# Patient Record
Sex: FEMALE | Race: WHITE | NOT HISPANIC OR LATINO | Employment: FULL TIME | URBAN - METROPOLITAN AREA
[De-identification: names, ages, dates, MRNs, and addresses within clinical notes are randomized per-mention and may not be internally consistent; named-entity substitution may affect disease eponyms.]

---

## 2017-01-12 DIAGNOSIS — J32.9 SINUSITIS, CHRONIC: Primary | ICD-10-CM

## 2017-01-16 RX ORDER — FLUTICASONE PROPIONATE 50 MCG
SPRAY, SUSPENSION (ML) NASAL
Qty: 16 G | Refills: 3 | Status: SHIPPED | OUTPATIENT
Start: 2017-01-16 | End: 2017-06-01

## 2017-03-27 DIAGNOSIS — F95.2 TOURETTE'S DISORDER: ICD-10-CM

## 2017-03-27 RX ORDER — RISPERIDONE 0.25 MG/1
TABLET ORAL
Qty: 90 TABLET | Refills: 1 | Status: SHIPPED | OUTPATIENT
Start: 2017-03-27 | End: 2017-05-22

## 2017-03-30 DIAGNOSIS — Z00.00 HEALTHCARE MAINTENANCE: ICD-10-CM

## 2017-04-03 RX ORDER — FERROUS SULFATE 325(65) MG
TABLET ORAL
Qty: 100 TABLET | Refills: 0 | Status: SHIPPED | OUTPATIENT
Start: 2017-04-03 | End: 2017-07-10

## 2017-04-20 DIAGNOSIS — E03.9 HYPOTHYROIDISM: ICD-10-CM

## 2017-04-20 NOTE — TELEPHONE ENCOUNTER
levothyroxine     Last Written Prescription Date: 12/22/16  Last Quantity: 30, # refills: 3  Last Office Visit with G, P or [facility] prescribing provider: 11/3/16   Next 5 appointments (look out 90 days)     May 16, 2017  9:00 AM CDT   (Arrive by 8:45 AM)   PHYSICAL with Clarissa Santos MD   [facility] ([facility] )    [address]  [facility] [address]   [phone]                   TSH   Date Value Ref Range Status   04/05/2016 1.09 0.40 - 4.00 mU/L Final

## 2017-04-21 RX ORDER — LEVOTHYROXINE SODIUM 150 UG/1
TABLET ORAL
Qty: 30 TABLET | Refills: 0 | Status: SHIPPED | OUTPATIENT
Start: 2017-04-21 | End: 2017-06-19

## 2017-04-27 DIAGNOSIS — F63.9 IMPULSE CONTROL DISEASE: ICD-10-CM

## 2017-04-27 RX ORDER — GUANFACINE 1 MG/1
TABLET ORAL
Qty: 45 TABLET | Refills: 5 | Status: SHIPPED | OUTPATIENT
Start: 2017-04-27 | End: 2017-10-26

## 2017-04-27 NOTE — TELEPHONE ENCOUNTER
tenex      Last Written Prescription Date: 11/3/16  Last Fill Quantity: 45,  # refills: 5   Last Office Visit with [facility], [facility] or [facility] prescribing provider: 11/3/16                                         Next 5 appointments (look out 90 days)     May 16, 2017  9:00 AM CDT   (Arrive by 8:45 AM)   PHYSICAL with Clarissa Santos MD   [facility] ([facility] )    [address]  [facility] [address]   [phone]

## 2017-05-04 DIAGNOSIS — Z00.00 HEALTH CARE MAINTENANCE: ICD-10-CM

## 2017-05-08 RX ORDER — ASCORBIC ACID 500 MG
TABLET ORAL
Qty: 100 TABLET | Refills: 2 | Status: SHIPPED | OUTPATIENT
Start: 2017-05-08 | End: 2018-06-04

## 2017-05-11 DIAGNOSIS — Z00.00 HEALTHCARE MAINTENANCE: ICD-10-CM

## 2017-05-12 RX ORDER — CHLORAL HYDRATE 500 MG
CAPSULE ORAL
Qty: 100 CAPSULE | Refills: 3 | Status: SHIPPED | OUTPATIENT
Start: 2017-05-12 | End: 2017-06-19

## 2017-05-16 ENCOUNTER — OFFICE VISIT (OUTPATIENT)
Dept: FAMILY MEDICINE | Facility: OTHER | Age: 47
End: 2017-05-16
Attending: FAMILY MEDICINE
Payer: MEDICARE

## 2017-05-16 VITALS
RESPIRATION RATE: 20 BRPM | WEIGHT: 120.4 LBS | BODY MASS INDEX: 27.86 KG/M2 | TEMPERATURE: 97.2 F | SYSTOLIC BLOOD PRESSURE: 120 MMHG | DIASTOLIC BLOOD PRESSURE: 72 MMHG | HEIGHT: 55 IN | HEART RATE: 66 BPM | OXYGEN SATURATION: 99 %

## 2017-05-16 DIAGNOSIS — E03.9 HYPOTHYROIDISM, UNSPECIFIED TYPE: ICD-10-CM

## 2017-05-16 DIAGNOSIS — R56.9 CONVULSIONS, UNSPECIFIED CONVULSION TYPE (H): ICD-10-CM

## 2017-05-16 DIAGNOSIS — E78.5 HYPERLIPIDEMIA, UNSPECIFIED HYPERLIPIDEMIA TYPE: ICD-10-CM

## 2017-05-16 DIAGNOSIS — L30.9 ECZEMA, UNSPECIFIED TYPE: ICD-10-CM

## 2017-05-16 DIAGNOSIS — E03.9 HYPOTHYROIDISM, UNSPECIFIED TYPE: Primary | ICD-10-CM

## 2017-05-16 DIAGNOSIS — Z00.00 ROUTINE GENERAL MEDICAL EXAMINATION AT A HEALTH CARE FACILITY: Primary | ICD-10-CM

## 2017-05-16 DIAGNOSIS — F63.9 IMPULSE CONTROL DISORDER: ICD-10-CM

## 2017-05-16 LAB
ALBUMIN SERPL-MCNC: 3.4 G/DL (ref 3.4–5)
ALP SERPL-CCNC: 88 U/L (ref 40–150)
ALT SERPL W P-5'-P-CCNC: 22 U/L (ref 0–50)
ANION GAP SERPL CALCULATED.3IONS-SCNC: 11 MMOL/L (ref 3–14)
AST SERPL W P-5'-P-CCNC: 21 U/L (ref 0–45)
BILIRUB SERPL-MCNC: 0.4 MG/DL (ref 0.2–1.3)
BUN SERPL-MCNC: 18 MG/DL (ref 7–30)
CALCIUM SERPL-MCNC: 9 MG/DL (ref 8.5–10.1)
CHLORIDE SERPL-SCNC: 106 MMOL/L (ref 94–109)
CHOLEST SERPL-MCNC: 129 MG/DL
CO2 SERPL-SCNC: 25 MMOL/L (ref 20–32)
CREAT SERPL-MCNC: 0.98 MG/DL (ref 0.52–1.04)
ERYTHROCYTE [DISTWIDTH] IN BLOOD BY AUTOMATED COUNT: 13.6 % (ref 10–15)
GFR SERPL CREATININE-BSD FRML MDRD: 62 ML/MIN/1.7M2
GLUCOSE SERPL-MCNC: 86 MG/DL (ref 70–99)
HCT VFR BLD AUTO: 42.5 % (ref 35–47)
HDLC SERPL-MCNC: 52 MG/DL
HGB BLD-MCNC: 14.1 G/DL (ref 11.7–15.7)
LDLC SERPL CALC-MCNC: 37 MG/DL
MCH RBC QN AUTO: 33.9 PG (ref 26.5–33)
MCHC RBC AUTO-ENTMCNC: 33.2 G/DL (ref 31.5–36.5)
MCV RBC AUTO: 102 FL (ref 78–100)
NONHDLC SERPL-MCNC: 77 MG/DL
PLATELET # BLD AUTO: 209 10E9/L (ref 150–450)
POTASSIUM SERPL-SCNC: 4.4 MMOL/L (ref 3.4–5.3)
PROT SERPL-MCNC: 7 G/DL (ref 6.8–8.8)
RBC # BLD AUTO: 4.16 10E12/L (ref 3.8–5.2)
SODIUM SERPL-SCNC: 142 MMOL/L (ref 133–144)
TRIGL SERPL-MCNC: 202 MG/DL
TSH SERPL DL<=0.05 MIU/L-ACNC: 4.6 MU/L (ref 0.4–4)
WBC # BLD AUTO: 6.2 10E9/L (ref 4–11)

## 2017-05-16 PROCEDURE — 80061 LIPID PANEL: CPT | Mod: ZL | Performed by: FAMILY MEDICINE

## 2017-05-16 PROCEDURE — 85027 COMPLETE CBC AUTOMATED: CPT | Mod: ZL | Performed by: FAMILY MEDICINE

## 2017-05-16 PROCEDURE — 80053 COMPREHEN METABOLIC PANEL: CPT | Mod: ZL | Performed by: FAMILY MEDICINE

## 2017-05-16 PROCEDURE — 36415 COLL VENOUS BLD VENIPUNCTURE: CPT | Mod: ZL | Performed by: FAMILY MEDICINE

## 2017-05-16 PROCEDURE — 84443 ASSAY THYROID STIM HORMONE: CPT | Mod: ZL | Performed by: FAMILY MEDICINE

## 2017-05-16 PROCEDURE — 99396 PREV VISIT EST AGE 40-64: CPT | Performed by: FAMILY MEDICINE

## 2017-05-16 RX ORDER — TRIAMCINOLONE ACETONIDE 1 MG/G
CREAM TOPICAL
Qty: 80 G | Refills: 1 | Status: SHIPPED | OUTPATIENT
Start: 2017-05-16 | End: 2018-01-03

## 2017-05-16 ASSESSMENT — PAIN SCALES - GENERAL: PAINLEVEL: NO PAIN (0)

## 2017-05-16 NOTE — PROGRESS NOTES
CC:  Yearly Well-Woman Exam    HPI:  Aaliyah is a 46 year old female who present today for yearly exam.  Her last pap smear was in 2016 under anesthesia.  Tdap was updated in 2013.    She is due for labs today.  This winter her eczema was worse, and they are wondering about possible treatment other than Eucerin.      Patient is also due for medication review for impulse control.  Staff notes no increase in behaviors since the last review.  Patient is tolerating current medication well without side effects.  Guardian has not requested any changes to current regimen.        Past Medical History:   Diagnosis Date     Allergic rhinitis, cause unspecified 11/15/2010     Down's syndrome 06/03/2002     DYSPHAGIA NOS 11/13/2007     Impulse control disorder, unspecified 11/15/2010     Other acne 11/15/2010     Other and unspecified hyperlipidemia 05/09/2002     Other convulsions 05/16/2007     Unspecified acquired hypothyroidism 03/21/2001     Unspecified disturbance of conduct 01/05/2006       Past Surgical History:   Procedure Laterality Date     CHOLECYSTECTOMY  01/2011     ENT SURGERY       EXAM UNDER ANESTHESIA PELVIC N/A 5/4/2016    Procedure: EXAM UNDER ANESTHESIA PELVIC;  Surgeon: Valentin Ferris MD;  Location: [facility]     GENITOURINARY SURGERY      INTERNAL URETHROTOMY     HERNIA REPAIR  march 4 2015    ventral herniorrhaphy with intraperitoneal sepramesh     ileostomy take-down  2011     illeostomy  04/25/2011     ventilation tubes, bilateral         Current Outpatient Prescriptions   Medication     triamcinolone (KENALOG) 0.1 % cream     fish oil-omega-3 fatty acids 1000 MG capsule     ascorbic acid (VITAMIN C) 500 MG tablet     guanFACINE (TENEX) 1 MG tablet     levothyroxine (SYNTHROID/LEVOTHROID) 150 MCG tablet     ferrous sulfate (IRON) 325 (65 FE) MG tablet     risperiDONE (RISPERDAL) 0.25 MG tablet     fluticasone (FLONASE) 50 MCG/ACT spray     INTROVALE 0.15-0.03 MG per tablet     omeprazole (PRILOSEC) 20 MG  capsule     simvastatin (ZOCOR) 10 MG tablet     chlorhexidine (PERIDEX) 0.12 % solution     OYSTER SHELL CALCIUM/VITAMIN D 250-125 MG-UNIT TABS per tablet     loratadine (CLARITIN) 10 MG tablet     ibuprofen (ADVIL,MOTRIN) 400 MG tablet     Skin Protectants, Misc. (EUCERIN) cream     No current facility-administered medications for this visit.        Allergies   Allergen Reactions     Amoxicillin      Cephalexin Monohydrate      Keflex         Family History   Problem Relation Age of Onset     CANCER Mother      cause of death     DIABETES No family hx of        Social History     Social History     Marital status: Single     Spouse name: N/A     Number of children: N/A     Years of education: N/A     Social History Main Topics     Smoking status: Never Smoker     Smokeless tobacco: Never Used      Comment: no passive exposure     Alcohol use No     Drug use: No     Sexual activity: No     Other Topics Concern     Blood Transfusions Yes     Caffeine Concern Yes     1 cup coffee daily     Exercise Yes     walking, dance     Parent/Sibling W/ Cabg, Mi Or Angioplasty Before 65f 55m? No     Social History Narrative         Review Of Systems  Skin: negative  Eyes: negative  Ears/Nose/Throat: negative  Respiratory: No shortness of breath, dyspnea on exertion, cough, or hemoptysis  Cardiovascular: negative for, palpitations, irregular heart beat and chest pain  Gastrointestinal: negative for, nausea, vomiting and abdominal pain  Genitourinary: negative for, dysuria, frequency, urgency and hesitancy  Musculoskeletal: negative for, joint pain, joint swelling and joint stiffness  Neurologic: negative for, numbness or tingling of hands, numbness or tingling of feet and speech problems  Psychiatric: negative for, anxiety, depression and agitation  Hematologic/Lymphatic/Immunologic: negative for  Endocrine: negative    Exam:  /72 (BP Location: Left arm, Cuff Size: Adult Regular)  Pulse 66  Temp 97.2  F (36.2  C)  "(Tympanic)  Resp 20  Ht 4' 7.25\" (1.403 m)  Wt 120 lb 6.4 oz (54.6 kg)  SpO2 99%  BMI 27.73 kg/m2  Constitutional: healthy, alert, no distress and cooperative  Head: Normocephalic. No masses, lesions, tenderness or abnormalities  Neck: Neck supple. No adenopathy.  ENT: ENT exam normal, no neck nodes or sinus tenderness  Cardiovascular: regular, normal S1 and S2, no murmurs appreciated  Respiratory: Lungs clear without wheezes or crackles  Breast: deferred  Gastrointestinal: Abdomen soft, non-tender. BS normal. No masses, organomegaly  /Pelvic Exam:  deferred  Musculoskeletal: extremities normal- no gross deformities noted, gait normal and normal muscle tone  Skin: no suspicious lesions or rashes, mild eczema in antecubital fossa bilaterally  Neurologic: Gait normal. Reflexes normal and symmetric. Sensation grossly WNL.  Psychiatric: mentation appears normal and affect normal/bright      Assessment/Plan  (Z00.00) Routine general medical examination at a health care facility  (primary encounter diagnosis)  Comment:   Plan: Screening labs updated.  Follow-up on annual basis.    (L30.9) Eczema, unspecified type  Comment:   Plan: triamcinolone (KENALOG) 0.1 % cream        Triamcinolone added for as needed.    (R56.9) Convulsions, unspecified convulsion type (H)  Comment:   Plan: Comprehensive metabolic panel        Labs updated.    (E78.5) Hyperlipidemia, unspecified hyperlipidemia type  Comment:   Plan: Comprehensive metabolic panel, CBC with         platelets, Lipid Profile        Labs updated.    (E03.9) Hypothyroidism, unspecified type  Comment:   Plan: TSH        Labs updated.    (F63.9) Impulse control disorder  Comment:   Plan: No medication changes at this time.        Clarissa Anderson MD        "

## 2017-05-16 NOTE — MR AVS SNAPSHOT
"              After Visit Summary   5/16/2017    Aaliyah Dean    MRN: [id number]           Patient Information     Date Of Birth          1970        Visit Information        Provider Department      5/16/2017 9:00 AM Clarissa Santos MD [facility] ([facility]        Today's Diagnoses     Routine general medical examination at a health care facility    -  1    Eczema, unspecified type        Convulsions, unspecified convulsion type (H)        Hyperlipidemia, unspecified hyperlipidemia type        Hypothyroidism, unspecified type        Impulse control disorder           Follow-ups after your visit        Follow-up notes from your care team     Return in about 6 months (around 11/16/2017).      Who to contact     If you have questions or need follow up information about today's clinic visit or your schedule please contact [facility] directly at [phone].  Normal or non-critical lab and imaging results will be communicated to you by MyChart, letter or phone within 4 business days after the clinic has received the results. If you do not hear from us within 7 days, please contact the clinic through MyChart or phone. If you have a critical or abnormal lab result, we will notify you by phone as soon as possible.  Submit refill requests through [employer] or call your pharmacy and they will forward the refill request to us. Please allow 3 business days for your refill to be completed.          Additional Information About Your Visit        MyChart Information     [employer] lets you send messages to your doctor, view your test results, renew your prescriptions, schedule appointments and more. To sign up, go to [email or web address] . Click on \"Log in\" on the left side of the screen, which will take you to the Welcome page. Then click on \"Sign up Now\" on the right side of the page.     You will be asked to enter the access code listed below, as well as some personal information. Please follow " "the directions to create your username and password.     Your access code is: [email or web address]  Expires: 2017  9:57 AM     Your access code will  in 90 days. If you need help or a new code, please call your [facility] or [phone].        Care EveryWhere ID     This is your Care EveryWhere ID. This could be used by other organizations to access your [place] [facility] medical records  [id number]        Your Vitals Were     Pulse Temperature Respirations Height Pulse Oximetry BMI (Body Mass Index)    66 97.2  F (36.2  C) (Tympanic) 20 4' 7.25\" (1.403 m) 99% 27.73 kg/m2       Blood Pressure from Last 3 Encounters:   17 120/72   16 118/76   10/07/16 96/58    Weight from Last 3 Encounters:   17 120 lb 6.4 oz (54.6 kg)   16 120 lb (54.4 kg)   10/07/16 117 lb (53.1 kg)              We Performed the Following     CBC with platelets     Comprehensive metabolic panel     Lipid Profile     TSH          Today's Medication Changes          These changes are accurate as of: 17  9:57 AM.  If you have any questions, ask your nurse or doctor.               Start taking these medicines.        Dose/Directions    triamcinolone 0.1 % cream   Commonly known as:  KENALOG   Used for:  Eczema, unspecified type   Started by:  Clarisas Santos MD        Apply sparingly to affected area twice daily as needed   Quantity:  80 g   Refills:  1            Where to get your medicines      These medications were sent to [facility] - [place], [place] - [address]  [address] [place] [address]     Phone:  [phone]     triamcinolone 0.1 % cream                Primary Care Provider Office Phone # Fax #    Clarissa Santos [phone]       [address] [address]        Thank you!     Thank you for choosing [facility]  for your care. Our goal is always to provide you with excellent care. Hearing back from our patients is one way " we can continue to improve our services. Please take a few minutes to complete the written survey that you may receive in the mail after your visit with us. Thank you!             Your Updated Medication List - Protect others around you: Learn how to safely use, store and throw away your medicines at www.disposemymeds.org.          This list is accurate as of: 5/16/17  9:57 AM.  Always use your most recent med list.                   Brand Name Dispense Instructions for use    ascorbic acid 500 MG tablet    VITAMIN C    100 tablet    TAKE 1 TABLET DAILY BY MOUTH       calcium-vitamin D 250-125 MG-UNIT Tabs per tablet    OSCAL    100 tablet    TAKE 1 TABLET TWICE A DAY BY MOUTH TAKE WITH MEALS       chlorhexidine 0.12 % solution    PERIDEX    437 mL    PLACE 15 MILLILITER BY MUCOUS MEMBRANE ROUTE EVERY DAY IN THE MOUTH(AFTER MEALS), SWISH IN MOUTH FOR 30 SECONDS THEN SPIT OUT       eucerin cream          ferrous sulfate 325 (65 FE) MG tablet    IRON    100 tablet    TAKE 1 TABLET TWICE A DAY BY MOUTH       fish oil-omega-3 fatty acids 1000 MG capsule     100 capsule    TAKE 1 CAPSULE 3 TIMES A DAY BY MOUTH       fluticasone 50 MCG/ACT spray    FLONASE    16 g    INSTIL 2 SPRAYS INTO BOTH NOSTRIL DAILY SHAKE GENTLY       guanFACINE 1 MG tablet    TENEX    45 tablet    TAKE 1/2 TABLET BY MOUTH THREE TIMES DAILY       ibuprofen 400 MG tablet    ADVIL/MOTRIN    120 tablet    Take 1 tablet by mouth every 4-6 hours as needed       INTROVALE 0.15-0.03 MG per tablet   Generic drug:  levonorgestrel-ethinyl estradiol     91 tablet    TAKE 1 TABLET BY MOUTH DAILY.       levothyroxine 150 MCG tablet    SYNTHROID/LEVOTHROID    30 tablet    TAKE 1 TABLET DAILY BY MOUTH       loratadine 10 MG tablet    CLARITIN    30 tablet    Take 1 tablet (10 mg) by mouth daily as needed for allergies       omeprazole 20 MG CR capsule    priLOSEC    30 capsule    TAKE 1 CAPSULE DAILY BY MOUTH BEFORE A MEAL       risperiDONE 0.25 MG tablet     risperDAL    90 tablet    TAKE 1 TABLET 3 TIMES DAILY BY MOUTH       simvastatin 10 MG tablet    ZOCOR    30 tablet    TAKE 1 TABLET DAILY BY MOUTH EVERY EVENING FOR ZOCOR       triamcinolone 0.1 % cream    KENALOG    80 g    Apply sparingly to affected area twice daily as needed

## 2017-05-16 NOTE — NURSING NOTE
"Chief Complaint   Patient presents with     Physical       Initial /72 (BP Location: Left arm, Cuff Size: Adult Regular)  Pulse 66  Temp 97.2  F (36.2  C) (Tympanic)  Resp 20  Ht 4' 7.25\" (1.403 m)  Wt 120 lb 6.4 oz (54.6 kg)  SpO2 99%  BMI 27.73 kg/m2 Estimated body mass index is 27.73 kg/(m^2) as calculated from the following:    Height as of this encounter: 4' 7.25\" (1.403 m).    Weight as of this encounter: 120 lb 6.4 oz (54.6 kg).  Medication Reconciliation: complete   Katelynn Randhawa    "

## 2017-05-22 DIAGNOSIS — F95.2 TOURETTE'S DISORDER: ICD-10-CM

## 2017-05-24 NOTE — TELEPHONE ENCOUNTER
risperiDONE (RISPERDAL) 0.25 MG tablet   Last Written Prescription Date: 03/27/2017  Last Fill Quantity: 90, # refills: 0  Last Office Visit with [facility], [facility] or [facility] prescribing provider: 05/16/2017       BP Readings from Last 3 Encounters:   05/16/17 120/72   11/03/16 118/76   10/07/16 96/58     Pulse Readings from Last 2 Encounters:   05/16/17 66   11/03/16 59     Lab Results   Component Value Date    GLC 86 05/16/2017     Lab Results   Component Value Date    WBC 6.2 05/16/2017     Lab Results   Component Value Date    RBC 4.16 05/16/2017     Lab Results   Component Value Date    HGB 14.1 05/16/2017     Lab Results   Component Value Date    HCT 42.5 05/16/2017     No components found for: MCT  Lab Results   Component Value Date     05/16/2017     Lab Results   Component Value Date    MCH 33.9 05/16/2017     Lab Results   Component Value Date    MCHC 33.2 05/16/2017     Lab Results   Component Value Date    RDW 13.6 05/16/2017     Lab Results   Component Value Date     05/16/2017     Lab Results   Component Value Date    CHOL 129 05/16/2017     Lab Results   Component Value Date    HDL 52 05/16/2017     Lab Results   Component Value Date    LDL 37 05/16/2017     Lab Results   Component Value Date    TRIG 202 05/16/2017     Lab Results   Component Value Date    CHOLHDLRATIO 2.4 10/08/2015

## 2017-05-25 RX ORDER — RISPERIDONE 0.25 MG/1
TABLET ORAL
Qty: 90 TABLET | Refills: 1 | Status: SHIPPED | OUTPATIENT
Start: 2017-05-25 | End: 2017-07-24

## 2017-06-01 DIAGNOSIS — J32.9 SINUSITIS, CHRONIC: ICD-10-CM

## 2017-06-01 RX ORDER — FLUTICASONE PROPIONATE 50 MCG
SPRAY, SUSPENSION (ML) NASAL
Qty: 16 G | Refills: 3 | Status: SHIPPED | OUTPATIENT
Start: 2017-06-01 | End: 2017-09-05

## 2017-06-01 NOTE — TELEPHONE ENCOUNTER
flonase      Last Written Prescription Date: 6/3/13  Last Fill Quantity: 16,  # refills: 3   Last Office Visit with G, [facility] or [facility] prescribing provider: 5/16/17

## 2017-06-12 DIAGNOSIS — Z00.00 HEALTH CARE MAINTENANCE: ICD-10-CM

## 2017-06-13 NOTE — TELEPHONE ENCOUNTER
Oyster shell with vitamin d      Last Written Prescription Date: 06122017  Last Fill Quantity: 100,  # refills: 5   Last Office Visit with G, [facility] or [facility] prescribing provider: [phone]

## 2017-06-15 DIAGNOSIS — E78.5 HYPERLIPIDEMIA LDL GOAL <100: ICD-10-CM

## 2017-06-19 DIAGNOSIS — Z00.00 HEALTHCARE MAINTENANCE: ICD-10-CM

## 2017-06-19 DIAGNOSIS — E03.9 HYPOTHYROIDISM: ICD-10-CM

## 2017-06-19 RX ORDER — SIMVASTATIN 10 MG
TABLET ORAL
Qty: 30 TABLET | Refills: 7 | Status: SHIPPED | OUTPATIENT
Start: 2017-06-19 | End: 2018-02-08

## 2017-06-21 RX ORDER — LEVOTHYROXINE SODIUM 150 UG/1
TABLET ORAL
Qty: 30 TABLET | Refills: 1 | Status: SHIPPED | OUTPATIENT
Start: 2017-06-21 | End: 2017-07-20

## 2017-06-21 RX ORDER — CHLORAL HYDRATE 500 MG
CAPSULE ORAL
Qty: 100 CAPSULE | Refills: 10 | Status: SHIPPED | OUTPATIENT
Start: 2017-06-21 | End: 2018-02-05

## 2017-07-10 DIAGNOSIS — Z00.00 HEALTHCARE MAINTENANCE: ICD-10-CM

## 2017-07-11 RX ORDER — FERROUS SULFATE 325(65) MG
TABLET ORAL
Qty: 100 TABLET | Refills: 1 | Status: SHIPPED | OUTPATIENT
Start: 2017-07-11 | End: 2017-08-28

## 2017-07-11 NOTE — TELEPHONE ENCOUNTER
Iron         Last Written Prescription Date: 4/3/17  Last Fill Quantity: 100,    # refills: 0  Last Office Visit with [facility] or [facility] prescribing provider:  5/16/17      Lab Results   Component Value Date    WBC 6.2 05/16/2017     Lab Results   Component Value Date    RBC 4.16 05/16/2017     Lab Results   Component Value Date    HGB 14.1 05/16/2017     Lab Results   Component Value Date    HCT 42.5 05/16/2017     No components found for: MCT  Lab Results   Component Value Date     05/16/2017     Lab Results   Component Value Date    MCH 33.9 05/16/2017     Lab Results   Component Value Date    MCHC 33.2 05/16/2017     Lab Results   Component Value Date    RDW 13.6 05/16/2017     Lab Results   Component Value Date     05/16/2017     Lab Results   Component Value Date    AST 21 05/16/2017     Lab Results   Component Value Date    ALT 22 05/16/2017     Creatinine   Date Value Ref Range Status   05/16/2017 0.98 0.52 - 1.04 mg/dL Final

## 2017-07-20 DIAGNOSIS — E03.9 HYPOTHYROIDISM: ICD-10-CM

## 2017-07-20 NOTE — TELEPHONE ENCOUNTER
Levothyroxine      Last Written Prescription Date: 6/21/2017  Last Quantity: 30, # refills: 1  Last Office Visit with G, P or [facility] prescribing provider: 5/16/2017        TSH   Date Value Ref Range Status   05/16/2017 4.60 (H) 0.40 - 4.00 mU/L Final

## 2017-07-24 DIAGNOSIS — F95.2 TOURETTE'S DISORDER: ICD-10-CM

## 2017-07-24 RX ORDER — LEVOTHYROXINE SODIUM 150 UG/1
TABLET ORAL
Qty: 30 TABLET | Refills: 8 | Status: SHIPPED | OUTPATIENT
Start: 2017-07-24 | End: 2018-02-12

## 2017-07-26 RX ORDER — RISPERIDONE 0.25 MG/1
TABLET ORAL
Qty: 90 TABLET | Refills: 1 | Status: SHIPPED | OUTPATIENT
Start: 2017-07-26 | End: 2017-08-21

## 2017-08-21 DIAGNOSIS — F95.2 TOURETTE'S DISORDER: ICD-10-CM

## 2017-08-21 NOTE — TELEPHONE ENCOUNTER
Risperdal     Last Written Prescription Date: 08/21/2017  Last Fill Quantity: 90, # refills: 1  Last Office Visit with [facility], [facility] or  [facility] prescribing provider: 05/16/2017       BP Readings from Last 3 Encounters:   05/16/17 120/72   11/03/16 118/76   10/07/16 96/58     Pulse Readings from Last 2 Encounters:   05/16/17 66   11/03/16 59     Lab Results   Component Value Date    GLC 86 05/16/2017     Lab Results   Component Value Date    WBC 6.2 05/16/2017     Lab Results   Component Value Date    RBC 4.16 05/16/2017     Lab Results   Component Value Date    HGB 14.1 05/16/2017     Lab Results   Component Value Date    HCT 42.5 05/16/2017     No components found for: MCT  Lab Results   Component Value Date     05/16/2017     Lab Results   Component Value Date    MCH 33.9 05/16/2017     Lab Results   Component Value Date    MCHC 33.2 05/16/2017     Lab Results   Component Value Date    RDW 13.6 05/16/2017     Lab Results   Component Value Date     05/16/2017     Lab Results   Component Value Date    CHOL 129 05/16/2017     Lab Results   Component Value Date    HDL 52 05/16/2017     Lab Results   Component Value Date    LDL 37 05/16/2017     Lab Results   Component Value Date    TRIG 202 05/16/2017     Lab Results   Component Value Date    CHOLHDLRATIO 2.4 10/08/2015

## 2017-08-22 DIAGNOSIS — E03.9 HYPOTHYROIDISM, UNSPECIFIED TYPE: ICD-10-CM

## 2017-08-22 LAB — TSH SERPL DL<=0.005 MIU/L-ACNC: 3.46 MU/L (ref 0.4–4)

## 2017-08-22 PROCEDURE — 84443 ASSAY THYROID STIM HORMONE: CPT | Mod: ZL | Performed by: FAMILY MEDICINE

## 2017-08-22 PROCEDURE — 36415 COLL VENOUS BLD VENIPUNCTURE: CPT | Mod: ZL | Performed by: FAMILY MEDICINE

## 2017-08-22 RX ORDER — RISPERIDONE 0.25 MG/1
TABLET ORAL
Qty: 90 TABLET | Refills: 2 | Status: SHIPPED | OUTPATIENT
Start: 2017-08-22 | End: 2017-11-20

## 2017-08-28 DIAGNOSIS — Z00.00 HEALTHCARE MAINTENANCE: ICD-10-CM

## 2017-08-28 RX ORDER — FERROUS SULFATE 325(65) MG
TABLET ORAL
Qty: 100 TABLET | Refills: 5 | Status: SHIPPED | OUTPATIENT
Start: 2017-08-28 | End: 2018-03-19

## 2017-09-21 DIAGNOSIS — Q90.9 DOWN'S SYNDROME: ICD-10-CM

## 2017-09-21 RX ORDER — LEVONORGESTREL AND ETHINYL ESTRADIOL 0.15-0.03
KIT ORAL
Qty: 91 TABLET | Refills: 1 | Status: SHIPPED | OUTPATIENT
Start: 2017-09-21 | End: 2018-06-25

## 2017-09-25 DIAGNOSIS — Z00.00 HEALTH CARE MAINTENANCE: ICD-10-CM

## 2017-09-26 RX ORDER — CHLORHEXIDINE GLUCONATE ORAL RINSE 1.2 MG/ML
SOLUTION DENTAL
Qty: 437 ML | Status: SHIPPED | OUTPATIENT
Start: 2017-09-26 | End: 2018-05-14

## 2017-10-19 DIAGNOSIS — R13.10 DYSPHAGIA, UNSPECIFIED TYPE: ICD-10-CM

## 2017-10-26 DIAGNOSIS — F63.9 IMPULSE CONTROL DISEASE: ICD-10-CM

## 2017-10-27 RX ORDER — GUANFACINE 1 MG/1
TABLET ORAL
Qty: 45 TABLET | Refills: 5 | Status: SHIPPED | OUTPATIENT
Start: 2017-10-27 | End: 2018-02-21

## 2017-11-20 DIAGNOSIS — F95.2 TOURETTE'S DISORDER: ICD-10-CM

## 2017-11-20 RX ORDER — RISPERIDONE 0.25 MG/1
TABLET ORAL
Qty: 90 TABLET | Refills: 0 | Status: SHIPPED | OUTPATIENT
Start: 2017-11-20 | End: 2018-01-18

## 2017-12-08 ENCOUNTER — OFFICE VISIT (OUTPATIENT)
Dept: FAMILY MEDICINE | Facility: OTHER | Age: 47
End: 2017-12-08
Attending: FAMILY MEDICINE
Payer: MEDICARE

## 2017-12-08 VITALS
SYSTOLIC BLOOD PRESSURE: 100 MMHG | RESPIRATION RATE: 16 BRPM | TEMPERATURE: 98.4 F | WEIGHT: 123 LBS | BODY MASS INDEX: 28.46 KG/M2 | DIASTOLIC BLOOD PRESSURE: 60 MMHG | HEART RATE: 65 BPM | HEIGHT: 55 IN | OXYGEN SATURATION: 97 %

## 2017-12-08 DIAGNOSIS — F91.9 DISTURBANCE OF CONDUCT: ICD-10-CM

## 2017-12-08 DIAGNOSIS — F63.9 IMPULSE CONTROL DISORDER: Primary | ICD-10-CM

## 2017-12-08 PROCEDURE — 99213 OFFICE O/P EST LOW 20 MIN: CPT

## 2017-12-08 PROCEDURE — 99213 OFFICE O/P EST LOW 20 MIN: CPT | Performed by: FAMILY MEDICINE

## 2017-12-08 NOTE — MR AVS SNAPSHOT
"              After Visit Summary   2017    Aaliyah Dean    MRN: [id number]           Patient Information     Date Of Birth          1970        Visit Information        Provider Department      2017 2:00 PM Clarissa Santos MD [facility]        Today's Diagnoses     Impulse control disorder    -  1    Disturbance of conduct           Follow-ups after your visit        Follow-up notes from your care team     Return in about 6 months (around 2018).      Who to contact     If you have questions or need follow up information about today's clinic visit or your schedule please contact [facility] directly at [phone].  Normal or non-critical lab and imaging results will be communicated to you by MyChart, letter or phone within 4 business days after the clinic has received the results. If you do not hear from us within 7 days, please contact the clinic through MyChart or phone. If you have a critical or abnormal lab result, we will notify you by phone as soon as possible.  Submit refill requests through [employer] or call your pharmacy and they will forward the refill request to us. Please allow 3 business days for your refill to be completed.          Additional Information About Your Visit        MyChart Information     [employer] lets you send messages to your doctor, view your test results, renew your prescriptions, schedule appointments and more. To sign up, go to [email or web address] . Click on \"Log in\" on the left side of the screen, which will take you to the Welcome page. Then click on \"Sign up Now\" on the right side of the page.     You will be asked to enter the access code listed below, as well as some personal information. Please follow the directions to create your username and password.     Your access code is: [email or web address]  Expires: 3/8/2018  5:10 PM     Your access code will  in 90 days. If you need help or a new code, please call your " "[facility] or [phone].        Care EveryWhere ID     This is your Care EveryWhere ID. This could be used by other organizations to access your [place] medical records  [id number]        Your Vitals Were     Pulse Temperature Respirations Height Pulse Oximetry BMI (Body Mass Index)    65 98.4  F (36.9  C) (Tympanic) 16 4' 7.25\" (1.403 m) 97% 28.33 kg/m2       Blood Pressure from Last 3 Encounters:   12/08/17 100/60   05/16/17 120/72   11/03/16 118/76    Weight from Last 3 Encounters:   12/08/17 123 lb (55.8 kg)   05/16/17 120 lb 6.4 oz (54.6 kg)   11/03/16 120 lb (54.4 kg)              Today, you had the following     No orders found for display       Primary Care Provider Office Phone # Fax #    Clarissa Santos [phone] [address] [facility] [address]        Equal Access to Services     [facility]: Hadii lina julian hadasho Soomaali, waaxda luqadaha, qaybta kaalmada adeegyaalexy, helen trevino . So [facility] [phone].    ATENCIÓN: Si habla español, tiene a paulino disposición servicios gratuitos de asistencia lingüística. Llame al [phone].    We comply with applicable federal civil rights laws and [place] laws. We do not discriminate on the basis of race, color, national origin, age, disability, sex, sexual orientation, or gender identity.            Thank you!     Thank you for choosing [facility]  for your care. Our goal is always to provide you with excellent care. Hearing back from our patients is one way we can continue to improve our services. Please take a few minutes to complete the written survey that you may receive in the mail after your visit with us. Thank you!             Your Updated Medication List - Protect others around you: Learn how to safely use, store and throw away your medicines at www.disposemymeds.org.          This list is accurate as of: 12/8/17  5:10 PM.  Always use your most recent med list. "                   Brand Name Dispense Instructions for use Diagnosis    ascorbic acid 500 MG tablet    VITAMIN C    100 tablet    TAKE 1 TABLET DAILY BY MOUTH    Health care maintenance       Calcium-Vitamin D3 250-125 MG-UNIT Tabs     100 tablet    TAKE 1 TABLET TWICE A DAY BY MOUTH TAKE WITH MEALS    Health care maintenance       chlorhexidine 0.12 % solution    PERIDEX    437 mL    PLACE 15 MILLILITER BY MUCOUS MEMBRANE ROUTE EVERY DAY IN THE MOUTH(AFTER MEALS), SWISH IN MOUTH FOR 30 SECONDS THEN SPIT OUT    Health care maintenance       eucerin cream           ferrous sulfate 325 (65 FE) MG tablet    IRON    100 tablet    TAKE 1 TABLET TWICE A DAY BY MOUTH    Healthcare maintenance       fish oil-omega-3 fatty acids 1000 MG capsule     100 capsule    TAKE 1 CAPSULE 3 TIMES A DAY BY MOUTH    Healthcare maintenance       fluticasone 50 MCG/ACT spray    FLONASE    16 g    INSTIL 2 SPRAYS INTO BOTH NOSTRIL DAILY SHAKE GENTLY    Sinusitis, chronic       guanFACINE 1 MG tablet    TENEX    45 tablet    TAKE 1/2 TABLET BY MOUTH THREE TIMES DAILY    Impulse control disease       ibuprofen 400 MG tablet    ADVIL/MOTRIN    120 tablet    Take 1 tablet by mouth every 4-6 hours as needed    Health care maintenance       INTROVALE 0.15-0.03 MG per tablet   Generic drug:  levonorgestrel-ethinyl estradiol     91 tablet    TAKE 1 TABLET BY MOUTH DAILY.    Down's syndrome       levothyroxine 150 MCG tablet    SYNTHROID/LEVOTHROID    30 tablet    TAKE 1 TABLET DAILY BY MOUTH    Hypothyroidism       loratadine 10 MG tablet    CLARITIN    30 tablet    Take 1 tablet (10 mg) by mouth daily as needed for allergies    Dysfunction of Eustachian tube, bilateral       omeprazole 20 MG CR capsule    priLOSEC    30 capsule    TAKE 1 CAPSULE DAILY BY MOUTH BEFORE A MEAL    Dysphagia, unspecified type       risperiDONE 0.25 MG tablet    risperDAL    90 tablet    TAKE 1 TABLET 3 TIMES DAILY BY MOUTH    Tourette's disorder       simvastatin 10 MG  tablet    ZOCOR    30 tablet    TAKE 1 TABLET DAILY BY MOUTH EVERY EVENING FOR ZOCOR    Hyperlipidemia LDL goal <100       triamcinolone 0.1 % cream    KENALOG    80 g    Apply sparingly to affected area twice daily as needed    Eczema, unspecified type

## 2017-12-08 NOTE — PROGRESS NOTES
SUBJECTIVE:   Aaliyah Dean is a 47 year old female who presents to clinic today for the following health issues:    Medication Followup of Impulse Control Disorder and Conduct Disorder    Taking Medication as prescribed: yes    Side Effects:  None    Medication Helping Symptoms:  Yes    Staff and guardian think medication is doing well, no medication changes are requested.         Problem list and histories reviewed & adjusted, as indicated.  Additional history: as documented    Patient Active Problem List   Diagnosis     Down's syndrome     Hypothyroidism     Hyperlipidemia     Disturbance of conduct     Convulsions (H)     Dysphagia     Allergic rhinitis     Other acne     Impulse control disorder     Past Surgical History:   Procedure Laterality Date     CHOLECYSTECTOMY  01/2011     ENT SURGERY       EXAM UNDER ANESTHESIA PELVIC N/A 5/4/2016    Procedure: EXAM UNDER ANESTHESIA PELVIC;  Surgeon: Valentin Ferris MD;  Location: [facility]     GENITOURINARY SURGERY      INTERNAL URETHROTOMY     HERNIA REPAIR  march 4 2015    ventral herniorrhaphy with intraperitoneal sepramesh     ileostomy take-down  2011     illeostomy  04/25/2011     ventilation tubes, bilateral         Social History   Substance Use Topics     Smoking status: Never Smoker     Smokeless tobacco: Never Used      Comment: no passive exposure     Alcohol use No     Family History   Problem Relation Age of Onset     CANCER Mother      cause of death     DIABETES No family hx of          Current Outpatient Prescriptions   Medication Sig Dispense Refill     risperiDONE (RISPERDAL) 0.25 MG tablet TAKE 1 TABLET 3 TIMES DAILY BY MOUTH 90 tablet 0     guanFACINE (TENEX) 1 MG tablet TAKE 1/2 TABLET BY MOUTH THREE TIMES DAILY 45 tablet 5     omeprazole (PRILOSEC) 20 MG CR capsule TAKE 1 CAPSULE DAILY BY MOUTH BEFORE A MEAL 30 capsule 5     chlorhexidine (PERIDEX) 0.12 % solution PLACE 15 MILLILITER BY MUCOUS MEMBRANE ROUTE EVERY DAY IN THE MOUTH(AFTER  "MEALS), SWISH IN MOUTH FOR 30 SECONDS THEN SPIT  mL PRN     INTROVALE 0.15-0.03 MG per tablet TAKE 1 TABLET BY MOUTH DAILY. 91 tablet 1     fluticasone (FLONASE) 50 MCG/ACT spray INSTIL 2 SPRAYS INTO BOTH NOSTRIL DAILY SHAKE GENTLY 16 g 3     levothyroxine (SYNTHROID/LEVOTHROID) 150 MCG tablet TAKE 1 TABLET DAILY BY MOUTH 30 tablet 8     simvastatin (ZOCOR) 10 MG tablet TAKE 1 TABLET DAILY BY MOUTH EVERY EVENING FOR ZOCOR 30 tablet 7     Calcium Carb-Cholecalciferol (CALCIUM-VITAMIN D3) 250-125 MG-UNIT TABS TAKE 1 TABLET TWICE A DAY BY MOUTH TAKE WITH MEALS 100 tablet 5     triamcinolone (KENALOG) 0.1 % cream Apply sparingly to affected area twice daily as needed 80 g 1     ascorbic acid (VITAMIN C) 500 MG tablet TAKE 1 TABLET DAILY BY MOUTH 100 tablet 2     loratadine (CLARITIN) 10 MG tablet Take 1 tablet (10 mg) by mouth daily as needed for allergies 30 tablet 5     ibuprofen (ADVIL,MOTRIN) 400 MG tablet Take 1 tablet by mouth every 4-6 hours as needed 120 tablet 2     Skin Protectants, Misc. (EUCERIN) cream        ferrous sulfate (IRON) 325 (65 FE) MG tablet TAKE 1 TABLET TWICE A DAY BY MOUTH 100 tablet 5     fish oil-omega-3 fatty acids 1000 MG capsule TAKE 1 CAPSULE 3 TIMES A DAY BY MOUTH 100 capsule 10     Allergies   Allergen Reactions     Amoxicillin      Cephalexin Monohydrate      Keflex           Reviewed and updated as needed this visit by clinical staffTobacco  Allergies  Meds  Problems  Med Hx  Surg Hx  Fam Hx  Soc Hx        Reviewed and updated as needed this visit by Provider         ROS:  Constitutional, HEENT, cardiovascular, pulmonary, gi and gu systems are negative, except as otherwise noted.      OBJECTIVE:   /60 (BP Location: Right arm, Patient Position: Sitting, Cuff Size: Adult Regular)  Pulse 65  Temp 98.4  F (36.9  C) (Tympanic)  Resp 16  Ht 4' 7.25\" (1.403 m)  Wt 123 lb (55.8 kg)  SpO2 97%  BMI 28.33 kg/m2  Body mass index is 28.33 kg/(m^2).  GENERAL: healthy, " alert and no distress  EYES: Eyes grossly normal to inspection, PERRL and conjunctivae and sclerae normal  HENT: normal cephalic/atraumatic, ear canals and TM's normal, nose and mouth without ulcers or lesions, oropharynx clear and oral mucous membranes moist  NECK: no adenopathy  RESP: lungs clear to auscultation - no rales, rhonchi or wheezes  CV: regular rate and rhythm, normal S1 S2, no S3 or S4, no murmur, click or rub, no peripheral edema and peripheral pulses strong  PSYCH: mentation appears normal, affect normal/bright    Diagnostic Test Results:  none     ASSESSMENT/PLAN:     1. Impulse control disorder  No medication changes recommended, follow-up in 6 months, sooner as needed.    2. Disturbance of conduct  As above.        Clarissa Santos MD  [facility]

## 2017-12-08 NOTE — NURSING NOTE
"Chief Complaint   Patient presents with     Recheck Medication     [facility] Resident  Reviewing Risperdal dose, doing well on current dose       Initial /60 (BP Location: Right arm, Patient Position: Sitting, Cuff Size: Adult Regular)  Pulse 65  Temp 98.4  F (36.9  C) (Tympanic)  Resp 16  Ht 4' 7.25\" (1.403 m)  Wt 123 lb (55.8 kg)  SpO2 97%  BMI 28.33 kg/m2 Estimated body mass index is 28.33 kg/(m^2) as calculated from the following:    Height as of this encounter: 4' 7.25\" (1.403 m).    Weight as of this encounter: 123 lb (55.8 kg).  Medication Reconciliation: complete     Karli Emerson      "

## 2018-01-03 DIAGNOSIS — L30.9 ECZEMA, UNSPECIFIED TYPE: ICD-10-CM

## 2018-01-04 DIAGNOSIS — J32.9 CHRONIC SINUSITIS, UNSPECIFIED LOCATION: ICD-10-CM

## 2018-01-04 RX ORDER — FLUTICASONE PROPIONATE 50 MCG
SPRAY, SUSPENSION (ML) NASAL
Qty: 16 G | Refills: 3 | Status: SHIPPED | OUTPATIENT
Start: 2018-01-04 | End: 2018-05-29

## 2018-01-05 RX ORDER — TRIAMCINOLONE ACETONIDE 1 MG/G
CREAM TOPICAL
Qty: 80 G | Refills: 1 | Status: SHIPPED | OUTPATIENT
Start: 2018-01-05

## 2018-01-18 DIAGNOSIS — F95.2 TOURETTE'S DISORDER: ICD-10-CM

## 2018-01-18 RX ORDER — RISPERIDONE 0.25 MG/1
TABLET ORAL
Qty: 90 TABLET | Refills: 3 | Status: SHIPPED | OUTPATIENT
Start: 2018-01-18 | End: 2018-06-18

## 2018-01-18 NOTE — TELEPHONE ENCOUNTER
risperidone      Last Written Prescription Date:  11/2/17  Last Fill Quantity: 90,   # refills: 0  Last Office Visit: 12/8/17  Future Office visit: none

## 2018-02-05 DIAGNOSIS — Z00.00 HEALTHCARE MAINTENANCE: ICD-10-CM

## 2018-02-05 RX ORDER — CHLORAL HYDRATE 500 MG
CAPSULE ORAL
Qty: 100 CAPSULE | Refills: 2 | Status: SHIPPED | OUTPATIENT
Start: 2018-02-05 | End: 2018-02-05

## 2018-02-05 RX ORDER — OMEGA-3 FATTY ACIDS/FISH OIL 300-1000MG
CAPSULE ORAL
Qty: 100 CAPSULE | Refills: 2 | Status: SHIPPED | OUTPATIENT
Start: 2018-02-05 | End: 2018-05-14

## 2018-02-05 NOTE — TELEPHONE ENCOUNTER
Refill request for Fish oil Capsule, Omega 3 fatty acids 1000mg  Last refill 1-3-18 , qty # pharmacy requested 100  Last office visit 12-8-17

## 2018-02-08 DIAGNOSIS — E78.5 HYPERLIPIDEMIA LDL GOAL <100: ICD-10-CM

## 2018-02-08 RX ORDER — SIMVASTATIN 10 MG
TABLET ORAL
Qty: 30 TABLET | Refills: 2 | Status: SHIPPED | OUTPATIENT
Start: 2018-02-08 | End: 2018-05-02

## 2018-02-15 DIAGNOSIS — R13.10 DYSPHAGIA, UNSPECIFIED TYPE: ICD-10-CM

## 2018-02-16 DIAGNOSIS — Z00.00 HEALTH CARE MAINTENANCE: ICD-10-CM

## 2018-02-16 NOTE — TELEPHONE ENCOUNTER
Calcium Carb-Cholecalciferol (CALCIUM-VITAMIN D3) 250-125 MG-UNIT TABS   Last Written Prescription Date:  6/13/17  Last Fill Quantity: 100,   # refills: 5  Last Office Visit: 12/8/17  Future Office visit:

## 2018-02-21 DIAGNOSIS — F63.9 IMPULSE CONTROL DISEASE: ICD-10-CM

## 2018-02-21 NOTE — TELEPHONE ENCOUNTER
Refill request for Guanfacine HCI (Tenex) 1 mg tab  Last refill 1-22-18 , qty #45  Last office visit 12-8-17

## 2018-02-22 RX ORDER — GUANFACINE 1 MG/1
TABLET ORAL
Qty: 45 TABLET | Refills: 5 | Status: SHIPPED | OUTPATIENT
Start: 2018-02-22 | End: 2018-07-30

## 2018-03-19 DIAGNOSIS — Z00.00 HEALTHCARE MAINTENANCE: ICD-10-CM

## 2018-03-21 RX ORDER — FERROUS SULFATE 325(65) MG
TABLET ORAL
Qty: 100 TABLET | Refills: 2 | Status: SHIPPED | OUTPATIENT
Start: 2018-03-21 | End: 2018-11-20

## 2018-04-30 DIAGNOSIS — J32.9 CHRONIC SINUSITIS, UNSPECIFIED LOCATION: ICD-10-CM

## 2018-04-30 RX ORDER — FLUTICASONE PROPIONATE 50 MCG
SPRAY, SUSPENSION (ML) NASAL
Qty: 16 G | Refills: 2 | OUTPATIENT
Start: 2018-04-30

## 2018-05-02 DIAGNOSIS — E03.9 HYPOTHYROIDISM: ICD-10-CM

## 2018-05-02 DIAGNOSIS — E78.5 HYPERLIPIDEMIA LDL GOAL <100: ICD-10-CM

## 2018-05-04 RX ORDER — LEVOTHYROXINE SODIUM 150 UG/1
TABLET ORAL
Qty: 30 TABLET | Refills: 0 | Status: SHIPPED | OUTPATIENT
Start: 2018-05-04 | End: 2018-06-11

## 2018-05-04 RX ORDER — SIMVASTATIN 10 MG
TABLET ORAL
Qty: 30 TABLET | Refills: 0 | Status: SHIPPED | OUTPATIENT
Start: 2018-05-04 | End: 2018-06-18

## 2018-05-04 NOTE — TELEPHONE ENCOUNTER
zocor      Last Written Prescription Date:  2/8/18  Last Fill Quantity: 30,   # refills: 2  Last Office Visit: 12/8/17  Future Office visit:    Next 5 appointments (look out 90 days)     May 18, 2018  9:00 AM CDT   (Arrive by 8:45 AM)   PHYSICAL with Clarissa Santos MD   [facility] ([facility] )    [address]  [place] [address]   [phone]                   levothyroxine      Last Written Prescription Date:  2/12/18  Last Fill Quantity: 30,   # refills: 2  Last Office Visit: 12/8/17  Future Office visit:    Next 5 appointments (look out 90 days)     May 18, 2018  9:00 AM CDT   (Arrive by 8:45 AM)   PHYSICAL with Clarissa Santos MD   [facility] ([facility] )    [address]  [place] [address]   [phone]

## 2018-05-18 ENCOUNTER — OFFICE VISIT (OUTPATIENT)
Dept: FAMILY MEDICINE | Facility: OTHER | Age: 48
End: 2018-05-18
Attending: FAMILY MEDICINE
Payer: MEDICARE

## 2018-05-18 VITALS
DIASTOLIC BLOOD PRESSURE: 58 MMHG | SYSTOLIC BLOOD PRESSURE: 106 MMHG | HEART RATE: 68 BPM | WEIGHT: 116 LBS | RESPIRATION RATE: 18 BRPM | OXYGEN SATURATION: 97 % | BODY MASS INDEX: 26.85 KG/M2 | HEIGHT: 55 IN | TEMPERATURE: 96.9 F

## 2018-05-18 DIAGNOSIS — E03.9 HYPOTHYROIDISM, UNSPECIFIED TYPE: ICD-10-CM

## 2018-05-18 DIAGNOSIS — E78.5 HYPERLIPIDEMIA, UNSPECIFIED HYPERLIPIDEMIA TYPE: ICD-10-CM

## 2018-05-18 DIAGNOSIS — R56.9 CONVULSIONS, UNSPECIFIED CONVULSION TYPE (H): ICD-10-CM

## 2018-05-18 DIAGNOSIS — L98.9 SKIN LESION: ICD-10-CM

## 2018-05-18 DIAGNOSIS — Z00.00 ROUTINE GENERAL MEDICAL EXAMINATION AT A HEALTH CARE FACILITY: Primary | ICD-10-CM

## 2018-05-18 LAB
ALBUMIN SERPL-MCNC: 3.2 G/DL (ref 3.4–5)
ALP SERPL-CCNC: 92 U/L (ref 40–150)
ALT SERPL W P-5'-P-CCNC: 34 U/L (ref 0–50)
ANION GAP SERPL CALCULATED.3IONS-SCNC: 9 MMOL/L (ref 3–14)
AST SERPL W P-5'-P-CCNC: 23 U/L (ref 0–45)
BILIRUB SERPL-MCNC: 0.4 MG/DL (ref 0.2–1.3)
BUN SERPL-MCNC: 16 MG/DL (ref 7–30)
CALCIUM SERPL-MCNC: 9.3 MG/DL (ref 8.5–10.1)
CHLORIDE SERPL-SCNC: 104 MMOL/L (ref 94–109)
CHOLEST SERPL-MCNC: 123 MG/DL
CO2 SERPL-SCNC: 26 MMOL/L (ref 20–32)
CREAT SERPL-MCNC: 0.92 MG/DL (ref 0.52–1.04)
ERYTHROCYTE [DISTWIDTH] IN BLOOD BY AUTOMATED COUNT: 12.9 % (ref 10–15)
GFR SERPL CREATININE-BSD FRML MDRD: 66 ML/MIN/1.7M2
GLUCOSE SERPL-MCNC: 85 MG/DL (ref 70–99)
HCT VFR BLD AUTO: 43 % (ref 35–47)
HDLC SERPL-MCNC: 43 MG/DL
HGB BLD-MCNC: 14.1 G/DL (ref 11.7–15.7)
LDLC SERPL CALC-MCNC: 43 MG/DL
MCH RBC QN AUTO: 33.7 PG (ref 26.5–33)
MCHC RBC AUTO-ENTMCNC: 32.8 G/DL (ref 31.5–36.5)
MCV RBC AUTO: 103 FL (ref 78–100)
NONHDLC SERPL-MCNC: 80 MG/DL
PLATELET # BLD AUTO: 204 10E9/L (ref 150–450)
POTASSIUM SERPL-SCNC: 4.6 MMOL/L (ref 3.4–5.3)
PROT SERPL-MCNC: 7 G/DL (ref 6.8–8.8)
RBC # BLD AUTO: 4.18 10E12/L (ref 3.8–5.2)
SODIUM SERPL-SCNC: 139 MMOL/L (ref 133–144)
TRIGL SERPL-MCNC: 186 MG/DL
TSH SERPL DL<=0.005 MIU/L-ACNC: 0.14 MU/L (ref 0.4–4)
WBC # BLD AUTO: 6 10E9/L (ref 4–11)

## 2018-05-18 PROCEDURE — 80061 LIPID PANEL: CPT | Mod: ZL | Performed by: FAMILY MEDICINE

## 2018-05-18 PROCEDURE — 85027 COMPLETE CBC AUTOMATED: CPT | Mod: ZL | Performed by: FAMILY MEDICINE

## 2018-05-18 PROCEDURE — 99396 PREV VISIT EST AGE 40-64: CPT | Performed by: FAMILY MEDICINE

## 2018-05-18 PROCEDURE — 80053 COMPREHEN METABOLIC PANEL: CPT | Mod: ZL | Performed by: FAMILY MEDICINE

## 2018-05-18 PROCEDURE — 36415 COLL VENOUS BLD VENIPUNCTURE: CPT | Mod: ZL | Performed by: FAMILY MEDICINE

## 2018-05-18 PROCEDURE — 84443 ASSAY THYROID STIM HORMONE: CPT | Mod: ZL | Performed by: FAMILY MEDICINE

## 2018-05-18 ASSESSMENT — PAIN SCALES - GENERAL: PAINLEVEL: NO PAIN (0)

## 2018-05-18 NOTE — LETTER
May 18, 2018      Aaliyah Dean  [address]   [facility] [id number]        Dear Aaliyah,     Should recheck TSH in 3 months.  Other labs normal/negative.    Results for orders placed or performed in visit on 05/18/18   TSH   Result Value Ref [facility]    TSH 0.14 (L) 0.40 - 4.00 mU/L   Lipid Profile   Result Value Ref [facility]    Cholesterol 123 <200 mg/dL    Triglycerides 186 (H) <150 mg/dL    HDL Cholesterol 43 (L) >49 mg/dL    LDL Cholesterol Calculated 43 <100 mg/dL    Non HDL Cholesterol 80 <130 mg/dL   Comprehensive metabolic panel   Result Value Ref [facility]    Sodium 139 133 - 144 mmol/L    Potassium 4.6 3.4 - 5.3 mmol/L    Chloride 104 94 - 109 mmol/L    Carbon Dioxide 26 20 - 32 mmol/L    Anion Gap 9 3 - 14 mmol/L    Glucose 85 70 - 99 mg/dL    Urea Nitrogen 16 7 - 30 mg/dL    Creatinine 0.92 0.52 - 1.04 mg/dL    GFR Estimate 66 >60 mL/min/1.7m2    GFR Estimate If Black 80 >60 mL/min/1.7m2    Calcium 9.3 8.5 - 10.1 mg/dL    Bilirubin Total 0.4 0.2 - 1.3 mg/dL    Albumin 3.2 (L) 3.4 - 5.0 g/dL    Protein Total 7.0 6.8 - 8.8 g/dL    Alkaline Phosphatase 92 40 - 150 U/L    ALT 34 0 - 50 U/L    AST 23 0 - 45 U/L   CBC with platelets   Result Value Ref [facility]    WBC 6.0 4.0 - 11.0 10e9/L    RBC Count 4.18 3.8 - 5.2 10e12/L    Hemoglobin 14.1 11.7 - 15.7 g/dL    Hematocrit 43.0 35.0 - 47.0 %     (H) 78 - 100 fl    MCH 33.7 (H) 26.5 - 33.0 pg    MCHC 32.8 31.5 - 36.5 g/dL    RDW 12.9 10.0 - 15.0 %    Platelet Count 204 150 - 450 10e9/L           Sincerely,        Clarissa Santos MD

## 2018-05-18 NOTE — PROGRESS NOTES
SUBJECTIVE:   CC: Aaliyah Dean is an 47 year old woman who presents for preventive health visit.     Healthy Habits:    Do you get at least three servings of calcium containing foods daily (dairy, green leafy vegetables, etc.)? yes    Amount of exercise or daily activities, outside of work: 0.5 hour(s) per day and olympic training twice a week    Problems taking medications regularly No    Medication side effects: No    Have you had an eye exam in the past two years? unknown    Do you see a dentist twice per year? yes    Do you have sleep apnea, excessive snoring or daytime drowsiness?no      Hyperlipidemia Follow-Up      Rate your low fat/cholesterol diet?: good    Taking statin?  Yes, no muscle aches from statin    Other lipid medications/supplements?:  none    Hypothyroidism Follow-up      Since last visit, patient describes the following symptoms: Weight stable, no hair loss, no skin changes, no constipation, no loose stools      Today's PHQ-2 Score: No flowsheet data found.    Abuse: Current or Past(Physical, Sexual or Emotional)- No  Do you feel safe in your environment - Yes    Social History   Substance Use Topics     Smoking status: Never Smoker     Smokeless tobacco: Never Used      Comment: no passive exposure     Alcohol use No     If you drink alcohol do you typically have >3 drinks per day or >7 drinks per week? No                     Reviewed orders with patient.  Reviewed health maintenance and updated orders accordingly - Yes  Patient Active Problem List   Diagnosis     Down's syndrome     Hypothyroidism     Hyperlipidemia     Disturbance of conduct     Convulsions (H)     Dysphagia     Allergic rhinitis     Other acne     Impulse control disorder     Past Surgical History:   Procedure Laterality Date     CHOLECYSTECTOMY  01/2011     ENT SURGERY       EXAM UNDER ANESTHESIA PELVIC N/A 5/4/2016    Procedure: EXAM UNDER ANESTHESIA PELVIC;  Surgeon: Valentin Ferris MD;  Location: [facility]      GENITOURINARY SURGERY      INTERNAL URETHROTOMY     HERNIA REPAIR  march 4 2015    ventral herniorrhaphy with intraperitoneal sepramesh     ileostomy take-down  2011     illeostomy  04/25/2011     ventilation tubes, bilateral         Social History   Substance Use Topics     Smoking status: Never Smoker     Smokeless tobacco: Never Used      Comment: no passive exposure     Alcohol use No     Family History   Problem Relation Age of Onset     CANCER Mother      cause of death     DIABETES No family hx of          Current Outpatient Prescriptions   Medication Sig Dispense Refill     ascorbic acid (VITAMIN C) 500 MG tablet TAKE 1 TABLET DAILY BY MOUTH 100 tablet 2     Calcium Carb-Cholecalciferol (CALCIUM-VITAMIN D3) 250-125 MG-UNIT TABS Take 1 tablet by mouth 2 times daily Take with meals 100 tablet 5     chlorhexidine (PERIDEX) 0.12 % solution SWAB ON TEETH AND GUMS FOR 30 SECONDS TWICE A  mL 0     ferrous sulfate (IRON) 325 (65 FE) MG tablet Take one (1) tablet BY MOUTH twice daily 100 tablet 2     fish oil-omega-3 fatty acids 1000 MG capsule TAKE ONE (1) CAPSULE BY MOUTH THREE TIMES DAILY 100 capsule 5     fluticasone (FLONASE) 50 MCG/ACT spray INSTIL 2 SPRAYS INTO BOTH NOSTRIL DAILY SHAKE GENTLY 16 g 3     guanFACINE (TENEX) 1 MG tablet Take 1/2 tablet BY MOUTH three times daily 45 tablet 5     ibuprofen (ADVIL,MOTRIN) 400 MG tablet Take 1 tablet by mouth every 4-6 hours as needed 120 tablet 2     INTROVALE 0.15-0.03 MG per tablet TAKE 1 TABLET BY MOUTH DAILY. 91 tablet 1     levothyroxine (SYNTHROID/LEVOTHROID) 150 MCG tablet TAKE ONE (1) TABLET BY MOUTH DAILY 30 tablet 0     loratadine (CLARITIN) 10 MG tablet Take 1 tablet (10 mg) by mouth daily as needed for allergies 30 tablet 5     omeprazole (PRILOSEC) 20 MG CR capsule Take one (1) capsule BY MOUTH daily 90 capsule 2     risperiDONE (RISPERDAL) 0.25 MG tablet Take one (1) tablet BY MOUTH three times daily 90 tablet 3     simvastatin (ZOCOR) 10 MG  "tablet TAKE ONE (1) TABLET BY MOUTH DAILY 30 tablet 0     Skin Protectants, Misc. (EUCERIN) cream        triamcinolone (KENALOG) 0.1 % cream Apply sparingly to affected area twice daily as needed 80 g 1     Allergies   Allergen Reactions     Amoxicillin      Cephalexin Monohydrate      Keflex         Patient under age 50, mutual decision reflected in health maintenance.      Pertinent mammograms are reviewed under the imaging tab.  History of abnormal Pap smear: NO - age 30- 65 PAP every 3 years recommended    Reviewed and updated as needed this visit by clinical staff  Tobacco  Allergies  Meds  Problems  Med Hx  Surg Hx  Fam Hx  Soc Hx          Reviewed and updated as needed this visit by Provider            ROS:  CONSTITUTIONAL: NEGATIVE for fever, chills, change in weight  INTEGUMENTARU/SKIN: NEGATIVE for worrisome rashes, moles or lesions  EYES: NEGATIVE for vision changes or irritation  ENT: NEGATIVE for ear, mouth and throat problems  RESP: NEGATIVE for significant cough or SOB  BREAST: NEGATIVE for masses, tenderness or discharge  CV: NEGATIVE for chest pain, palpitations or peripheral edema  GI: NEGATIVE for nausea, abdominal pain, heartburn, or change in bowel habits  : NEGATIVE for unusual urinary or vaginal symptoms. Periods are regular.  MUSCULOSKELETAL: NEGATIVE for significant arthralgias or myalgia  NEURO: NEGATIVE for weakness, dizziness or paresthesias  PSYCHIATRIC: NEGATIVE for changes in mood or affect    OBJECTIVE:   /58 (BP Location: Right arm, Patient Position: Sitting, Cuff Size: Adult Regular)  Pulse 68  Temp 96.9  F (36.1  C) (Tympanic)  Resp 18  Ht 4' 7.25\" (1.403 m)  Wt 116 lb (52.6 kg)  SpO2 97%  BMI 26.72 kg/m2  EXAM:  GENERAL: healthy, alert and no distress  EYES: Eyes grossly normal to inspection, PERRL and conjunctivae and sclerae normal  HENT: ear canals and TM's normal, nose and mouth without ulcers or lesions  NECK: no adenopathy  RESP: lungs clear to " "auscultation - no rales, rhonchi or wheezes  CV: regular rate and rhythm, normal S1 S2, no S3 or S4, no murmur, click or rub, no peripheral edema and peripheral pulses strong  ABDOMEN: soft, nontender, no hepatosplenomegaly, no masses and bowel sounds normal  MS: no gross musculoskeletal defects noted, no edema  SKIN: crusted skin lesion on left facial cheek  NEURO: Normal strength and tone, mentation intact and speech normal  PSYCH: mentation appears normal, affect normal/bright    ASSESSMENT/PLAN:       ICD-10-CM    1. Routine general medical examination at a health care facility Z00.00    2. Hyperlipidemia, unspecified hyperlipidemia type E78.5 Lipid Profile   3. Hypothyroidism, unspecified type E03.9 TSH   4. Convulsions, unspecified convulsion type (H) R56.9 Comprehensive metabolic panel     CBC with platelets   5. Skin lesion L98.9 GENERAL SURG ADULT REFERRAL       COUNSELING:   Reviewed preventive health counseling, as reflected in patient instructions         reports that she has never smoked. She has never used smokeless tobacco.    Estimated body mass index is 26.72 kg/(m^2) as calculated from the following:    Height as of this encounter: 4' 7.25\" (1.403 m).    Weight as of this encounter: 116 lb (52.6 kg).       Counseling Resources:  ATP IV Guidelines  Pooled Cohorts Equation Calculator  Breast Cancer Risk Calculator  FRAX Risk Assessment  ICSI Preventive Guidelines  Dietary Guidelines for Americans, 2010  USDA's MyPlate  ASA Prophylaxis  Lung CA Screening      Clarissa Santos MD  [facility] [place]  "

## 2018-05-18 NOTE — MR AVS SNAPSHOT
After Visit Summary   5/18/2018    Aaliyah Dean    MRN: [id number]           Patient Information     Date Of Birth          1970        Visit Information        Provider Department      5/18/2018 9:00 AM Clarissa Santos MD [facility]        Today's Diagnoses     Routine general medical examination at a health care facility    -  1    Hyperlipidemia, unspecified hyperlipidemia type        Hypothyroidism, unspecified type        Convulsions, unspecified convulsion type (H)        Skin lesion          Care Instructions      Preventive Health Recommendations  Female Ages 40 to 49    Yearly exam:     See your health care provider every year in order to  1. Review health changes.   2. Discuss preventive care.    3. Review your medicines if your doctor prescribed any.      Get a Pap test every three years (unless you have an abnormal result and your provider advises testing more often).      If you get Pap tests with HPV test, you only need to test every 5 years, unless you have an abnormal result. You do not need a Pap test if your uterus was removed (hysterectomy) and you have not had cancer.      You should be tested each year for STDs (sexually transmitted diseases), if you're at risk.       Ask your doctor if you should have a mammogram.      Have a colonoscopy (test for colon cancer) if someone in your family has had colon cancer or polyps before age 50.       Have a cholesterol test every 5 years.       Have a diabetes test (fasting glucose) after age 45. If you are at risk for diabetes, you should have this test every 3 years.    Shots: Get a flu shot each year. Get a tetanus shot every 10 years.     Nutrition:     Eat at least 5 servings of fruits and vegetables each day.    Eat whole-grain bread, whole-wheat pasta and brown rice instead of white grains and rice.    Talk to your provider about Calcium and Vitamin D.     Lifestyle    Exercise at least 150 minutes a week  (an average of 30 minutes a day, 5 days a week). This will help you control your weight and prevent disease.    Limit alcohol to one drink per day.    No smoking.     Wear sunscreen to prevent skin cancer.    See your dentist every six months for an exam and cleaning.          Follow-ups after your visit        Additional Services     GENERAL SURG ADULT REFERRAL       Your provider has referred you to: General Surgery - excision of skin lesion on facial cheek - will need sedation    Please be aware that coverage of these services is subject to the terms and limitations of your health insurance plan.  Call member services at your health plan with any benefit or coverage questions.      Please bring the following with you to your appointment:    (1) Any X-Rays, CTs or MRIs which have been performed.  Contact the facility where they were done to arrange for  prior to your scheduled appointment.   (2) List of current medications   (3) This referral request   (4) Any documents/labs given to you for this referral                  Follow-up notes from your care team     Return in about 6 months (around 11/18/2018).      Who to contact     If you have questions or need follow up information about today's clinic visit or your schedule please contact [facility] (formerly [facility]) directly at [phone].  Normal or non-critical lab and imaging results will be communicated to you by MyChart, letter or phone within 4 business days after the clinic has received the results. If you do not hear from us within 7 days, please contact the clinic through MyChart or phone. If you have a critical or abnormal lab result, we will notify you by phone as soon as possible.  Submit refill requests through [employer] or call your pharmacy and they will forward the refill request to us. Please allow 3 business days for your refill to be completed.          Additional Information About Your Visit        [employer] Information     [employer]ite lets you  "send messages to your doctor, view your test results, renew your prescriptions, schedule appointments and more. To sign up, go to [email or web address] . Click on \"Log in\" on the left side of the screen, which will take you to the Welcome page. Then click on \"Sign up Now\" on the right side of the page.     You will be asked to enter the access code listed below, as well as some personal information. Please follow the directions to create your username and password.     Your access code is: [email or web address]  Expires: 2018  9:18 AM     Your access code will  in 90 days. If you need help or a new code, please call your [place] clinic or [phone].        Care EveryWhere ID     This is your Care EveryWhere ID. This could be used by other organizations to access your [place] medical records  [id number]        Your Vitals Were     Pulse Temperature Respirations Height Pulse Oximetry BMI (Body Mass Index)    68 96.9  F (36.1  C) (Tympanic) 18 4' 7.25\" (1.403 m) 97% 26.72 kg/m2       Blood Pressure from Last 3 Encounters:   18 106/58   17 100/60   17 120/72    Weight from Last 3 Encounters:   18 116 lb (52.6 kg)   17 123 lb (55.8 kg)   17 120 lb 6.4 oz (54.6 kg)              We Performed the Following     CBC with platelets     Comprehensive metabolic panel     GENERAL SURG ADULT REFERRAL     Lipid Profile     TSH        Primary Care Provider Office Phone # Fax #    Clarissa Santos [phone] [address] [facility] [address]        Equal Access to Services     ENDER AMAYA AH: Nurys Martinez, promise lauren, helen tairq. So [facility] [phone].    ATENCIÓN: Si habla español, tiene a paulino disposición servicios gratuitos de asistencia lingüística. Llame al [phone].    We comply with applicable federal civil rights laws and [place] laws. We do not discriminate " on the basis of race, color, national origin, age, disability, sex, sexual orientation, or gender identity.            Thank you!     Thank you for choosing [facility]  for your care. Our goal is always to provide you with excellent care. Hearing back from our patients is one way we can continue to improve our services. Please take a few minutes to complete the written survey that you may receive in the mail after your visit with us. Thank you!             Your Updated Medication List - Protect others around you: Learn how to safely use, store and throw away your medicines at www.disposemymeds.org.          This list is accurate as of 5/18/18  9:18 AM.  Always use your most recent med list.                   Brand Name Dispense Instructions for use Diagnosis    ascorbic acid 500 MG tablet    VITAMIN C    100 tablet    TAKE 1 TABLET DAILY BY MOUTH    Health care maintenance       Calcium-Vitamin D3 250-125 MG-UNIT Tabs     100 tablet    Take 1 tablet by mouth 2 times daily Take with meals    Health care maintenance       chlorhexidine 0.12 % solution    PERIDEX    473 mL    SWAB ON TEETH AND GUMS FOR 30 SECONDS TWICE A DAY    Health care maintenance       eucerin cream           ferrous sulfate 325 (65 Fe) MG tablet    IRON    100 tablet    Take one (1) tablet BY MOUTH twice daily    Healthcare maintenance       fish oil-omega-3 fatty acids 1000 MG capsule     100 capsule    TAKE ONE (1) CAPSULE BY MOUTH THREE TIMES DAILY    Healthcare maintenance       fluticasone 50 MCG/ACT spray    FLONASE    16 g    INSTIL 2 SPRAYS INTO BOTH NOSTRIL DAILY SHAKE GENTLY    Chronic sinusitis, unspecified location       guanFACINE 1 MG tablet    TENEX    45 tablet    Take 1/2 tablet BY MOUTH three times daily    Impulse control disease       ibuprofen 400 MG tablet    ADVIL/MOTRIN    120 tablet    Take 1 tablet by mouth every 4-6 hours as needed    Health care maintenance       INTROVALE 0.15-0.03 MG per tablet    Generic drug:  levonorgestrel-ethinyl estradiol     91 tablet    TAKE 1 TABLET BY MOUTH DAILY.    Down's syndrome       levothyroxine 150 MCG tablet    SYNTHROID/LEVOTHROID    30 tablet    TAKE ONE (1) TABLET BY MOUTH DAILY    Hypothyroidism       loratadine 10 MG tablet    CLARITIN    30 tablet    Take 1 tablet (10 mg) by mouth daily as needed for allergies    Dysfunction of Eustachian tube, bilateral       omeprazole 20 MG CR capsule    priLOSEC    90 capsule    Take one (1) capsule BY MOUTH daily    Dysphagia, unspecified type       risperiDONE 0.25 MG tablet    risperDAL    90 tablet    Take one (1) tablet BY MOUTH three times daily    Tourette's disorder       simvastatin 10 MG tablet    ZOCOR    30 tablet    TAKE ONE (1) TABLET BY MOUTH DAILY    Hyperlipidemia LDL goal <100       triamcinolone 0.1 % cream    KENALOG    80 g    Apply sparingly to affected area twice daily as needed    Eczema, unspecified type

## 2018-05-18 NOTE — NURSING NOTE
"Chief Complaint   Patient presents with     Physical     [facility] Resident       Initial /58 (BP Location: Right arm, Patient Position: Sitting, Cuff Size: Adult Regular)  Pulse 68  Temp 96.9  F (36.1  C) (Tympanic)  Resp 18  Ht 4' 7.25\" (1.403 m)  Wt 116 lb (52.6 kg)  SpO2 97%  BMI 26.72 kg/m2 Estimated body mass index is 26.72 kg/(m^2) as calculated from the following:    Height as of this encounter: 4' 7.25\" (1.403 m).    Weight as of this encounter: 116 lb (52.6 kg).  Medication Reconciliation: complete    Karli Emerson LPN    "

## 2018-05-29 DIAGNOSIS — J32.9 CHRONIC SINUSITIS, UNSPECIFIED LOCATION: ICD-10-CM

## 2018-05-30 RX ORDER — FLUTICASONE PROPIONATE 50 MCG
SPRAY, SUSPENSION (ML) NASAL
Qty: 16 G | Refills: 10 | Status: SHIPPED | OUTPATIENT
Start: 2018-05-30 | End: 2019-06-10

## 2018-05-30 NOTE — TELEPHONE ENCOUNTER
Flonase  Last Written Prescription Date: 1/4/18  Last Fill Quantity: 16g # of Refills: 3  Last Office Visit: 5/18/18

## 2018-06-04 DIAGNOSIS — Z00.00 HEALTH CARE MAINTENANCE: ICD-10-CM

## 2018-06-05 RX ORDER — ASCORBIC ACID 500 MG
TABLET ORAL
Qty: 100 TABLET | Refills: 2 | Status: SHIPPED | OUTPATIENT
Start: 2018-06-05 | End: 2018-12-17

## 2018-06-05 NOTE — TELEPHONE ENCOUNTER
Vitamin C  Last Written Prescription Date: 5/8/17  Last Fill Quantity: 100 # of Refills: 2  Last Office Visit: 5/18/18

## 2018-06-08 ENCOUNTER — TELEPHONE (OUTPATIENT)
Dept: FAMILY MEDICINE | Facility: OTHER | Age: 48
End: 2018-06-08

## 2018-06-08 NOTE — TELEPHONE ENCOUNTER
Guardian Franci called for patient.She will be faxing guardianship papers.Patient has appointment on Monday with you.Dx down syndrome,impule control disorder.Facitliy staff will be bringing patient.She has concerns for patient.Increase aggression in self,others.Signs of anxiety and depression.Crying outbursts where she states she feels sad and then cannot explain.Hits self and others.This has increaed in the last year.She is on Risperdal.Patient sensitive to medications and prefer not to add more.Does not know if pain related. She did have a twisted intestine,and ten hernia surgery.She is wondering if hormone levels could be checked d/t her age and possibly menopause.Thank you.

## 2018-06-08 NOTE — TELEPHONE ENCOUNTER
9:20 AM    Reason for Call: Phone Call    Description: pt's legal guardian called in stating she has some concerns about an upcoming appt; and would like to email these concerns in possible.  Her email is [email or web address].  She would not state what the concerns were, she preferred to discuss them directly with the nurse.      Was an appointment offered for this call? No  If yes : Appointment type              Date    Preferred method for responding to this message: email  What is your phone number ?    If we cannot reach you directly, may we leave a detailed response at the number you provided? You can call her if necessary at [phone], but she prefers email.     Can this message wait until your PCP/provider returns, if available today? Not applicable, provider is in today.      Dionne Tavarez

## 2018-06-08 NOTE — TELEPHONE ENCOUNTER
Patient has appointment on 5.11.18.Called back and update on voicemail to use [employer] to communicate or call back with concerns.

## 2018-06-11 ENCOUNTER — OFFICE VISIT (OUTPATIENT)
Dept: FAMILY MEDICINE | Facility: OTHER | Age: 48
End: 2018-06-11
Attending: NURSE PRACTITIONER
Payer: MEDICARE

## 2018-06-11 VITALS
OXYGEN SATURATION: 97 % | WEIGHT: 116.8 LBS | HEART RATE: 87 BPM | DIASTOLIC BLOOD PRESSURE: 62 MMHG | RESPIRATION RATE: 16 BRPM | SYSTOLIC BLOOD PRESSURE: 100 MMHG | BODY MASS INDEX: 27.03 KG/M2 | TEMPERATURE: 97.4 F | HEIGHT: 55 IN

## 2018-06-11 DIAGNOSIS — Z79.899 OTHER LONG TERM (CURRENT) DRUG THERAPY: ICD-10-CM

## 2018-06-11 DIAGNOSIS — F95.2 TOURETTE'S DISORDER: ICD-10-CM

## 2018-06-11 DIAGNOSIS — E03.9 HYPOTHYROIDISM: ICD-10-CM

## 2018-06-11 DIAGNOSIS — F43.21 GRIEF REACTION: Primary | ICD-10-CM

## 2018-06-11 LAB — FSH SERPL-ACNC: <0.2 IU/L

## 2018-06-11 PROCEDURE — 82306 VITAMIN D 25 HYDROXY: CPT | Mod: ZL | Performed by: NURSE PRACTITIONER

## 2018-06-11 PROCEDURE — 36415 COLL VENOUS BLD VENIPUNCTURE: CPT | Mod: ZL | Performed by: NURSE PRACTITIONER

## 2018-06-11 PROCEDURE — G0463 HOSPITAL OUTPT CLINIC VISIT: HCPCS

## 2018-06-11 PROCEDURE — 99214 OFFICE O/P EST MOD 30 MIN: CPT | Performed by: NURSE PRACTITIONER

## 2018-06-11 PROCEDURE — 83001 ASSAY OF GONADOTROPIN (FSH): CPT | Mod: ZL | Performed by: NURSE PRACTITIONER

## 2018-06-11 RX ORDER — CITALOPRAM HYDROBROMIDE 10 MG/1
10 TABLET ORAL DAILY
Qty: 30 TABLET | Refills: 0 | Status: SHIPPED | OUTPATIENT
Start: 2018-06-11 | End: 2018-07-09

## 2018-06-11 ASSESSMENT — PAIN SCALES - GENERAL: PAINLEVEL: MILD PAIN (2)

## 2018-06-11 NOTE — PROGRESS NOTES
SUBJECTIVE:   Aaliyah Dean is a 47 year old female who presents to clinic today for the following health issues:  Concerns for weight loss and feeling sad    Concern - weight loss feeling sad  Onset: since bowel surgery here and there feeling sad a couple weeks now    Description:   Does not want to eat fruits and vegetables     Intensity: mild    Progression of Symptoms:  same    Accompanying Signs & Symptoms:  Feeling sad does not know why     Previous history of similar problem:   no    Precipitating factors:   Worsened by: Possibly from recent death in the family was notified on mothers day     Alleviating factors:  Improved by: inknown    Therapies Tried and outcome: none    She is here today with staff from [facility].  Staff note that her sisters were to be here today as well but are not.  Staff note a death in the family and since she has been more sad and acting out.  Staff also note that if she does act out, she is easily redirected.  As for decreased food intake, staff note she eats most of her meals, but every now and then she will not eat her corn or other veggie.  Staff note this change occurred when she started helping to dish up meals.       Problem list and histories reviewed & adjusted, as indicated.  Additional history: as documented    Patient Active Problem List   Diagnosis     Down's syndrome     Hypothyroidism     Hyperlipidemia     Disturbance of conduct     Convulsions (H)     Dysphagia     Allergic rhinitis     Other acne     Impulse control disorder     Past Surgical History:   Procedure Laterality Date     CHOLECYSTECTOMY  01/2011     ENT SURGERY       EXAM UNDER ANESTHESIA PELVIC N/A 5/4/2016    Procedure: EXAM UNDER ANESTHESIA PELVIC;  Surgeon: Valentin Ferris MD;  Location: [facility]     GENITOURINARY SURGERY      INTERNAL URETHROTOMY     HERNIA REPAIR  march 4 2015    ventral herniorrhaphy with intraperitoneal sepramesh     ileostomy take-down  2011     illeostomy  04/25/2011      ventilation tubes, bilateral         Social History   Substance Use Topics     Smoking status: Never Smoker     Smokeless tobacco: Never Used      Comment: no passive exposure     Alcohol use No     Family History   Problem Relation Age of Onset     CANCER Mother      cause of death     DIABETES No family hx of          Current Outpatient Prescriptions   Medication Sig Dispense Refill     ascorbic acid (VITAMIN C) 500 MG tablet TAKE ONE (1) TABLET BY MOUTH DAILY 100 tablet 2     Calcium Carb-Cholecalciferol (CALCIUM-VITAMIN D3) 250-125 MG-UNIT TABS Take 1 tablet by mouth 2 times daily Take with meals 100 tablet 5     chlorhexidine (PERIDEX) 0.12 % solution SWAB ON TEETH AND GUMS FOR 30 SECONDS TWICE A  mL 0     ferrous sulfate (IRON) 325 (65 FE) MG tablet Take one (1) tablet BY MOUTH twice daily 100 tablet 2     fish oil-omega-3 fatty acids 1000 MG capsule TAKE ONE (1) CAPSULE BY MOUTH THREE TIMES DAILY 100 capsule 5     fluticasone (FLONASE) 50 MCG/ACT spray INSTILL 2 SPRAYS INTO BOTH NOSTRILS DAILY SHAKE GENTLY 16 g 10     guanFACINE (TENEX) 1 MG tablet Take 1/2 tablet BY MOUTH three times daily 45 tablet 5     ibuprofen (ADVIL,MOTRIN) 400 MG tablet Take 1 tablet by mouth every 4-6 hours as needed 120 tablet 2     INTROVALE 0.15-0.03 MG per tablet TAKE 1 TABLET BY MOUTH DAILY. 91 tablet 1     levothyroxine (SYNTHROID/LEVOTHROID) 150 MCG tablet TAKE ONE (1) TABLET BY MOUTH DAILY 30 tablet 0     loratadine (CLARITIN) 10 MG tablet Take 1 tablet (10 mg) by mouth daily as needed for allergies 30 tablet 5     omeprazole (PRILOSEC) 20 MG CR capsule Take one (1) capsule BY MOUTH daily 90 capsule 2     risperiDONE (RISPERDAL) 0.25 MG tablet Take one (1) tablet BY MOUTH three times daily 90 tablet 3     simvastatin (ZOCOR) 10 MG tablet TAKE ONE (1) TABLET BY MOUTH DAILY 30 tablet 0     Skin Protectants, Misc. (EUCERIN) cream        triamcinolone (KENALOG) 0.1 % cream Apply sparingly to affected area twice daily as  "needed 80 g 1     Allergies   Allergen Reactions     Amoxicillin      Cephalexin Monohydrate      Keflex       Recent Labs   Lab Test  05/18/18   0915  08/22/17   0850  05/16/17   0924  10/07/16   1551  04/05/16   0942   LDL  43   --   37   --   64   HDL  43*   --   52   --   45*   TRIG  186*   --   202*   --   226*   ALT  34   --   22  23  40   CR  0.92   --   0.98  1.03  1.00   GFRESTIMATED  66   --   62  58*  60*   GFRESTBLACK  80   --   75  70  73   POTASSIUM  4.6   --   4.4  4.7  4.5   TSH  0.14*  3.46  4.60*   --   1.09      BP Readings from Last 3 Encounters:   06/11/18 100/62   05/18/18 106/58   12/08/17 100/60    Wt Readings from Last 3 Encounters:   06/11/18 116 lb 12.8 oz (53 kg)   05/18/18 116 lb (52.6 kg)   12/08/17 123 lb (55.8 kg)                    Reviewed and updated as needed this visit by clinical staff  Tobacco  Allergies       Reviewed and updated as needed this visit by Provider         ROS:  Constitutional, HEENT, cardiovascular, pulmonary, gi and gu systems are negative, except as otherwise noted.    OBJECTIVE:     /62 (BP Location: Left arm, Patient Position: Chair, Cuff Size: Adult Regular)  Pulse 87  Temp 97.4  F (36.3  C) (Tympanic)  Resp 16  Ht 4' 7.25\" (1.403 m)  Wt 116 lb 12.8 oz (53 kg)  SpO2 97%  BMI 26.9 kg/m2  Body mass index is 26.9 kg/(m^2).  GENERAL: well groomed, cooperative and no distress  RESP: lungs clear to auscultation - no rales, rhonchi or wheezes  CV: regular rate and rhythm, normal S1 S2, no S3 or S4,   MS: no gross musculoskeletal defects noted, no edema  PSYCH: mentation stable, smiling      ASSESSMENT/PLAN:       1. Tourette's disorder  Continue current plan    2. Grief reaction  - start citalopram (CELEXA) 10 MG tablet; Take 1 tablet (10 mg) by mouth daily  Dispense: 30 tablet; Refill: 0  - Follicle stimulating hormone  - Vitamin D Deficiency    3. Other long term (current) drug therapy   - Vitamin D level today    FUTURE APPOINTMENTS:       - " Follow-up visit in 2-3 weeks    Zulma Chaudhry NP  [facility]

## 2018-06-11 NOTE — PATIENT INSTRUCTIONS
ASSESSMENT/PLAN:       1. Tourette's disorder  Continue current plan    2. Grief reaction  - start citalopram (CELEXA) 10 MG tablet; Take 1 tablet (10 mg) by mouth daily  Dispense: 30 tablet; Refill: 0  - Follicle stimulating hormone  - Vitamin D Deficiency    3. Other long term (current) drug therapy   - Vitamin D Deficiency    FUTURE APPOINTMENTS:       - Follow-up visit in 2-3 weeks    Zulma Chaudhry NP  [facility]

## 2018-06-11 NOTE — TELEPHONE ENCOUNTER
Synthroid      Last Written Prescription Date:  5/4/2018  Last Fill Quantity: 30,   # refills: 0  Last Office Visit: 6/11/2018  Future Office visit:    Next 5 appointments (look out 90 days)     Jun 26, 2018  9:45 AM CDT   (Arrive by 9:30 AM)   SHORT with Zulma Chaudhry NP   [facility] ([facility] )    [address]  [facility] [address]   [phone]                   Routing refill request to provider for review/approval

## 2018-06-11 NOTE — NURSING NOTE
"Chief Complaint   Patient presents with     Weight Loss       Initial /62 (BP Location: Left arm, Patient Position: Chair, Cuff Size: Adult Regular)  Pulse 87  Temp 97.4  F (36.3  C) (Tympanic)  Resp 16  Ht 4' 7.25\" (1.403 m)  Wt 116 lb 12.8 oz (53 kg)  SpO2 97%  BMI 26.9 kg/m2 Estimated body mass index is 26.9 kg/(m^2) as calculated from the following:    Height as of this encounter: 4' 7.25\" (1.403 m).    Weight as of this encounter: 116 lb 12.8 oz (53 kg).  Medication Reconciliation: complete    Pamela M. Lechevalier, LPN    "

## 2018-06-11 NOTE — MR AVS SNAPSHOT
After Visit Summary   6/11/2018    Aaliyah Dean    MRN: [id number]           Patient Information     Date Of Birth          1970        Visit Information        Provider Department      6/11/2018 10:15 AM Zulma Chaudhry NP [facility]        Today's Diagnoses     Grief reaction    -  1    Tourette's disorder        Other long term (current) drug therapy           Care Instructions      ASSESSMENT/PLAN:       1. Tourette's disorder  Continue current plan    2. Grief reaction  - start citalopram (CELEXA) 10 MG tablet; Take 1 tablet (10 mg) by mouth daily  Dispense: 30 tablet; Refill: 0  - Follicle stimulating hormone  - Vitamin D Deficiency    3. Other long term (current) drug therapy   - Vitamin D Deficiency    FUTURE APPOINTMENTS:       - Follow-up visit in 2-3 weeks    Zulma Chaudhry NP  [facility]          Follow-ups after your visit        Follow-up notes from your care team     Return if symptoms worsen or fail to improve.      Your next 10 appointments already scheduled     Jun 26, 2018  9:45 AM CDT   (Arrive by 9:30 AM)   SHORT with Zulma Chaudhry NP   [facility] ([facility] )    [address] [address]   [phone]              Who to contact     If you have questions or need follow up information about today's clinic visit or your schedule please contact [facility] directly at [phone].  Normal or non-critical lab and imaging results will be communicated to you by MyChart, letter or phone within 4 business days after the clinic has received the results. If you do not hear from us within 7 days, please contact the clinic through MyChart or phone. If you have a critical or abnormal lab result, we will notify you by phone as soon as possible.  Submit refill requests through [employer] or call your pharmacy and they will forward the refill request to  "us. Please allow 3 business days for your refill to be completed.          Additional Information About Your Visit        [employer] Information     [employer] lets you send messages to your doctor, view your test results, renew your prescriptions, schedule appointments and more. To sign up, go to [email or web address] . Click on \"Log in\" on the left side of the screen, which will take you to the Welcome page. Then click on \"Sign up Now\" on the right side of the page.     You will be asked to enter the access code listed below, as well as some personal information. Please follow the directions to create your username and password.     Your access code is: [email or web address]  Expires: 2018  9:18 AM     Your access code will  in 90 days. If you need help or a new code, please call your [place] clinic or [phone].        Care EveryWhere ID     This is your [facility] EveryWhere ID. This could be used by other organizations to access your [place] medical records  [id number]        Your Vitals Were     Pulse Temperature Respirations Height Pulse Oximetry BMI (Body Mass Index)    87 97.4  F (36.3  C) (Tympanic) 16 4' 7.25\" (1.403 m) 97% 26.9 kg/m2       Blood Pressure from Last 3 Encounters:   18 100/62   18 106/58   17 100/60    Weight from Last 3 Encounters:   18 116 lb 12.8 oz (53 kg)   18 116 lb (52.6 kg)   17 123 lb (55.8 kg)              We Performed the Following     Follicle stimulating hormone     Vitamin D Deficiency          Today's Medication Changes          These changes are accurate as of 18 10:50 AM.  If you have any questions, ask your nurse or doctor.               Start taking these medicines.        Dose/Directions    citalopram 10 MG tablet   Commonly known as:  celeXA   Used for:  Grief reaction   Started by:  Zulma Chaudhry NP        Dose:  10 mg   Take 1 tablet (10 mg) by mouth daily   Quantity:  30 tablet   Refills:  0            Where to get " your medicines      These medications were sent to [facility] - [place], [place] - [address]  [address] [place] [address]     Phone:  [phone]     citalopram 10 MG tablet                Primary Care Provider Office Phone # Fax #    Clarissamarlene Santos [phone] [address] [address]        Equal Access to Services     CHUY AMAYA : Hadii aad ku hadasho Soomaali, waaxda luqadaha, qaybta kaalmada adeegyada, waxay idiin hayaan adeeg norbertoarafarheen lapeter . So [facility] [phone].    ATENCIÓN: Si habla español, tiene a paulino disposición servicios gratuitos de asistencia lingüística. [facility] [phone].    We comply with applicable federal civil rights laws and [place] laws. We do not discriminate on the basis of race, color, national origin, age, disability, sex, sexual orientation, or gender identity.            Thank you!     Thank you for choosing [facility]  for your care. Our goal is always to provide you with excellent care. Hearing back from our patients is one way we can continue to improve our services. Please take a few minutes to complete the written survey that you may receive in the mail after your visit with us. Thank you!             Your Updated Medication List - Protect others around you: Learn how to safely use, store and throw away your medicines at www.disposemymeds.org.          This list is accurate as of 6/11/18 10:50 AM.  Always use your most recent med list.                   Brand Name Dispense Instructions for use Diagnosis    ascorbic acid 500 MG tablet    VITAMIN C    100 tablet    TAKE ONE (1) TABLET BY MOUTH DAILY    Health care maintenance       Calcium-Vitamin D3 250-125 MG-UNIT Tabs     100 tablet    Take 1 tablet by mouth 2 times daily Take with meals    Health care maintenance       chlorhexidine 0.12 % solution    PERIDEX    473 mL    SWAB ON TEETH AND GUMS FOR 30 SECONDS TWICE A DAY     Health care maintenance       citalopram 10 MG tablet    celeXA    30 tablet    Take 1 tablet (10 mg) by mouth daily    Grief reaction       eucerin cream           ferrous sulfate 325 (65 Fe) MG tablet    IRON    100 tablet    Take one (1) tablet BY MOUTH twice daily    Healthcare maintenance       fish oil-omega-3 fatty acids 1000 MG capsule     100 capsule    TAKE ONE (1) CAPSULE BY MOUTH THREE TIMES DAILY    Healthcare maintenance       fluticasone 50 MCG/ACT spray    FLONASE    16 g    INSTILL 2 SPRAYS INTO BOTH NOSTRILS DAILY SHAKE GENTLY    Chronic sinusitis, unspecified location       guanFACINE 1 MG tablet    TENEX    45 tablet    Take 1/2 tablet BY MOUTH three times daily    Impulse control disease       ibuprofen 400 MG tablet    ADVIL/MOTRIN    120 tablet    Take 1 tablet by mouth every 4-6 hours as needed    Health care maintenance       INTROVALE 0.15-0.03 MG per tablet   Generic drug:  levonorgestrel-ethinyl estradiol     91 tablet    TAKE 1 TABLET BY MOUTH DAILY.    Down's syndrome       levothyroxine 150 MCG tablet    SYNTHROID/LEVOTHROID    30 tablet    TAKE ONE (1) TABLET BY MOUTH DAILY    Hypothyroidism       loratadine 10 MG tablet    CLARITIN    30 tablet    Take 1 tablet (10 mg) by mouth daily as needed for allergies    Dysfunction of Eustachian tube, bilateral       omeprazole 20 MG CR capsule    priLOSEC    90 capsule    Take one (1) capsule BY MOUTH daily    Dysphagia, unspecified type       risperiDONE 0.25 MG tablet    risperDAL    90 tablet    Take one (1) tablet BY MOUTH three times daily    Tourette's disorder       simvastatin 10 MG tablet    ZOCOR    30 tablet    TAKE ONE (1) TABLET BY MOUTH DAILY    Hyperlipidemia LDL goal <100       triamcinolone 0.1 % cream    KENALOG    80 g    Apply sparingly to affected area twice daily as needed    Eczema, unspecified type

## 2018-06-12 ENCOUNTER — DOCUMENTATION ONLY (OUTPATIENT)
Dept: OTHER | Facility: CLINIC | Age: 48
End: 2018-06-12

## 2018-06-12 PROBLEM — Z71.89 ACP (ADVANCE CARE PLANNING): Chronic | Status: ACTIVE | Noted: 2018-06-12

## 2018-06-12 LAB — DEPRECATED CALCIDIOL+CALCIFEROL SERPL-MC: 39 UG/L (ref 20–75)

## 2018-06-12 RX ORDER — LEVOTHYROXINE SODIUM 150 UG/1
TABLET ORAL
Qty: 30 TABLET | Refills: 1 | Status: SHIPPED | OUTPATIENT
Start: 2018-06-12 | End: 2018-07-11

## 2018-06-18 ENCOUNTER — TELEPHONE (OUTPATIENT)
Dept: FAMILY MEDICINE | Facility: OTHER | Age: 48
End: 2018-06-18

## 2018-06-18 DIAGNOSIS — F95.2 TOURETTE'S DISORDER: ICD-10-CM

## 2018-06-18 DIAGNOSIS — E78.5 HYPERLIPIDEMIA LDL GOAL <100: ICD-10-CM

## 2018-06-18 RX ORDER — SIMVASTATIN 10 MG
TABLET ORAL
Qty: 30 TABLET | Refills: 8 | Status: SHIPPED | OUTPATIENT
Start: 2018-06-18 | End: 2019-02-07

## 2018-06-18 RX ORDER — RISPERIDONE 0.25 MG/1
TABLET ORAL
Qty: 90 TABLET | Refills: 2 | Status: SHIPPED | OUTPATIENT
Start: 2018-06-18 | End: 2018-08-16

## 2018-06-18 NOTE — TELEPHONE ENCOUNTER
7:48 AM    Reason for Call: OVERBOok  Patient is having the following symptoms: abdominal pain for off and on for a while  The patient is requesting an appointment for Wednesday with Dr Santos    Was an appointment offered for this call? No, none available  If yes : Appointment type              Date    Preferred method for responding to this message: Telephone Call  What is your phone number ?  Franci @ [phone]    If we cannot reach you directly, may we leave a detailed response at the number you provided? Yes    Can this message wait until your PCP/provider returns, if unavailable today? Not applicable,     Belinda Pena

## 2018-06-20 ENCOUNTER — RADIANT APPOINTMENT (OUTPATIENT)
Dept: GENERAL RADIOLOGY | Facility: OTHER | Age: 48
End: 2018-06-20
Attending: FAMILY MEDICINE
Payer: MEDICARE

## 2018-06-20 ENCOUNTER — OFFICE VISIT (OUTPATIENT)
Dept: FAMILY MEDICINE | Facility: OTHER | Age: 48
End: 2018-06-20
Attending: FAMILY MEDICINE
Payer: MEDICARE

## 2018-06-20 VITALS
BODY MASS INDEX: 26.61 KG/M2 | WEIGHT: 115 LBS | SYSTOLIC BLOOD PRESSURE: 92 MMHG | DIASTOLIC BLOOD PRESSURE: 62 MMHG | RESPIRATION RATE: 14 BRPM | OXYGEN SATURATION: 98 % | HEART RATE: 82 BPM | TEMPERATURE: 96.9 F | HEIGHT: 55 IN

## 2018-06-20 DIAGNOSIS — F63.9 IMPULSE CONTROL DISORDER: ICD-10-CM

## 2018-06-20 DIAGNOSIS — R10.84 ABDOMINAL PAIN, GENERALIZED: Primary | ICD-10-CM

## 2018-06-20 LAB
ALBUMIN UR-MCNC: NEGATIVE MG/DL
APPEARANCE UR: CLEAR
BILIRUB UR QL STRIP: NEGATIVE
COLOR UR AUTO: YELLOW
GLUCOSE UR STRIP-MCNC: NEGATIVE MG/DL
HGB UR QL STRIP: NEGATIVE
KETONES UR STRIP-MCNC: NEGATIVE MG/DL
LEUKOCYTE ESTERASE UR QL STRIP: NEGATIVE
NITRATE UR QL: NEGATIVE
PH UR STRIP: 6.5 PH (ref 5–7)
SOURCE: NORMAL
SP GR UR STRIP: 1.01 (ref 1–1.03)
UROBILINOGEN UR STRIP-ACNC: 0.2 EU/DL (ref 0.2–1)

## 2018-06-20 PROCEDURE — G0463 HOSPITAL OUTPT CLINIC VISIT: HCPCS

## 2018-06-20 PROCEDURE — 99213 OFFICE O/P EST LOW 20 MIN: CPT | Performed by: FAMILY MEDICINE

## 2018-06-20 PROCEDURE — 81003 URINALYSIS AUTO W/O SCOPE: CPT | Mod: ZL | Performed by: FAMILY MEDICINE

## 2018-06-20 PROCEDURE — 74018 RADEX ABDOMEN 1 VIEW: CPT | Mod: TC,FY

## 2018-06-20 RX ORDER — SIMETHICONE 125 MG
1-2 CAPSULE ORAL 2 TIMES DAILY PRN
Qty: 30 CAPSULE | Refills: 1 | Status: SHIPPED | OUTPATIENT
Start: 2018-06-20 | End: 2021-06-15

## 2018-06-20 NOTE — NURSING NOTE
"Chief Complaint   Patient presents with     Abdominal Pain     Other     Behaviors       Initial BP 92/62 (BP Location: Left arm, Patient Position: Sitting, Cuff Size: Adult Regular)  Pulse 82  Temp 96.9  F (36.1  C) (Tympanic)  Resp 14  Ht 4' 7.25\" (1.403 m)  Wt 115 lb (52.2 kg)  SpO2 98%  BMI 26.49 kg/m2 Estimated body mass index is 26.49 kg/(m^2) as calculated from the following:    Height as of this encounter: 4' 7.25\" (1.403 m).    Weight as of this encounter: 115 lb (52.2 kg).  Medication Reconciliation: complete    Marj Mcgill LPN    "
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
## 2018-06-20 NOTE — MR AVS SNAPSHOT
"              After Visit Summary   6/20/2018    Aaliyah Dean    MRN: [id number]           Patient Information     Date Of Birth          1970        Visit Information        Provider Department      6/20/2018 2:00 PM Clarissa Santos MD [facility]        Today's Diagnoses     Abdominal pain, generalized    -  1    Impulse control disorder           Follow-ups after your visit        Follow-up notes from your care team     Return if symptoms worsen or fail to improve.      Your next 10 appointments already scheduled     Jun 26, 2018  9:45 AM CDT   (Arrive by 9:30 AM)   SHORT with Zulma Chaudhry NP   [facility] ([facility] )    [address] [facility] [address] [address]   [phone]              Who to contact     If you have questions or need follow up information about today's clinic visit or your schedule please contact [facility] directly at [phone].  Normal or non-critical lab and imaging results will be communicated to you by MyChart, letter or phone within 4 business days after the clinic has received the results. If you do not hear from us within 7 days, please contact the clinic through MyChart or phone. If you have a critical or abnormal lab result, we will notify you by phone as soon as possible.  Submit refill requests through [employer] or call your pharmacy and they will forward the refill request to us. Please allow 3 business days for your refill to be completed.          Additional Information About Your Visit        Care EveryWhere ID     This is your Care EveryWhere ID. This could be used by other organizations to access your [place] medical records  [id number]        Your Vitals Were     Pulse Temperature Respirations Height Pulse Oximetry BMI (Body Mass Index)    82 96.9  F (36.1  C) (Tympanic) 14 4' 7.25\" (1.403 m) 98% 26.49 kg/m2       Blood Pressure from Last 3 Encounters:   06/20/18 92/62 "   06/11/18 100/62   05/18/18 106/58    Weight from Last 3 Encounters:   06/20/18 115 lb (52.2 kg)   06/11/18 116 lb 12.8 oz (53 kg)   05/18/18 116 lb (52.6 kg)              We Performed the Following     *UA reflex to Microscopic and Culture - [facility]     XR Abdomen 1 View          Today's Medication Changes          These changes are accurate as of 6/20/18  3:42 PM.  If you have any questions, ask your nurse or doctor.               Start taking these medicines.        Dose/Directions    Simethicone 125 MG Caps   Commonly known as:  GAS-X EXTRA STRENGTH   Used for:  Abdominal pain, generalized   Started by:  Clarissa Santos MD        Dose:  1-2 capsule   Take 1-2 capsules by mouth 2 times daily as needed   Quantity:  30 capsule   Refills:  1            Where to get your medicines      These medications were sent to [facility] - [place] - [address] [place] [address] [place] [address]     Phone:  [phone]     Simethicone 125 MG Caps                Primary Care Provider Office Phone # Fax #    Clarissa Santos [phone] [address] [address]        Equal Access to Services     CHUY AMAYA AH: Hadii aad ku hadasho Soomaali, waaxda luqadaha, qaybta kaalmada adeegyada, waxay siri mott. So [facility] [phone].    ATENCIÓN: Si habla español, tiene a paulino disposición servicios gratuitos de asistencia lingüística. Norberto al [phone].    We comply with applicable federal civil rights laws and [place] laws. We do not discriminate on the basis of race, color, national origin, age, disability, sex, sexual orientation, or gender identity.            Thank you!     Thank you for choosing [facility]  for your care. Our goal is always to provide you with excellent care. Hearing back from our patients is one way we can continue to improve our services. Please take a few minutes to  complete the written survey that you may receive in the mail after your visit with us. Thank you!             Your Updated Medication List - Protect others around you: Learn how to safely use, store and throw away your medicines at www.disposemymeds.org.          This list is accurate as of 6/20/18  3:42 PM.  Always use your most recent med list.                   Brand Name Dispense Instructions for use Diagnosis    ascorbic acid 500 MG tablet    VITAMIN C    100 tablet    TAKE ONE (1) TABLET BY MOUTH DAILY    Health care maintenance       Calcium-Vitamin D3 250-125 MG-UNIT Tabs     100 tablet    Take 1 tablet by mouth 2 times daily Take with meals    Health care maintenance       chlorhexidine 0.12 % solution    PERIDEX    473 mL    SWAB ON TEETH AND GUMS FOR 30 SECONDS TWICE A DAY    Health care maintenance       citalopram 10 MG tablet    celeXA    30 tablet    Take 1 tablet (10 mg) by mouth daily    Grief reaction       eucerin cream           ferrous sulfate 325 (65 Fe) MG tablet    IRON    100 tablet    Take one (1) tablet BY MOUTH twice daily    Healthcare maintenance       fish oil-omega-3 fatty acids 1000 MG capsule     100 capsule    TAKE ONE (1) CAPSULE BY MOUTH THREE TIMES DAILY    Healthcare maintenance       fluticasone 50 MCG/ACT spray    FLONASE    16 g    INSTILL 2 SPRAYS INTO BOTH NOSTRILS DAILY SHAKE GENTLY    Chronic sinusitis, unspecified location       guanFACINE 1 MG tablet    TENEX    45 tablet    Take 1/2 tablet BY MOUTH three times daily    Impulse control disease       ibuprofen 400 MG tablet    ADVIL/MOTRIN    120 tablet    Take 1 tablet by mouth every 4-6 hours as needed    Health care maintenance       INTROVALE 0.15-0.03 MG per tablet   Generic drug:  levonorgestrel-ethinyl estradiol     91 tablet    TAKE 1 TABLET BY MOUTH DAILY.    Down's syndrome       levothyroxine 150 MCG tablet    SYNTHROID/LEVOTHROID    30 tablet    TAKE ONE (1) TABLET BY MOUTH DAILY    Hypothyroidism        omeprazole 20 MG CR capsule    priLOSEC    90 capsule    Take one (1) capsule BY MOUTH daily    Dysphagia, unspecified type       risperiDONE 0.25 MG tablet    risperDAL    90 tablet    TAKE ONE (1) TABLET BY MOUTH THREE TIMES DAILY    Tourette's disorder       Simethicone 125 MG Caps    GAS-X EXTRA STRENGTH    30 capsule    Take 1-2 capsules by mouth 2 times daily as needed    Abdominal pain, generalized       simvastatin 10 MG tablet    ZOCOR    30 tablet    TAKE ONE (1) TABLET BY MOUTH DAILY    Hyperlipidemia LDL goal <100       triamcinolone 0.1 % cream    KENALOG    80 g    Apply sparingly to affected area twice daily as needed    Eczema, unspecified type

## 2018-06-25 ENCOUNTER — TELEPHONE (OUTPATIENT)
Dept: FAMILY MEDICINE | Facility: OTHER | Age: 48
End: 2018-06-25

## 2018-06-25 NOTE — TELEPHONE ENCOUNTER
9:21 AM    Reason for Call: Phone Call    Description: Franci called in with concerns over appointment for tomorrow, which is a follow up for medications.  Pt did not start the medication till last week Monday, so she feels it is too early for this appointment. Please advise.      Was an appointment offered for this call? No  If yes : Appointment type              Date    Preferred method for responding to this message: Telephone Call  What is your phone number ? [phone]    If we cannot reach you directly, may we leave a detailed response at the number you provided? Yes    Can this message wait until your PCP/provider returns, if available today? Not applicable, provider is in today.     Thank you,     Dionne Tavarez

## 2018-07-11 DIAGNOSIS — E03.9 HYPOTHYROIDISM: ICD-10-CM

## 2018-07-12 RX ORDER — LEVOTHYROXINE SODIUM 150 UG/1
TABLET ORAL
Qty: 30 TABLET | Refills: 0 | Status: SHIPPED | OUTPATIENT
Start: 2018-07-12 | End: 2018-09-10

## 2018-07-20 NOTE — PROGRESS NOTES
SUBJECTIVE:   Aaliyah Dean is a 47 year old female who presents to clinic today for the following health issues:        Medication Followup of  Celexa    Taking Medication as prescribed: yes    Side Effects:  Tremors to hands    Medication Helping Symptoms:  Behaviors are down from what they were.    Staff state she usually has these behaviors in the fall and winter and is unusual for her to exhibit them in the summer months.  Main staff was off for three weeks and that is when behaviors escalated.  Staff note she is doing much better since starting celexa.         Problem list and histories reviewed & adjusted, as indicated.  Additional history: as documented    Patient Active Problem List   Diagnosis     Down's syndrome     Hypothyroidism     Hyperlipidemia     Disturbance of conduct     Convulsions (H)     Dysphagia     Allergic rhinitis     Other acne     Impulse control disorder     ACP (advance care planning)     Past Surgical History:   Procedure Laterality Date     CHOLECYSTECTOMY  01/2011     ENT SURGERY       EXAM UNDER ANESTHESIA PELVIC N/A 5/4/2016    Procedure: EXAM UNDER ANESTHESIA PELVIC;  Surgeon: Valentin Ferris MD;  Location: [facility]     GENITOURINARY SURGERY      INTERNAL URETHROTOMY     HERNIA REPAIR  march 4 2015    ventral herniorrhaphy with intraperitoneal sepramesh     ileostomy take-down  2011     illeostomy  04/25/2011     ventilation tubes, bilateral         Social History   Substance Use Topics     Smoking status: Never Smoker     Smokeless tobacco: Never Used      Comment: no passive exposure     Alcohol use No     Family History   Problem Relation Age of Onset     Cancer Mother      cause of death     Diabetes No family hx of          Current Outpatient Prescriptions   Medication Sig Dispense Refill     ascorbic acid (VITAMIN C) 500 MG tablet TAKE ONE (1) TABLET BY MOUTH DAILY 100 tablet 2     Calcium Carb-Cholecalciferol (CALCIUM-VITAMIN D3) 250-125 MG-UNIT TABS Take 1 tablet by  mouth 2 times daily Take with meals 100 tablet 5     chlorhexidine (PERIDEX) 0.12 % solution SWAB ON TEETH AND GUMS FOR 30 SECONDS TWICE A  mL 0     citalopram (CELEXA) 10 MG tablet Take 1 tablet (10 mg) by mouth daily 30 tablet 0     ferrous sulfate (IRON) 325 (65 FE) MG tablet Take one (1) tablet BY MOUTH twice daily 100 tablet 2     fish oil-omega-3 fatty acids 1000 MG capsule TAKE ONE (1) CAPSULE BY MOUTH THREE TIMES DAILY 100 capsule 5     fluticasone (FLONASE) 50 MCG/ACT spray INSTILL 2 SPRAYS INTO BOTH NOSTRILS DAILY SHAKE GENTLY 16 g 10     guanFACINE (TENEX) 1 MG tablet Take 1/2 tablet BY MOUTH three times daily 45 tablet 5     ibuprofen (ADVIL,MOTRIN) 400 MG tablet Take 1 tablet by mouth every 4-6 hours as needed 120 tablet 2     INTROVALE 0.15-0.03 MG per tablet TAKE ONE (1) TABLET BY MOUTH DAILY QUASENSE/JOLESSA 91 tablet 1     levothyroxine (SYNTHROID/LEVOTHROID) 150 MCG tablet TAKE ONE (1) TABLET BY MOUTH DAILY 30 tablet 0     omeprazole (PRILOSEC) 20 MG CR capsule Take one (1) capsule BY MOUTH daily 90 capsule 2     risperiDONE (RISPERDAL) 0.25 MG tablet TAKE ONE (1) TABLET BY MOUTH THREE TIMES DAILY 90 tablet 2     Simethicone (GAS-X EXTRA STRENGTH) 125 MG CAPS Take 1-2 capsules by mouth 2 times daily as needed 30 capsule 1     simvastatin (ZOCOR) 10 MG tablet TAKE ONE (1) TABLET BY MOUTH DAILY 30 tablet 8     Skin Protectants, Misc. (EUCERIN) cream        triamcinolone (KENALOG) 0.1 % cream Apply sparingly to affected area twice daily as needed 80 g 1     Allergies   Allergen Reactions     Amoxicillin      Cephalexin Monohydrate      Keflex       Recent Labs   Lab Test  05/18/18   0915  08/22/17   0850  05/16/17   0924  10/07/16   1551  04/05/16   0942   LDL  43   --   37   --   64   HDL  43*   --   52   --   45*   TRIG  186*   --   202*   --   226*   ALT  34   --   22  23  40   CR  0.92   --   0.98  1.03  1.00   GFRESTIMATED  66   --   62  58*  60*   GFRESTBLACK  80   --   75  70  73  "  POTASSIUM  4.6   --   4.4  4.7  4.5   TSH  0.14*  3.46  4.60*   --   1.09      BP Readings from Last 3 Encounters:   07/23/18 98/60   06/20/18 92/62   06/11/18 100/62    Wt Readings from Last 3 Encounters:   07/23/18 114 lb 9.6 oz (52 kg)   06/20/18 115 lb (52.2 kg)   06/11/18 116 lb 12.8 oz (53 kg)                    Reviewed and updated as needed this visit by clinical staff       Reviewed and updated as needed this visit by Provider         ROS:  Constitutional, HEENT, cardiovascular, pulmonary, gi and gu systems are negative, except as otherwise noted.    OBJECTIVE:     BP 98/60  Pulse 65  Temp 97.1  F (36.2  C)  Ht 4' 7.25\" (1.403 m)  Wt 114 lb 9.6 oz (52 kg)  SpO2 97%  BMI 26.4 kg/m2  Body mass index is 26.4 kg/(m^2).  GENERAL: alert and no distress, cooperative  PSYCH: mentation stable, affect bright        ASSESSMENT/PLAN:     1. Impulse control disorder    2. Grief reaction  - citalopram (CELEXA) 10 MG tablet; Take 1 tablet (10 mg) by mouth daily  Dispense: 90 tablet; Refill: 1      FUTURE APPOINTMENTS:       - Follow-up visit in 3 months or as needed for acute concerns.     Zulma Chaudhry, NP  [facility]  "

## 2018-07-23 ENCOUNTER — OFFICE VISIT (OUTPATIENT)
Dept: FAMILY MEDICINE | Facility: OTHER | Age: 48
End: 2018-07-23
Attending: NURSE PRACTITIONER
Payer: MEDICARE

## 2018-07-23 VITALS
HEIGHT: 55 IN | SYSTOLIC BLOOD PRESSURE: 98 MMHG | TEMPERATURE: 97.1 F | BODY MASS INDEX: 26.52 KG/M2 | HEART RATE: 65 BPM | DIASTOLIC BLOOD PRESSURE: 60 MMHG | WEIGHT: 114.6 LBS | OXYGEN SATURATION: 97 %

## 2018-07-23 DIAGNOSIS — F43.21 GRIEF REACTION: ICD-10-CM

## 2018-07-23 DIAGNOSIS — F63.9 IMPULSE CONTROL DISORDER: Primary | ICD-10-CM

## 2018-07-23 PROCEDURE — G0463 HOSPITAL OUTPT CLINIC VISIT: HCPCS

## 2018-07-23 PROCEDURE — 99213 OFFICE O/P EST LOW 20 MIN: CPT | Performed by: NURSE PRACTITIONER

## 2018-07-23 RX ORDER — CITALOPRAM HYDROBROMIDE 10 MG/1
TABLET ORAL
Qty: 90 TABLET | Refills: 1 | Status: SHIPPED | OUTPATIENT
Start: 2018-07-23 | End: 2018-12-28

## 2018-07-23 NOTE — NURSING NOTE
"Chief Complaint   Patient presents with     Lipids     Thyroid Problem       Initial BP 98/60  Pulse 65  Temp 97.1  F (36.2  C)  Ht 4' 7.25\" (1.403 m)  Wt 114 lb 9.6 oz (52 kg)  SpO2 97%  BMI 26.4 kg/m2 Estimated body mass index is 26.4 kg/(m^2) as calculated from the following:    Height as of this encounter: 4' 7.25\" (1.403 m).    Weight as of this encounter: 114 lb 9.6 oz (52 kg).  Medication Reconciliation: alpesh Dejesus MA      Supervisor Christel helped answer questions for pt.  Anjelica HO  "

## 2018-07-23 NOTE — MR AVS SNAPSHOT
After Visit Summary   7/23/2018    Aaliyah Dean    MRN: [id number]           Patient Information     Date Of Birth          1970        Visit Information        Provider Department      7/23/2018 9:45 AM Zulma Chaudhry NP [facility]        Today's Diagnoses     Impulse control disorder    -  1    Grief reaction          Care Instructions      ASSESSMENT/PLAN:     1. Impulse control disorder    2. Grief reaction  - citalopram (CELEXA) 10 MG tablet; Take 1 tablet (10 mg) by mouth daily  Dispense: 90 tablet; Refill: 1      FUTURE APPOINTMENTS:       - Follow-up visit in 3 months or as needed for acute concerns.     Zulma Chaudhry NP  [facility]          Follow-ups after your visit        Follow-up notes from your care team     Return in about 3 months (around 10/23/2018), or if symptoms worsen or fail to improve.      Your next 10 appointments already scheduled     Jul 26, 2018 10:00 AM CDT   (Arrive by 9:45 AM)   New Visit with Joesph Carlton MD   [facility] ([facility] )    [address] [facility] [address]   [phone]              Who to contact     If you have questions or need follow up information about today's clinic visit or your schedule please contact [facility] directly at [phone].  Normal or non-critical lab and imaging results will be communicated to you by MyChart, letter or phone within 4 business days after the clinic has received the results. If you do not hear from us within 7 days, please contact the clinic through MyChart or phone. If you have a critical or abnormal lab result, we will notify you by phone as soon as possible.  Submit refill requests through [employer] or call your pharmacy and they will forward the refill request to us. Please allow 3 business days for your refill to be completed.          Additional Information About Your Visit       "  Care EveryWhere ID     This is your Care EveryWhere ID. This could be used by other organizations to access your [place] medical records  [id number]        Your Vitals Were     Pulse Temperature Height Pulse Oximetry BMI (Body Mass Index)       65 97.1  F (36.2  C) 4' 7.25\" (1.403 m) 97% 26.4 kg/m2        Blood Pressure from Last 3 Encounters:   07/23/18 98/60   06/20/18 92/62   06/11/18 100/62    Weight from Last 3 Encounters:   07/23/18 114 lb 9.6 oz (52 kg)   06/20/18 115 lb (52.2 kg)   06/11/18 116 lb 12.8 oz (53 kg)              Today, you had the following     No orders found for display         Where to get your medicines      These medications were sent to [facility]- [place], [place] - [address] [place] [address]     Phone:  [phone]     citalopram 10 MG tablet          Primary Care Provider Office Phone # Fax #    Clarissa Santos [phone] [address] [facility] [address]        Equal Access to Services     CHUY AMAYA : Hadii lina winno Soblasali, waaxda luqadaha, qaybta kaalmada adeegyada, helen mott. So [facility] [phone].    ATENCIÓN: Si habla español, tiene a paulino disposición servicios gratuitos de asistencia lingüística. Llame al [phone].    We comply with applicable federal civil rights laws and [place] laws. We do not discriminate on the basis of race, color, national origin, age, disability, sex, sexual orientation, or gender identity.            Thank you!     Thank you for choosing [facility]  for your care. Our goal is always to provide you with excellent care. Hearing back from our patients is one way we can continue to improve our services. Please take a few minutes to complete the written survey that you may receive in the mail after your visit with us. Thank you!             Your Updated Medication List - Protect others around you: Learn " how to safely use, store and throw away your medicines at www.disposemymeds.org.          This list is accurate as of 7/23/18  9:53 AM.  Always use your most recent med list.                   Brand Name Dispense Instructions for use Diagnosis    ascorbic acid 500 MG tablet    VITAMIN C    100 tablet    TAKE ONE (1) TABLET BY MOUTH DAILY    Health care maintenance       Calcium-Vitamin D3 250-125 MG-UNIT Tabs     100 tablet    Take 1 tablet by mouth 2 times daily Take with meals    Health care maintenance       chlorhexidine 0.12 % solution    PERIDEX    473 mL    SWAB ON TEETH AND GUMS FOR 30 SECONDS TWICE A DAY    Health care maintenance       citalopram 10 MG tablet    celeXA    90 tablet    Take 1 tablet (10 mg) by mouth daily    Grief reaction       eucerin cream           ferrous sulfate 325 (65 Fe) MG tablet    IRON    100 tablet    Take one (1) tablet BY MOUTH twice daily    Healthcare maintenance       fish oil-omega-3 fatty acids 1000 MG capsule     100 capsule    TAKE ONE (1) CAPSULE BY MOUTH THREE TIMES DAILY    Healthcare maintenance       fluticasone 50 MCG/ACT spray    FLONASE    16 g    INSTILL 2 SPRAYS INTO BOTH NOSTRILS DAILY SHAKE GENTLY    Chronic sinusitis, unspecified location       guanFACINE 1 MG tablet    TENEX    45 tablet    Take 1/2 tablet BY MOUTH three times daily    Impulse control disease       ibuprofen 400 MG tablet    ADVIL/MOTRIN    120 tablet    Take 1 tablet by mouth every 4-6 hours as needed    Health care maintenance       INTROVALE 0.15-0.03 MG per tablet   Generic drug:  levonorgestrel-ethinyl estradiol     91 tablet    TAKE ONE (1) TABLET BY MOUTH DAILY QUASENSE/JOLESSA    Down's syndrome       levothyroxine 150 MCG tablet    SYNTHROID/LEVOTHROID    30 tablet    TAKE ONE (1) TABLET BY MOUTH DAILY    Hypothyroidism       omeprazole 20 MG CR capsule    priLOSEC    90 capsule    Take one (1) capsule BY MOUTH daily    Dysphagia, unspecified type       risperiDONE 0.25 MG tablet     risperDAL    90 tablet    TAKE ONE (1) TABLET BY MOUTH THREE TIMES DAILY    Tourette's disorder       Simethicone 125 MG Caps    GAS-X EXTRA STRENGTH    30 capsule    Take 1-2 capsules by mouth 2 times daily as needed    Abdominal pain, generalized       simvastatin 10 MG tablet    ZOCOR    30 tablet    TAKE ONE (1) TABLET BY MOUTH DAILY    Hyperlipidemia LDL goal <100       triamcinolone 0.1 % cream    KENALOG    80 g    Apply sparingly to affected area twice daily as needed    Eczema, unspecified type

## 2018-07-23 NOTE — PATIENT INSTRUCTIONS
ASSESSMENT/PLAN:     1. Impulse control disorder    2. Grief reaction  - citalopram (CELEXA) 10 MG tablet; Take 1 tablet (10 mg) by mouth daily  Dispense: 90 tablet; Refill: 1      FUTURE APPOINTMENTS:       - Follow-up visit in 3 months or as needed for acute concerns.     Zulma Chaudhry, NP  [facility]

## 2018-07-30 DIAGNOSIS — F63.9 IMPULSE CONTROL DISEASE: ICD-10-CM

## 2018-07-31 RX ORDER — GUANFACINE 1 MG/1
0.5 TABLET ORAL 3 TIMES DAILY
Qty: 45 TABLET | Refills: 11 | Status: SHIPPED | OUTPATIENT
Start: 2018-07-31 | End: 2019-08-09

## 2018-07-31 NOTE — TELEPHONE ENCOUNTER
Tenex       Last Written Prescription Date:  2/22/2018  Last Fill Quantity: 45,   # refills: 5  Last Office Visit: 7/23/2018  Future Office visit:

## 2018-08-06 DIAGNOSIS — Z00.00 HEALTH CARE MAINTENANCE: ICD-10-CM

## 2018-08-07 NOTE — TELEPHONE ENCOUNTER
chlorhexidine (PERIDEX) 0.12 % solution    Last Written Prescription Date:  5/15/18  Last Fill Quantity: 473ml,   # refills: 0  Last Office Visit: 5/18/18  Future Office visit:

## 2018-08-08 RX ORDER — CHLORHEXIDINE GLUCONATE ORAL RINSE 1.2 MG/ML
SOLUTION DENTAL
Qty: 473 ML | Refills: 1 | Status: SHIPPED | OUTPATIENT
Start: 2018-08-08 | End: 2018-10-22

## 2018-09-10 DIAGNOSIS — E03.9 HYPOTHYROIDISM: ICD-10-CM

## 2018-09-10 RX ORDER — LEVOTHYROXINE SODIUM 150 UG/1
TABLET ORAL
Qty: 30 TABLET | Refills: 0 | Status: SHIPPED | OUTPATIENT
Start: 2018-09-10 | End: 2018-11-08

## 2018-10-01 DIAGNOSIS — H69.93 DYSFUNCTION OF BOTH EUSTACHIAN TUBES: Primary | ICD-10-CM

## 2018-10-01 NOTE — TELEPHONE ENCOUNTER
loratadine      Last Written Prescription Date:  Not on medication list  Last Fill Quantity: ,   # refills:   Last Office Visit: 7/23/18

## 2018-10-03 RX ORDER — LORATADINE 10 MG/1
10 TABLET ORAL DAILY
Qty: 30 TABLET | Refills: 10 | Status: SHIPPED | OUTPATIENT
Start: 2018-10-03

## 2018-10-03 NOTE — TELEPHONE ENCOUNTER
Please see note below for Loratadine.  Not on current medication list.  Medication discontinued on 6/20/18 for reason therapy completed.  Please advise.  Thank you

## 2018-10-22 DIAGNOSIS — Z00.00 HEALTH CARE MAINTENANCE: ICD-10-CM

## 2018-10-22 RX ORDER — CHLORHEXIDINE GLUCONATE ORAL RINSE 1.2 MG/ML
SOLUTION DENTAL
Qty: 473 ML | Refills: 0 | Status: SHIPPED | OUTPATIENT
Start: 2018-10-22 | End: 2019-02-11

## 2018-10-22 NOTE — TELEPHONE ENCOUNTER
Chlorhexidine       Last Written Prescription Date:  8/8/18  Last Fill Quantity: 473mL,   # refills: 1  Last Office Visit: 7/23/18  Future Office visit:    Next 5 appointments (look out 90 days)     Nov 19, 2018  9:45 AM CST   (Arrive by 9:30 AM)   SHORT with Clarissa Santos MD   [facility] ([facility] )    [address]  [facility] [address]   [phone]

## 2018-11-08 DIAGNOSIS — E03.9 HYPOTHYROIDISM: ICD-10-CM

## 2018-11-08 DIAGNOSIS — E03.9 HYPOTHYROIDISM, UNSPECIFIED TYPE: Primary | ICD-10-CM

## 2018-11-08 DIAGNOSIS — Z00.00 HEALTHCARE MAINTENANCE: ICD-10-CM

## 2018-11-08 RX ORDER — CHLORAL HYDRATE 500 MG
CAPSULE ORAL
Qty: 100 CAPSULE | Refills: 0 | Status: SHIPPED | OUTPATIENT
Start: 2018-11-08 | End: 2018-12-17

## 2018-11-08 RX ORDER — LEVOTHYROXINE SODIUM 150 UG/1
TABLET ORAL
Qty: 30 TABLET | Refills: 0 | Status: SHIPPED | OUTPATIENT
Start: 2018-11-08 | End: 2018-12-11

## 2018-11-08 NOTE — TELEPHONE ENCOUNTER
LEVOTHYROXINE SODIUM 150 MCG TABLET      Last Written Prescription Date:  9-  Last Fill Quantity: 30,   # refills: 0  Last Office Visit: 7-  Future Office visit:    Next 5 appointments (look out 90 days)     Nov 19, 2018  9:45 AM CST   (Arrive by 9:30 AM)   SHORT with Clarissa Santos MD   [facility] ([facility] )    [address]  [place] [address]   [phone]                 FISH -1000 MG CAPSULE      Last Written Prescription Date:  5-  Last Fill Quantity: 100,   # refills: 5  Last Office Visit: 7-  Future Office visit:    Next 5 appointments (look out 90 days)     Nov 19, 2018  9:45 AM CST   (Arrive by 9:30 AM)   SHORT with Clarissa Santos MD   [facility]    [address] [place] [address]   [phone]

## 2018-11-08 NOTE — TELEPHONE ENCOUNTER
Last TSH on 5.18.18 was recommended to recheck in 3 months.Syntrhoid last filled on 9.10.18 #30. Last office visit on 7.23.18.Pended.Thank you.

## 2018-11-13 NOTE — PROGRESS NOTES
SUBJECTIVE:                                                    Aaliyah Dean is a 48 year old female who presents to clinic today for the following health issues:      Hyperlipidemia Follow-Up      Rate your low fat/cholesterol diet?: good    Taking statin?  Yes, no muscle aches from statin    Other lipid medications/supplements?:  none    Hypothyroidism Follow-up      Since last visit, patient describes the following symptoms: Weight stable, no hair loss, no skin changes, no constipation, no loose stools      Amount of exercise or physical activity: 4-5 days/week for an average of 30-45 minutes    Problems taking medications regularly: No    Medication side effects: none    Diet: low fat/cholesterol        Problem list and histories reviewed & adjusted, as indicated.  Additional history: as documented    Patient Active Problem List   Diagnosis     Down's syndrome     Hypothyroidism     Hyperlipidemia     Disturbance of conduct     Convulsions (H)     Dysphagia     Allergic rhinitis     Other acne     Impulse control disorder     ACP (advance care planning)     Past Surgical History:   Procedure Laterality Date     CHOLECYSTECTOMY  01/2011     ENT SURGERY       EXAM UNDER ANESTHESIA PELVIC N/A 5/4/2016    Procedure: EXAM UNDER ANESTHESIA PELVIC;  Surgeon: Valentin Ferris MD;  Location: [facility]     GENITOURINARY SURGERY      INTERNAL URETHROTOMY     HERNIA REPAIR  march 4 2015    ventral herniorrhaphy with intraperitoneal sepramesh     ileostomy take-down  2011     illeostomy  04/25/2011     ventilation tubes, bilateral         Social History   Substance Use Topics     Smoking status: Never Smoker     Smokeless tobacco: Never Used      Comment: no passive exposure     Alcohol use No     Family History   Problem Relation Age of Onset     Cancer Mother      cause of death     Diabetes No family hx of          Current Outpatient Prescriptions   Medication Sig Dispense Refill     ascorbic acid (VITAMIN C) 500 MG  tablet TAKE ONE (1) TABLET BY MOUTH DAILY 100 tablet 2     Calcium Carb-Cholecalciferol (CALCIUM-VITAMIN D3) 250-125 MG-UNIT TABS TAKE ONE (1) TABLET BY MOUTH TWICE DAILY WITH MEALS 100 tablet 5     chlorhexidine (PERIDEX) 0.12 % solution SWAB ON TEETH AND GUMS FOR 30 SECONDS TWICE A  mL 0     citalopram (CELEXA) 10 MG tablet Take 1 tablet (10 mg) by mouth daily 90 tablet 1     ferrous sulfate (IRON) 325 (65 FE) MG tablet Take one (1) tablet BY MOUTH twice daily 100 tablet 2     fish oil-omega-3 fatty acids 1000 MG capsule TAKE ONE CAPSULE BY MOUTH THREE TIMES DAILY 100 capsule 0     fluticasone (FLONASE) 50 MCG/ACT spray INSTILL 2 SPRAYS INTO BOTH NOSTRILS DAILY SHAKE GENTLY 16 g 10     ibuprofen (ADVIL,MOTRIN) 400 MG tablet Take 1 tablet by mouth every 4-6 hours as needed 120 tablet 2     INTROVALE 0.15-0.03 MG per tablet TAKE ONE (1) TABLET BY MOUTH DAILY QUASENSE/JOLESSA 91 tablet 1     levothyroxine (SYNTHROID/LEVOTHROID) 150 MCG tablet TAKE ONE (1) TABLET BY MOUTH DAILY 30 tablet 0     loratadine (CLARITIN) 10 MG tablet Take 1 tablet (10 mg) by mouth daily 30 tablet 10     omeprazole (PRILOSEC) 20 MG CR capsule TAKE ONE (1) CAPSULE BY MOUTH DAILY 90 capsule 0     risperiDONE (RISPERDAL) 0.25 MG tablet TAKE ONE (1) TABLET BY MOUTH THREE TIMES DAILY 90 tablet 0     Simethicone (GAS-X EXTRA STRENGTH) 125 MG CAPS Take 1-2 capsules by mouth 2 times daily as needed 30 capsule 1     simvastatin (ZOCOR) 10 MG tablet TAKE ONE (1) TABLET BY MOUTH DAILY 30 tablet 8     Skin Protectants, Misc. (EUCERIN) cream        triamcinolone (KENALOG) 0.1 % cream Apply sparingly to affected area twice daily as needed 80 g 1     guanFACINE (TENEX) 1 MG tablet Take 0.5 tablets (0.5 mg) by mouth 3 times daily 45 tablet 11     [DISCONTINUED] Ferrous Sulfate 324 (65 Fe) MG TBEC TAKE ONE (1) TABLET BY MOUTH TWICE DAILY 100 tablet 2     Allergies   Allergen Reactions     Amoxicillin      Cephalexin Monohydrate      Keflex    "      ROS:  Constitutional, HEENT, cardiovascular, pulmonary, gi and gu systems are negative, except as otherwise noted.    OBJECTIVE:     /62 (BP Location: Left arm, Patient Position: Sitting, Cuff Size: Adult Regular)  Pulse 67  Temp 97.4  F (36.3  C) (Tympanic)  Resp 16  Ht 4' 7.25\" (1.403 m)  Wt 116 lb 6.4 oz (52.8 kg)  SpO2 97%  BMI 26.81 kg/m2  Body mass index is 26.81 kg/(m^2).  GENERAL: healthy, alert and no distress  PSYCH: mentation appears normal, affect normal/bright    Diagnostic Test Results:  none     ASSESSMENT/PLAN:     Hyperlipidemia; controlled   Plan:  Labs:   Lipid and ALT    Hypothyroidism; controlled/euthyroid   Plan:  Labs:  TSH and Free T4          ICD-10-CM    1. Hyperlipidemia, unspecified hyperlipidemia type E78.5 Comprehensive metabolic panel     Lipid Profile     T4 free     T4 free   2. Acquired hypothyroidism E03.9 TSH with free T4 reflex   3. Convulsions, unspecified convulsion type (H) R56.9 Comprehensive metabolic panel       FUTURE APPOINTMENTS:       - Follow-up for annual visit or as needed    Clarissa Santos MD  [facility]                 "

## 2018-11-15 DIAGNOSIS — F95.2 TOURETTE'S DISORDER: ICD-10-CM

## 2018-11-15 RX ORDER — RISPERIDONE 0.25 MG/1
TABLET ORAL
Qty: 90 TABLET | Refills: 0 | Status: SHIPPED | OUTPATIENT
Start: 2018-11-15 | End: 2019-01-14

## 2018-11-19 ENCOUNTER — OFFICE VISIT (OUTPATIENT)
Dept: FAMILY MEDICINE | Facility: OTHER | Age: 48
End: 2018-11-19
Attending: FAMILY MEDICINE
Payer: MEDICARE

## 2018-11-19 VITALS
HEART RATE: 67 BPM | BODY MASS INDEX: 26.94 KG/M2 | TEMPERATURE: 97.4 F | DIASTOLIC BLOOD PRESSURE: 62 MMHG | RESPIRATION RATE: 16 BRPM | OXYGEN SATURATION: 97 % | SYSTOLIC BLOOD PRESSURE: 102 MMHG | HEIGHT: 55 IN | WEIGHT: 116.4 LBS

## 2018-11-19 DIAGNOSIS — E78.5 HYPERLIPIDEMIA, UNSPECIFIED HYPERLIPIDEMIA TYPE: Primary | ICD-10-CM

## 2018-11-19 DIAGNOSIS — R56.9 CONVULSIONS, UNSPECIFIED CONVULSION TYPE (H): ICD-10-CM

## 2018-11-19 DIAGNOSIS — E03.9 ACQUIRED HYPOTHYROIDISM: ICD-10-CM

## 2018-11-19 LAB
ALBUMIN SERPL-MCNC: 3.1 G/DL (ref 3.4–5)
ALP SERPL-CCNC: 86 U/L (ref 40–150)
ALT SERPL W P-5'-P-CCNC: 21 U/L (ref 0–50)
ANION GAP SERPL CALCULATED.3IONS-SCNC: 5 MMOL/L (ref 3–14)
AST SERPL W P-5'-P-CCNC: 18 U/L (ref 0–45)
BILIRUB SERPL-MCNC: 0.3 MG/DL (ref 0.2–1.3)
BUN SERPL-MCNC: 15 MG/DL (ref 7–30)
CALCIUM SERPL-MCNC: 8.4 MG/DL (ref 8.5–10.1)
CHLORIDE SERPL-SCNC: 106 MMOL/L (ref 94–109)
CHOLEST SERPL-MCNC: 117 MG/DL
CO2 SERPL-SCNC: 26 MMOL/L (ref 20–32)
CREAT SERPL-MCNC: 0.94 MG/DL (ref 0.52–1.04)
GFR SERPL CREATININE-BSD FRML MDRD: 64 ML/MIN/1.7M2
GLUCOSE SERPL-MCNC: 88 MG/DL (ref 70–99)
HDLC SERPL-MCNC: 43 MG/DL
LDLC SERPL CALC-MCNC: 33 MG/DL
NONHDLC SERPL-MCNC: 74 MG/DL
POTASSIUM SERPL-SCNC: 4.1 MMOL/L (ref 3.4–5.3)
PROT SERPL-MCNC: 6.4 G/DL (ref 6.8–8.8)
SODIUM SERPL-SCNC: 137 MMOL/L (ref 133–144)
T4 FREE SERPL-MCNC: 0.76 NG/DL (ref 0.76–1.46)
TRIGL SERPL-MCNC: 203 MG/DL
TSH SERPL DL<=0.005 MIU/L-ACNC: 16.08 MU/L (ref 0.4–4)

## 2018-11-19 PROCEDURE — 36415 COLL VENOUS BLD VENIPUNCTURE: CPT | Mod: ZL | Performed by: FAMILY MEDICINE

## 2018-11-19 PROCEDURE — 84439 ASSAY OF FREE THYROXINE: CPT | Mod: ZL | Performed by: FAMILY MEDICINE

## 2018-11-19 PROCEDURE — 80061 LIPID PANEL: CPT | Mod: ZL | Performed by: FAMILY MEDICINE

## 2018-11-19 PROCEDURE — 80053 COMPREHEN METABOLIC PANEL: CPT | Mod: ZL | Performed by: FAMILY MEDICINE

## 2018-11-19 PROCEDURE — 99213 OFFICE O/P EST LOW 20 MIN: CPT | Performed by: FAMILY MEDICINE

## 2018-11-19 PROCEDURE — G0463 HOSPITAL OUTPT CLINIC VISIT: HCPCS

## 2018-11-19 PROCEDURE — 84443 ASSAY THYROID STIM HORMONE: CPT | Mod: ZL | Performed by: FAMILY MEDICINE

## 2018-11-19 ASSESSMENT — PAIN SCALES - GENERAL: PAINLEVEL: NO PAIN (0)

## 2018-11-19 NOTE — NURSING NOTE
"No chief complaint on file.      Initial /62 (BP Location: Left arm, Patient Position: Sitting, Cuff Size: Adult Regular)  Pulse 67  Temp 97.4  F (36.3  C) (Tympanic)  Resp 16  Ht 4' 7.25\" (1.403 m)  Wt 116 lb 6.4 oz (52.8 kg)  SpO2 97%  BMI 26.81 kg/m2 Estimated body mass index is 26.81 kg/(m^2) as calculated from the following:    Height as of this encounter: 4' 7.25\" (1.403 m).    Weight as of this encounter: 116 lb 6.4 oz (52.8 kg).  Medication Reconciliation: complete    Dahlia Javed MA    "

## 2018-11-19 NOTE — LETTER
My Depression Action Plan  Name: Aaliyah Dean   Date of Birth 1970  Date: 11/13/2018    My doctor: Clarissa Santos   My clinic: [facility]  [address]  [place] [address]  [phone]          GREEN    ZONE   Good Control    What it looks like:     Things are going generally well. You have normal up s and down s. You may even feel depressed from time to time, but bad moods usually last less than a day.   What you need to do:  1. Continue to care for yourself (see self care plan)  2. Check your depression survival kit and update it as needed  3. Follow your physician s recommendations including any medication.  4. Do not stop taking medication unless you consult with your physician first.           YELLOW         ZONE Getting Worse    What it looks like:     Depression is starting to interfere with your life.     It may be hard to get out of bed; you may be starting to isolate yourself from others.    Symptoms of depression are starting to last most all day and this has happened for several days.     You may have suicidal thoughts but they are not constant.   What you need to do:     1. Call your care team, your response to treatment will improve if you keep your care team informed of your progress. Yellow periods are signs an adjustment may need to be made.     2. Continue your self-care, even if you have to fake it!    3. Talk to someone in your support network    4. Open up your depression survival kit           RED    ZONE Medical Alert - Get Help    What it looks like:     Depression is seriously interfering with your life.     You may experience these or other symptoms: You can t get out of bed most days, can t work or engage in other necessary activities, you have trouble taking care of basic hygiene, or basic responsibilities, thoughts of suicide or death that will not go away, self-injurious behavior.     What you need to do:  1. Call your  care team and request a same-day appointment. If they are not available (weekends or after hours) call your local crisis line, emergency room or 911.            Depression Self Care Plan / Survival Kit    Self-Care for Depression  Here s the deal. Your body and mind are really not as separate as most people think.  What you do and think affects how you feel and how you feel influences what you do and think. This means if you do things that people who feel good do, it will help you feel better.  Sometimes this is all it takes.  There is also a place for medication and therapy depending on how severe your depression is, so be sure to consult with your medical provider and/ or Behavioral Health Consultant if your symptoms are worsening or not improving.     In order to better manage my stress, I will:    Exercise  Get some form of exercise, every day. This will help reduce pain and release endorphins, the  feel good  chemicals in your brain. This is almost as good as taking antidepressants!  This is not the same as joining a gym and then never going! (they count on that by the way ) It can be as simple as just going for a walk or doing some gardening, anything that will get you moving.      Hygiene   Maintain good hygiene (Get out of bed in the morning, Make your bed, Brush your teeth, Take a shower, and Get dressed like you were going to work, even if you are unemployed).  If your clothes don't fit try to get ones that do.    Diet  I will strive to eat foods that are good for me, drink plenty of water, and avoid excessive sugar, caffeine, alcohol, and other mood-altering substances.  Some foods that are helpful in depression are: complex carbohydrates, B vitamins, flaxseed, fish or fish oil, fresh fruits and vegetables.    Psychotherapy  I agree to participate in Individual Therapy (if recommended).    Medication  If prescribed medications, I agree to take them.  Missing doses can result in serious side effects.  I  understand that drinking alcohol, or other illicit drug use, may cause potential side effects.  I will not stop my medication abruptly without first discussing it with my provider.    Staying Connected With Others  I will stay in touch with my friends, family members, and my primary care provider/team.    Use your imagination  Be creative.  We all have a creative side; it doesn t matter if it s oil painting, sand castles, or mud pies! This will also kick up the endorphins.    Witness Beauty  (AKA stop and smell the roses) Take a look outside, even in mid-winter. Notice colors, textures. Watch the squirrels and birds.     Service to others  Be of service to others.  There is always someone else in need.  By helping others we can  get out of ourselves  and remember the really important things.  This also provides opportunities for practicing all the other parts of the program.    Humor  Laugh and be silly!  Adjust your TV habits for less news and crime-drama and more comedy.    Control your stress  Try breathing deep, massage therapy, biofeedback, and meditation. Find time to relax each day.     My support system    Clinic Contact:  Phone number:    Contact 1:  Phone number:    Contact 2:  Phone number:    Oriental orthodox/:  Phone number:    Therapist:  Phone number:    Local crisis center:    Phone number:    Other community support:  Phone number:

## 2018-11-19 NOTE — MR AVS SNAPSHOT
"              After Visit Summary   11/19/2018    Aaliyah Dean    MRN: [id number]           Patient Information     Date Of Birth          1970        Visit Information        Provider Department      11/19/2018 9:45 AM Clarissa Santos MD [facility]        Today's Diagnoses     Hyperlipidemia, unspecified hyperlipidemia type    -  1    Acquired hypothyroidism        Convulsions, unspecified convulsion type (H)           Follow-ups after your visit        Follow-up notes from your care team     Return in about 6 months (around 5/19/2019).      Who to contact     If you have questions or need follow up information about today's clinic visit or your schedule please contact [facility] directly at [phone].  Normal or non-critical lab and imaging results will be communicated to you by MyChart, letter or phone within 4 business days after the clinic has received the results. If you do not hear from us within 7 days, please contact the clinic through MyChart or phone. If you have a critical or abnormal lab result, we will notify you by phone as soon as possible.  Submit refill requests through [address] or call your pharmacy and they will forward the refill request to us. Please allow 3 business days for your refill to be completed.          Additional Information About Your Visit        Care EveryWhere ID     This is your Care EveryWhere ID. This could be used by other organizations to access your [place] medical records  [id number]        Your Vitals Were     Pulse Temperature Respirations Height Pulse Oximetry BMI (Body Mass Index)    67 97.4  F (36.3  C) (Tympanic) 16 4' 7.25\" (1.403 m) 97% 26.81 kg/m2       Blood Pressure from Last 3 Encounters:   11/19/18 102/62   07/23/18 98/60   06/20/18 92/62    Weight from Last 3 Encounters:   11/19/18 116 lb 6.4 oz (52.8 kg)   07/23/18 114 lb 9.6 oz (52 kg)   06/20/18 115 lb (52.2 kg)              We Performed " the Following     Comprehensive metabolic panel     Lipid Profile     T4 free     T4 free     TSH with free T4 reflex        Primary Care Provider Office Phone # Fax #    Clarissa Santos [phone] [address] [facility] [address]        Equal Access to Services     ARGELIAENDER MONIQUE : Hadii aad ku hadhomeroolga Sosamantha, waaxda luqadaha, qaybta kaalmada adeegyada, helen thorntontarynfarheen mott. So [facility] [phone].    ATENCIÓN: Si habla español, tiene a paulino disposición servicios gratuitos de asistencia lingüística. Llame al [phone].    We comply with applicable federal civil rights laws and [place] laws. We do not discriminate on the basis of race, color, national origin, age, disability, sex, sexual orientation, or gender identity.            Thank you!     Thank you for choosing [facility]  for your care. Our goal is always to provide you with excellent care. Hearing back from our patients is one way we can continue to improve our services. Please take a few minutes to complete the written survey that you may receive in the mail after your visit with us. Thank you!             Your Updated Medication List - Protect others around you: Learn how to safely use, store and throw away your medicines at www.disposemymeds.org.          This list is accurate as of 11/19/18  3:28 PM.  Always use your most recent med list.                   Brand Name Dispense Instructions for use Diagnosis    ascorbic acid 500 MG tablet    VITAMIN C    100 tablet    TAKE ONE (1) TABLET BY MOUTH DAILY    Health care maintenance       Calcium-Vitamin D3 250-125 MG-UNIT Tabs     100 tablet    TAKE ONE (1) TABLET BY MOUTH TWICE DAILY WITH MEALS    Health care maintenance       chlorhexidine 0.12 % solution    PERIDEX    473 mL    SWAB ON TEETH AND GUMS FOR 30 SECONDS TWICE A DAY    Health care maintenance       citalopram 10 MG tablet    celeXA    90 tablet    Take 1  tablet (10 mg) by mouth daily    Grief reaction       eucerin cream           ferrous sulfate 325 (65 Fe) MG tablet    IRON    100 tablet    Take one (1) tablet BY MOUTH twice daily    Healthcare maintenance       fish oil-omega-3 fatty acids 1000 MG capsule     100 capsule    TAKE ONE CAPSULE BY MOUTH THREE TIMES DAILY    Healthcare maintenance       fluticasone 50 MCG/ACT spray    FLONASE    16 g    INSTILL 2 SPRAYS INTO BOTH NOSTRILS DAILY SHAKE GENTLY    Chronic sinusitis, unspecified location       guanFACINE 1 MG tablet    TENEX    45 tablet    Take 0.5 tablets (0.5 mg) by mouth 3 times daily    Impulse control disease       ibuprofen 400 MG tablet    ADVIL/MOTRIN    120 tablet    Take 1 tablet by mouth every 4-6 hours as needed    Health care maintenance       INTROVALE 0.15-0.03 MG tablet   Generic drug:  levonorgestrel-ethinyl estradiol     91 tablet    TAKE ONE (1) TABLET BY MOUTH DAILY QUASENSE/JOLESSA    Down's syndrome       levothyroxine 150 MCG tablet    SYNTHROID/LEVOTHROID    30 tablet    TAKE ONE (1) TABLET BY MOUTH DAILY    Hypothyroidism, unspecified type       loratadine 10 MG tablet    CLARITIN    30 tablet    Take 1 tablet (10 mg) by mouth daily    Dysfunction of both eustachian tubes       omeprazole 20 MG CR capsule    priLOSEC    90 capsule    TAKE ONE (1) CAPSULE BY MOUTH DAILY    Dysphagia, unspecified type       risperiDONE 0.25 MG tablet    risperDAL    90 tablet    TAKE ONE (1) TABLET BY MOUTH THREE TIMES DAILY    Tourette's disorder       Simethicone 125 MG Caps    GAS-X EXTRA STRENGTH    30 capsule    Take 1-2 capsules by mouth 2 times daily as needed    Abdominal pain, generalized       simvastatin 10 MG tablet    ZOCOR    30 tablet    TAKE ONE (1) TABLET BY MOUTH DAILY    Hyperlipidemia LDL goal <100       triamcinolone 0.1 % cream    KENALOG    80 g    Apply sparingly to affected area twice daily as needed    Eczema, unspecified type

## 2018-11-20 DIAGNOSIS — Z00.00 HEALTHCARE MAINTENANCE: ICD-10-CM

## 2018-11-20 RX ORDER — FERROUS SULFATE 325(65) MG
TABLET ORAL
Qty: 100 TABLET | Refills: 0 | Status: SHIPPED | OUTPATIENT
Start: 2018-11-20 | End: 2019-01-14

## 2018-12-11 DIAGNOSIS — E03.9 HYPOTHYROIDISM, UNSPECIFIED TYPE: ICD-10-CM

## 2018-12-12 RX ORDER — LEVOTHYROXINE SODIUM 150 UG/1
TABLET ORAL
Qty: 30 TABLET | Refills: 1 | Status: SHIPPED | OUTPATIENT
Start: 2018-12-12 | End: 2018-12-13

## 2018-12-12 NOTE — TELEPHONE ENCOUNTER
Levothyroxine      Last Written Prescription Date:  11/8/18  Last Fill Quantity: 30,   # refills: 0  Last Office Visit: 11/19/18

## 2018-12-13 ENCOUNTER — TELEPHONE (OUTPATIENT)
Dept: FAMILY MEDICINE | Facility: OTHER | Age: 48
End: 2018-12-13

## 2018-12-13 DIAGNOSIS — E03.9 HYPOTHYROIDISM, UNSPECIFIED TYPE: ICD-10-CM

## 2018-12-13 RX ORDER — LEVOTHYROXINE SODIUM 175 UG/1
175 TABLET ORAL DAILY
Qty: 30 TABLET | Refills: 2 | Status: SHIPPED | OUTPATIENT
Start: 2018-12-13 | End: 2019-01-31

## 2018-12-13 NOTE — TELEPHONE ENCOUNTER
Please send the new prescription to the pharmacy for her thyroid.  [facility] in [place].  Thanks.  Pt only has 7 left of her old dose on her synthroid.

## 2018-12-13 NOTE — TELEPHONE ENCOUNTER
Apologize for the delay.  New script sent to [facility] and labs should be rechecked in 6-8 weeks, lab order placed.

## 2018-12-17 DIAGNOSIS — Z00.00 HEALTH CARE MAINTENANCE: ICD-10-CM

## 2018-12-17 DIAGNOSIS — Z00.00 HEALTHCARE MAINTENANCE: ICD-10-CM

## 2018-12-18 RX ORDER — CHLORAL HYDRATE 500 MG
CAPSULE ORAL
Qty: 100 CAPSULE | Refills: 1 | Status: SHIPPED | OUTPATIENT
Start: 2018-12-18 | End: 2019-07-08

## 2018-12-18 RX ORDER — ASCORBIC ACID 500 MG
TABLET ORAL
Qty: 100 TABLET | Refills: 0 | Status: SHIPPED | OUTPATIENT
Start: 2018-12-18 | End: 2019-06-24

## 2018-12-18 NOTE — TELEPHONE ENCOUNTER
Fish Oil      Last Written Prescription Date:  11/8/18  Last Fill Quantity: 100,   # refills: 0  Last Office Visit: 11/19/18      Vitamin C 500      Last Written Prescription Date:  6/5/18  Last Fill Quantity: 100,   # refills: 2  Last Office Visit: 11/19/18

## 2018-12-28 DIAGNOSIS — F43.21 GRIEF REACTION: ICD-10-CM

## 2018-12-31 RX ORDER — CITALOPRAM HYDROBROMIDE 10 MG/1
TABLET ORAL
Qty: 100 TABLET | Refills: 0 | Status: SHIPPED | OUTPATIENT
Start: 2018-12-31 | End: 2019-04-04

## 2019-01-10 NOTE — PROGRESS NOTES
SUBJECTIVE:                                                    Aaliyah Dean is a 48 year old female who presents to clinic today for the following health issues:      Behavioral Concerns      Patient has had more self-injurious behaviors.  She has been hitting her head and face purposefully.  Staff would like referral for neuropsych testing        Hypothyroidism Follow-up      Since last visit, patient describes the following symptoms: Weight stable, no hair loss, no skin changes, no constipation, no loose stools    Patient will be due for repeat TSH in a few weeks          Amount of exercise or physical activity: None    Problems taking medications regularly: No    Medication side effects: none    Diet: regular (no restrictions)        Problem list and histories reviewed & adjusted, as indicated.  Additional history: as documented    Patient Active Problem List   Diagnosis     Down's syndrome     Hypothyroidism     Hyperlipidemia     Disturbance of conduct     Convulsions (H)     Dysphagia     Allergic rhinitis     Other acne     Impulse control disorder     ACP (advance care planning)     Past Surgical History:   Procedure Laterality Date     CHOLECYSTECTOMY  01/2011     ENT SURGERY       EXAM UNDER ANESTHESIA PELVIC N/A 5/4/2016    Procedure: EXAM UNDER ANESTHESIA PELVIC;  Surgeon: Valentin Ferris MD;  Location: [facility]     GENITOURINARY SURGERY      INTERNAL URETHROTOMY     HERNIA REPAIR  march 4 2015    ventral herniorrhaphy with intraperitoneal sepramesh     ileostomy take-down  2011     illeostomy  04/25/2011     ventilation tubes, bilateral         Social History     Tobacco Use     Smoking status: Never Smoker     Smokeless tobacco: Never Used     Tobacco comment: no passive exposure   Substance Use Topics     Alcohol use: No     Family History   Problem Relation Age of Onset     Cancer Mother         cause of death     Diabetes No family hx of          Current Outpatient Medications   Medication Sig  Dispense Refill     Calcium Carb-Cholecalciferol (CALCIUM-VITAMIN D3) 250-125 MG-UNIT TABS TAKE ONE (1) TABLET BY MOUTH TWICE DAILY WITH MEALS 100 tablet 5     chlorhexidine (PERIDEX) 0.12 % solution SWAB ON TEETH AND GUMS FOR 30 SECONDS TWICE A  mL 0     citalopram (CELEXA) 10 MG tablet Take 1 tablet (10 mg) by mouth daily 100 tablet 0     FEROSUL 325 (65 Fe) MG tablet TAKE ONE (1) TABLET BY MOUTH TWICE DAILY 100 tablet 0     fish oil-omega-3 fatty acids 1000 MG capsule TAKE ONE CAPSULE BY MOUTH THREE TIMES DAILY 100 capsule 1     fluticasone (FLONASE) 50 MCG/ACT spray INSTILL 2 SPRAYS INTO BOTH NOSTRILS DAILY SHAKE GENTLY 16 g 10     guanFACINE (TENEX) 1 MG tablet Take 0.5 tablets (0.5 mg) by mouth 3 times daily 45 tablet 11     ibuprofen (ADVIL,MOTRIN) 400 MG tablet Take 1 tablet by mouth every 4-6 hours as needed 120 tablet 2     INTROVALE 0.15-0.03 MG per tablet TAKE ONE (1) TABLET BY MOUTH DAILY QUASENSE/JOLESSA 91 tablet 1     levothyroxine (SYNTHROID/LEVOTHROID) 175 MCG tablet Take 1 tablet (175 mcg) by mouth daily 30 tablet 2     loratadine (CLARITIN) 10 MG tablet Take 1 tablet (10 mg) by mouth daily 30 tablet 10     omeprazole (PRILOSEC) 20 MG CR capsule TAKE ONE (1) CAPSULE BY MOUTH DAILY 90 capsule 0     risperiDONE (RISPERDAL) 0.25 MG tablet TAKE ONE (1) TABLET BY MOUTH THREE TIMES DAILY 90 tablet 0     Simethicone (GAS-X EXTRA STRENGTH) 125 MG CAPS Take 1-2 capsules by mouth 2 times daily as needed 30 capsule 1     simvastatin (ZOCOR) 10 MG tablet TAKE ONE (1) TABLET BY MOUTH DAILY 30 tablet 8     Skin Protectants, Misc. (EUCERIN) cream        triamcinolone (KENALOG) 0.1 % cream Apply sparingly to affected area twice daily as needed 80 g 1     vitamin C (ASCORBIC ACID) 500 MG tablet TAKE ONE (1) TABLET BY MOUTH DAILY 100 tablet 0     Allergies   Allergen Reactions     Amoxicillin      Cephalexin Monohydrate      Keflex         ROS:  Constitutional, HEENT, cardiovascular, pulmonary, gi and gu  "systems are negative, except as otherwise noted.    OBJECTIVE:     /68 (BP Location: Left arm, Patient Position: Sitting, Cuff Size: Adult Regular)   Pulse 88   Wt 50 kg (110 lb 3.2 oz)   SpO2 96%   BMI 25.38 kg/m    Body mass index is 25.38 kg/m .  GENERAL: healthy, alert and no distress  PSYCH: affect normal/bright    Diagnostic Test Results:  none     ASSESSMENT/PLAN:     1. Disturbance of conduct  Referral made.  Would wait for repeat thyroid testing.  If normal, and neuropsych eval is still a ways off, could consider increasing Celexa.  Guardian did bring up \"adrenal fatigue\", this was discussed.  - NEUROPSYCHOLOGY REFERRAL    2. Impulse control disorder  As above  - NEUROPSYCHOLOGY REFERRAL    3. Acquired hypothyroidism  Will recheck levels in a few weeks.        Clarissa Santos MD  [facility]      "

## 2019-01-14 ENCOUNTER — OFFICE VISIT (OUTPATIENT)
Dept: FAMILY MEDICINE | Facility: OTHER | Age: 49
End: 2019-01-14
Attending: FAMILY MEDICINE
Payer: MEDICARE

## 2019-01-14 VITALS
BODY MASS INDEX: 25.38 KG/M2 | WEIGHT: 110.2 LBS | SYSTOLIC BLOOD PRESSURE: 100 MMHG | OXYGEN SATURATION: 96 % | HEART RATE: 88 BPM | DIASTOLIC BLOOD PRESSURE: 68 MMHG

## 2019-01-14 DIAGNOSIS — F63.9 IMPULSE CONTROL DISORDER: ICD-10-CM

## 2019-01-14 DIAGNOSIS — F91.9 DISTURBANCE OF CONDUCT: Primary | ICD-10-CM

## 2019-01-14 DIAGNOSIS — F95.2 TOURETTE'S DISORDER: ICD-10-CM

## 2019-01-14 DIAGNOSIS — E03.9 ACQUIRED HYPOTHYROIDISM: ICD-10-CM

## 2019-01-14 DIAGNOSIS — Z00.00 HEALTHCARE MAINTENANCE: ICD-10-CM

## 2019-01-14 PROCEDURE — 99213 OFFICE O/P EST LOW 20 MIN: CPT | Performed by: FAMILY MEDICINE

## 2019-01-14 PROCEDURE — G0463 HOSPITAL OUTPT CLINIC VISIT: HCPCS

## 2019-01-14 RX ORDER — RISPERIDONE 0.25 MG/1
TABLET ORAL
Qty: 90 TABLET | Refills: 0 | Status: SHIPPED | OUTPATIENT
Start: 2019-01-14 | End: 2019-02-11

## 2019-01-14 RX ORDER — FERROUS SULFATE 325(65) MG
TABLET ORAL
Qty: 100 TABLET | Refills: 0 | Status: SHIPPED | OUTPATIENT
Start: 2019-01-14 | End: 2019-03-04

## 2019-01-14 ASSESSMENT — PAIN SCALES - GENERAL: PAINLEVEL: NO PAIN (0)

## 2019-01-14 NOTE — TELEPHONE ENCOUNTER
Risperdal  Last Written Prescription Date: 11/15/18  Last Fill Quantity: 90 # of Refills: 0  Last Office Visit: 11/19/18    Iron  Last Written Prescription Date: 11/20/18  Last Fill Quantity: 100 # of Refills: 0  Last Office Visit: 11/19/18

## 2019-01-14 NOTE — NURSING NOTE
"Chief Complaint   Patient presents with     Recheck Medication       Initial /68 (BP Location: Left arm, Patient Position: Sitting, Cuff Size: Adult Regular)   Pulse 88   Wt 50 kg (110 lb 3.2 oz)   SpO2 96%   BMI 25.38 kg/m   Estimated body mass index is 25.38 kg/m  as calculated from the following:    Height as of 11/19/18: 1.403 m (4' 7.25\").    Weight as of this encounter: 50 kg (110 lb 3.2 oz).  Medication Reconciliation: complete    Doris Garner MA  "

## 2019-01-18 DIAGNOSIS — F63.9 IMPULSE CONTROL DISORDER: ICD-10-CM

## 2019-01-18 DIAGNOSIS — F91.9 DISTURBANCE OF CONDUCT: Primary | ICD-10-CM

## 2019-01-31 DIAGNOSIS — E03.9 HYPOTHYROIDISM, UNSPECIFIED TYPE: ICD-10-CM

## 2019-01-31 RX ORDER — LEVOTHYROXINE SODIUM 175 UG/1
175 TABLET ORAL DAILY
Qty: 30 TABLET | Refills: 2 | Status: SHIPPED | OUTPATIENT
Start: 2019-01-31 | End: 2019-05-02

## 2019-01-31 NOTE — TELEPHONE ENCOUNTER
Levothyroxine (SYNTHROID/LEVOTHROID)      Last Written Prescription Date:  12/13/18  Last Fill Quantity: 30,   # refills: 2  Last Office Visit: 1/14/19

## 2019-02-07 DIAGNOSIS — E78.5 HYPERLIPIDEMIA LDL GOAL <100: ICD-10-CM

## 2019-02-07 RX ORDER — SIMVASTATIN 10 MG
TABLET ORAL
Qty: 30 TABLET | Refills: 5 | Status: SHIPPED | OUTPATIENT
Start: 2019-02-07 | End: 2019-08-05

## 2019-02-07 NOTE — TELEPHONE ENCOUNTER
simvastatin (ZOCOR) 10 MG tablet  Last Written Prescription Date:  6/18/18  Last Fill Quantity: 30,   # refills: 8  Last Office Visit: 1/14/19  Future Office visit:

## 2019-02-11 DIAGNOSIS — F95.2 TOURETTE'S DISORDER: ICD-10-CM

## 2019-02-11 DIAGNOSIS — Z00.00 HEALTH CARE MAINTENANCE: ICD-10-CM

## 2019-02-11 DIAGNOSIS — R13.10 DYSPHAGIA, UNSPECIFIED TYPE: ICD-10-CM

## 2019-02-11 RX ORDER — CHLORHEXIDINE GLUCONATE ORAL RINSE 1.2 MG/ML
SOLUTION DENTAL
Qty: 473 ML | Refills: 1 | Status: SHIPPED | OUTPATIENT
Start: 2019-02-11 | End: 2019-05-20

## 2019-02-11 RX ORDER — RISPERIDONE 0.25 MG/1
TABLET ORAL
Qty: 90 TABLET | Refills: 2 | Status: SHIPPED | OUTPATIENT
Start: 2019-02-11 | End: 2019-04-11

## 2019-02-11 NOTE — TELEPHONE ENCOUNTER
Not on protocol, please advise.    chlorhexidine (PERIDEX) 0.12 % solution      Last Written Prescription Date:  10/22/18  Last Fill Quantity: 473ml,   # refills: 0  Last Office Visit: 1/14/19  Future Office visit:       Routing refill request to provider for review/approval because:  Drug not on the [facility], [facility] or [facility] refill protocol or controlled substance

## 2019-02-13 ENCOUNTER — TELEPHONE (OUTPATIENT)
Dept: FAMILY MEDICINE | Facility: OTHER | Age: 49
End: 2019-02-13

## 2019-02-13 NOTE — TELEPHONE ENCOUNTER
2/13/19 Received PA request from [employer] for Guanfacine 1mg tablets. Submitted request through [facility]. Waiting for response.

## 2019-02-13 NOTE — TELEPHONE ENCOUNTER
APPROVAL 2/13/19 Received approval from [employer] for Guanfacine. Approval dates 1/1/19 through 2/13/20. Pharmacy advised. Forms scanned to [email or web address].

## 2019-02-14 DIAGNOSIS — E03.9 HYPOTHYROIDISM, UNSPECIFIED TYPE: Primary | ICD-10-CM

## 2019-02-14 DIAGNOSIS — E03.9 HYPOTHYROIDISM, UNSPECIFIED TYPE: ICD-10-CM

## 2019-02-14 LAB — TSH SERPL DL<=0.005 MIU/L-ACNC: 0.4 MU/L (ref 0.4–4)

## 2019-02-14 PROCEDURE — 36415 COLL VENOUS BLD VENIPUNCTURE: CPT | Mod: ZL | Performed by: FAMILY MEDICINE

## 2019-02-14 PROCEDURE — 84443 ASSAY THYROID STIM HORMONE: CPT | Mod: ZL | Performed by: FAMILY MEDICINE

## 2019-02-26 ENCOUNTER — TRANSFERRED RECORDS (OUTPATIENT)
Dept: HEALTH INFORMATION MANAGEMENT | Facility: CLINIC | Age: 49
End: 2019-02-26

## 2019-04-04 DIAGNOSIS — F43.21 GRIEF REACTION: ICD-10-CM

## 2019-04-04 RX ORDER — CITALOPRAM HYDROBROMIDE 10 MG/1
TABLET ORAL
Qty: 100 TABLET | Refills: 0 | Status: SHIPPED | OUTPATIENT
Start: 2019-04-04 | End: 2019-05-20

## 2019-04-04 NOTE — TELEPHONE ENCOUNTER
citalopram (CELEXA) 10 MG tablet    Last Written Prescription Date:  12/31/2018  Last Fill Quantity: 100,   # refills: 0  Last Office Visit: 01/14/2019  Future Office visit:    Next 5 appointments (look out 90 days)    May 20, 2019  9:00 AM CDT  (Arrive by 8:45 AM)  PHYSICAL with Clarissa Santos MD  [facility] ([facility] ) [address]  [facility] [address]  [phone]           Routing refill request to provider for review/approval because:

## 2019-04-11 DIAGNOSIS — F95.2 TOURETTE'S DISORDER: ICD-10-CM

## 2019-04-11 RX ORDER — RISPERIDONE 0.25 MG/1
TABLET ORAL
Qty: 90 TABLET | Refills: 0 | Status: SHIPPED | OUTPATIENT
Start: 2019-04-11 | End: 2019-06-13

## 2019-04-22 DIAGNOSIS — Z00.00 HEALTHCARE MAINTENANCE: ICD-10-CM

## 2019-04-22 NOTE — TELEPHONE ENCOUNTER
FEROSUL 325 (65 Fe) MG tablet  Last Written Prescription Date:  3/5/19  Last Fill Quantity: 100,   # refills: 1  Last Office Visit: 1/14/19  Future Office visit:    Next 5 appointments (look out 90 days)    May 20, 2019  9:00 AM CDT  (Arrive by 8:45 AM)  PHYSICAL with Clarissa Santos MD  [facility] ([facility] ) [address]  [place] [address]  [phone]

## 2019-04-24 RX ORDER — FERROUS SULFATE 325(65) MG
TABLET ORAL
Qty: 100 TABLET | Refills: 1 | OUTPATIENT
Start: 2019-04-24

## 2019-05-02 DIAGNOSIS — E03.9 HYPOTHYROIDISM, UNSPECIFIED TYPE: ICD-10-CM

## 2019-05-02 RX ORDER — LEVOTHYROXINE SODIUM 175 UG/1
175 TABLET ORAL DAILY
Qty: 30 TABLET | Refills: 2 | Status: SHIPPED | OUTPATIENT
Start: 2019-05-02 | End: 2019-05-20

## 2019-05-02 NOTE — TELEPHONE ENCOUNTER
levothyroxine (SYNTHROID/LEVOTHROID) 175 MCG tablet      Last Written Prescription Date:  01/31/2019  Last Fill Quantity: 30,   # refills: 2  Last Office Visit: 01/14/2019  Future Office visit:    Next 5 appointments (look out 90 days)    May 20, 2019  9:00 AM CDT  (Arrive by 8:45 AM)  PHYSICAL with Clarissa Santos MD  [facility] ([facility] ) [address]  [place] [facility] [place] [address]  [phone]           Routing refill request to provider for review/approval because:

## 2019-05-13 DIAGNOSIS — R13.10 DYSPHAGIA, UNSPECIFIED TYPE: ICD-10-CM

## 2019-05-14 NOTE — PROGRESS NOTES
SUBJECTIVE:   CC: Aaliyah Dean is an 48 year old woman who presents for preventive health visit.     Healthy Habits:    Do you get at least three servings of calcium containing foods daily (dairy, green leafy vegetables, etc.)? yes    Amount of exercise or daily activities, outside of work: 3 day(s) per week    Problems taking medications regularly No    Medication side effects: No    Have you had an eye exam in the past two years? yes    Do you see a dentist twice per year? yes    Do you have sleep apnea, excessive snoring or daytime drowsiness?no      Musculoskeletal problem/pain      Duration: 2 weeks    Description  Location: right shoulder    Intensity:  moderate    Accompanying signs and symptoms: none    History  Previous similar problem: no   Previous evaluation:  none    Precipitating or alleviating factors:  Trauma or overuse: no   Aggravating factors include: none    Therapies tried and outcome: nothing      Patient did recently complete neuropsych testing due to increase in SIB - hitting her head, acting out, etc.  No medication advice was given, testing showed patient had intellectual capacity of a 4 year old, dementia testing was inconclusive as no baseline testing was obtained previously.  Staff and family wonder about increasing Celexa dosing.      Today's PHQ-2 Score: No flowsheet data found.      Social History     Tobacco Use     Smoking status: Never Smoker     Smokeless tobacco: Never Used     Tobacco comment: no passive exposure   Substance Use Topics     Alcohol use: No     If you drink alcohol do you typically have >3 drinks per day or >7 drinks per week? No                     Reviewed orders with patient.  Reviewed health maintenance and updated orders accordingly - Yes  Patient Active Problem List   Diagnosis     Down's syndrome     Hypothyroidism     Hyperlipidemia     Disturbance of conduct     Convulsions (H)     Dysphagia     Allergic rhinitis     Other acne     Impulse  control disorder     ACP (advance care planning)     Past Surgical History:   Procedure Laterality Date     CHOLECYSTECTOMY  01/2011     ENT SURGERY       EXAM UNDER ANESTHESIA PELVIC N/A 5/4/2016    Procedure: EXAM UNDER ANESTHESIA PELVIC;  Surgeon: Valentin Ferris MD;  Location: [facility]     GENITOURINARY SURGERY      INTERNAL URETHROTOMY     HERNIA REPAIR  march 4 2015    ventral herniorrhaphy with intraperitoneal sepramesh     ileostomy take-down  2011     illeostomy  04/25/2011     ventilation tubes, bilateral         Social History     Tobacco Use     Smoking status: Never Smoker     Smokeless tobacco: Never Used     Tobacco comment: no passive exposure   Substance Use Topics     Alcohol use: No     Family History   Problem Relation Age of Onset     Cancer Mother         cause of death     Diabetes No family hx of          Current Outpatient Medications   Medication Sig Dispense Refill     Calcium Carb-Cholecalciferol (CALCIUM-VITAMIN D3) 250-125 MG-UNIT TABS TAKE ONE (1) TABLET BY MOUTH TWICE DAILY WITH MEALS 100 tablet 5     chlorhexidine (PERIDEX) 0.12 % solution SWAB ON TEETH AND GUMS FOR 30 SECONDS TWICE A  mL 1     citalopram (CELEXA) 20 MG tablet Take 1 tablet (20 mg) by mouth daily 30 tablet 5     FEROSUL 325 (65 Fe) MG tablet TAKE ONE (1) TABLET BY MOUTH TWICE DAILY 100 tablet 1     fish oil-omega-3 fatty acids 1000 MG capsule TAKE ONE CAPSULE BY MOUTH THREE TIMES DAILY 100 capsule 1     fluticasone (FLONASE) 50 MCG/ACT spray INSTILL 2 SPRAYS INTO BOTH NOSTRILS DAILY SHAKE GENTLY 16 g 10     guanFACINE (TENEX) 1 MG tablet Take 0.5 tablets (0.5 mg) by mouth 3 times daily 45 tablet 11     ibuprofen (ADVIL,MOTRIN) 400 MG tablet Take 1 tablet by mouth every 4-6 hours as needed 120 tablet 2     JOLESSA 0.15-0.03 MG tablet TAKE ONE (1) TABLET BY MOUTH DAILY QUASENSE/JOLESSA 91 tablet 1     levothyroxine (SYNTHROID/LEVOTHROID) 175 MCG tablet Take 1 tablet (175 mcg) by mouth daily 30 tablet 2      loratadine (CLARITIN) 10 MG tablet Take 1 tablet (10 mg) by mouth daily 30 tablet 10     omeprazole (PRILOSEC) 20 MG DR capsule TAKE ONE (1) CAPSULE BY MOUTH DAILY 90 capsule 0     risperiDONE (RISPERDAL) 0.25 MG tablet TAKE ONE (1) TABLET BY MOUTH THREE TIMES DAILY 90 tablet 0     Simethicone (GAS-X EXTRA STRENGTH) 125 MG CAPS Take 1-2 capsules by mouth 2 times daily as needed 30 capsule 1     simvastatin (ZOCOR) 10 MG tablet TAKE ONE (1) TABLET BY MOUTH DAILY 30 tablet 5     Skin Protectants, Misc. (EUCERIN) cream        triamcinolone (KENALOG) 0.1 % cream Apply sparingly to affected area twice daily as needed 80 g 1     vitamin C (ASCORBIC ACID) 500 MG tablet TAKE ONE (1) TABLET BY MOUTH DAILY 100 tablet 0     Allergies   Allergen Reactions     Amoxicillin      Cephalexin Monohydrate      Keflex         Mammogram Screening: Patient over age 50, mutual decision to screen reflected in health maintenance.    Pertinent mammograms are reviewed under the imaging tab.  History of abnormal Pap smear: NO - age 30-65 PAP every 5 years with negative HPV co-testing recommended  PAP / HPV 5/4/2016   PAP NIL     Reviewed and updated as needed this visit by clinical staff  Allergies  Meds         Reviewed and updated as needed this visit by Provider            ROS:  CONSTITUTIONAL: NEGATIVE for fever, chills, change in weight  INTEGUMENTARU/SKIN: NEGATIVE for worrisome rashes, moles or lesions  EYES: NEGATIVE for vision changes or irritation  ENT: NEGATIVE for ear, mouth and throat problems  RESP: NEGATIVE for significant cough or SOB  BREAST: NEGATIVE for masses, tenderness or discharge  CV: NEGATIVE for chest pain, palpitations or peripheral edema  GI: NEGATIVE for nausea, abdominal pain, heartburn, or change in bowel habits  : NEGATIVE for unusual urinary or vaginal symptoms. Periods are regular.  MUSCULOSKELETAL:POSITIVE  for right shoulder pain  NEURO: NEGATIVE for weakness, dizziness or paresthesias  PSYCHIATRIC:  "POSITIVE for increase situational anxiety    OBJECTIVE:   BP 96/62 (BP Location: Left arm, Patient Position: Sitting, Cuff Size: Adult Regular)   Pulse 73   Temp 96.9  F (36.1  C) (Tympanic)   Resp 14   Ht 1.365 m (4' 5.75\")   Wt 48.5 kg (107 lb)   SpO2 98%   BMI 26.04 kg/m    EXAM:  GENERAL: healthy, alert and no distress  EYES: Eyes grossly normal to inspection, PERRL and conjunctivae and sclerae normal  HENT: ear canals and TM's normal, nose and mouth without ulcers or lesions  NECK: no adenopathy  RESP: lungs clear to auscultation - no rales, rhonchi or wheezes  CV: regular rate and rhythm, normal S1 S2, no S3 or S4, no murmur, click or rub, no peripheral edema and peripheral pulses strong  ABDOMEN: soft, nontender, no hepatosplenomegaly, no masses and bowel sounds normal  MS: pain along long head of biceps tendon in shoulder, patient would not abduct arm  SKIN: no suspicious lesions or rashes  NEURO: Normal strength and tone, mentation intact and speech normal  PSYCH: mentation appears normal, affect normal/bright    Diagnostic Test Results:  none     ASSESSMENT/PLAN:   1. Health care maintenance  Forms completed.  Follow-up on annual basis.    2. Acute pain of right shoulder  Ice and ibuprofen recommended.  Consider Orthopedics appointment if no improvement noted.    3. Down's syndrome  No changes to current care plan except medication change as below.    4. Impulse control disorder      5. Grief reaction  Will increase Celexa to 20 mg daily.  Follow-up in one month for review of symptoms.  - citalopram (CELEXA) 20 MG tablet; Take 1 tablet (20 mg) by mouth daily  Dispense: 30 tablet; Refill: 5    6. Hyperlipidemia, unspecified hyperlipidemia type  Labs updated.  - Lipid Profile  - Comprehensive metabolic panel  - CBC with platelets    7. Acquired hypothyroidism  Labs updated.  - TSH with free T4 reflex    8. Convulsions, unspecified convulsion type (H)  Labs updated.  - Comprehensive metabolic panel  - " "CBC with platelets    COUNSELING:   Reviewed preventive health counseling, as reflected in patient instructions    Estimated body mass index is 26.04 kg/m  as calculated from the following:    Height as of this encounter: 1.365 m (4' 5.75\").    Weight as of this encounter: 48.5 kg (107 lb).         reports that she has never smoked. She has never used smokeless tobacco.      Counseling Resources:  ATP IV Guidelines  Pooled Cohorts Equation Calculator  Breast Cancer Risk Calculator  FRAX Risk Assessment  ICSI Preventive Guidelines  Dietary Guidelines for Americans, 2010  USDA's MyPlate  ASA Prophylaxis  Lung CA Screening    Clarissa Santos MD  [facility]  "

## 2019-05-20 ENCOUNTER — OFFICE VISIT (OUTPATIENT)
Dept: FAMILY MEDICINE | Facility: OTHER | Age: 49
End: 2019-05-20
Attending: FAMILY MEDICINE
Payer: MEDICARE

## 2019-05-20 VITALS
OXYGEN SATURATION: 98 % | TEMPERATURE: 96.9 F | HEART RATE: 73 BPM | WEIGHT: 107 LBS | SYSTOLIC BLOOD PRESSURE: 96 MMHG | RESPIRATION RATE: 14 BRPM | DIASTOLIC BLOOD PRESSURE: 62 MMHG | HEIGHT: 55 IN | BODY MASS INDEX: 24.76 KG/M2

## 2019-05-20 DIAGNOSIS — F43.21 GRIEF REACTION: ICD-10-CM

## 2019-05-20 DIAGNOSIS — Z00.00 HEALTH CARE MAINTENANCE: Primary | ICD-10-CM

## 2019-05-20 DIAGNOSIS — F63.9 IMPULSE CONTROL DISORDER: ICD-10-CM

## 2019-05-20 DIAGNOSIS — E03.9 ACQUIRED HYPOTHYROIDISM: ICD-10-CM

## 2019-05-20 DIAGNOSIS — R56.9 CONVULSIONS, UNSPECIFIED CONVULSION TYPE (H): ICD-10-CM

## 2019-05-20 DIAGNOSIS — E78.5 HYPERLIPIDEMIA, UNSPECIFIED HYPERLIPIDEMIA TYPE: ICD-10-CM

## 2019-05-20 DIAGNOSIS — Q90.9 DOWN'S SYNDROME: ICD-10-CM

## 2019-05-20 DIAGNOSIS — M25.511 ACUTE PAIN OF RIGHT SHOULDER: ICD-10-CM

## 2019-05-20 DIAGNOSIS — Z00.00 HEALTH CARE MAINTENANCE: ICD-10-CM

## 2019-05-20 DIAGNOSIS — E03.9 HYPOTHYROIDISM, UNSPECIFIED TYPE: ICD-10-CM

## 2019-05-20 LAB
ALBUMIN SERPL-MCNC: 3.1 G/DL (ref 3.4–5)
ALP SERPL-CCNC: 97 U/L (ref 40–150)
ALT SERPL W P-5'-P-CCNC: 33 U/L (ref 0–50)
ANION GAP SERPL CALCULATED.3IONS-SCNC: 7 MMOL/L (ref 3–14)
AST SERPL W P-5'-P-CCNC: 23 U/L (ref 0–45)
BILIRUB SERPL-MCNC: 0.5 MG/DL (ref 0.2–1.3)
BUN SERPL-MCNC: 19 MG/DL (ref 7–30)
CALCIUM SERPL-MCNC: 8.9 MG/DL (ref 8.5–10.1)
CHLORIDE SERPL-SCNC: 107 MMOL/L (ref 94–109)
CHOLEST SERPL-MCNC: 122 MG/DL
CO2 SERPL-SCNC: 25 MMOL/L (ref 20–32)
CREAT SERPL-MCNC: 0.92 MG/DL (ref 0.52–1.04)
ERYTHROCYTE [DISTWIDTH] IN BLOOD BY AUTOMATED COUNT: 12.7 % (ref 10–15)
GFR SERPL CREATININE-BSD FRML MDRD: 74 ML/MIN/{1.73_M2}
GLUCOSE SERPL-MCNC: 81 MG/DL (ref 70–99)
HCT VFR BLD AUTO: 41 % (ref 35–47)
HDLC SERPL-MCNC: 45 MG/DL
HGB BLD-MCNC: 13.4 G/DL (ref 11.7–15.7)
LDLC SERPL CALC-MCNC: 43 MG/DL
MCH RBC QN AUTO: 33.9 PG (ref 26.5–33)
MCHC RBC AUTO-ENTMCNC: 32.7 G/DL (ref 31.5–36.5)
MCV RBC AUTO: 104 FL (ref 78–100)
NONHDLC SERPL-MCNC: 77 MG/DL
PLATELET # BLD AUTO: 245 10E9/L (ref 150–450)
POTASSIUM SERPL-SCNC: 4.9 MMOL/L (ref 3.4–5.3)
PROT SERPL-MCNC: 7 G/DL (ref 6.8–8.8)
RBC # BLD AUTO: 3.95 10E12/L (ref 3.8–5.2)
SODIUM SERPL-SCNC: 139 MMOL/L (ref 133–144)
T4 FREE SERPL-MCNC: 1.54 NG/DL (ref 0.76–1.46)
TRIGL SERPL-MCNC: 172 MG/DL
TSH SERPL DL<=0.005 MIU/L-ACNC: 0.02 MU/L (ref 0.4–4)
WBC # BLD AUTO: 7.6 10E9/L (ref 4–11)

## 2019-05-20 PROCEDURE — 84439 ASSAY OF FREE THYROXINE: CPT | Mod: ZL | Performed by: FAMILY MEDICINE

## 2019-05-20 PROCEDURE — 85027 COMPLETE CBC AUTOMATED: CPT | Mod: ZL | Performed by: FAMILY MEDICINE

## 2019-05-20 PROCEDURE — 84443 ASSAY THYROID STIM HORMONE: CPT | Mod: ZL | Performed by: FAMILY MEDICINE

## 2019-05-20 PROCEDURE — 99214 OFFICE O/P EST MOD 30 MIN: CPT | Performed by: FAMILY MEDICINE

## 2019-05-20 PROCEDURE — 36415 COLL VENOUS BLD VENIPUNCTURE: CPT | Mod: ZL | Performed by: FAMILY MEDICINE

## 2019-05-20 PROCEDURE — 80053 COMPREHEN METABOLIC PANEL: CPT | Mod: ZL | Performed by: FAMILY MEDICINE

## 2019-05-20 PROCEDURE — G0463 HOSPITAL OUTPT CLINIC VISIT: HCPCS

## 2019-05-20 PROCEDURE — 80061 LIPID PANEL: CPT | Mod: ZL | Performed by: FAMILY MEDICINE

## 2019-05-20 RX ORDER — CITALOPRAM HYDROBROMIDE 20 MG/1
20 TABLET ORAL DAILY
Qty: 30 TABLET | Refills: 5 | Status: SHIPPED | OUTPATIENT
Start: 2019-05-20 | End: 2019-10-10

## 2019-05-20 RX ORDER — CHLORHEXIDINE GLUCONATE ORAL RINSE 1.2 MG/ML
SOLUTION DENTAL
Qty: 473 ML | Refills: 11 | Status: SHIPPED | OUTPATIENT
Start: 2019-05-20 | End: 2020-07-07

## 2019-05-20 RX ORDER — LEVOTHYROXINE SODIUM 150 UG/1
150 TABLET ORAL DAILY
Qty: 30 TABLET | Refills: 2 | Status: SHIPPED | OUTPATIENT
Start: 2019-05-20 | End: 2019-06-19

## 2019-05-20 ASSESSMENT — MIFFLIN-ST. JEOR: SCORE: 947.63

## 2019-05-20 NOTE — TELEPHONE ENCOUNTER
chlorhexidine (PERIDEX) 0.12 % solution swab on teeth and gums for 30 seconds twice daily.        Last Written Prescription Date:  02/11/2019  Last Fill Quantity: 473 ml,   # refills: 1  Last Office Visit: 5/20/2019  Future Office visit:    Next 5 appointments (look out 90 days)    Jun 20, 2019  3:45 PM CDT  (Arrive by 3:30 PM)  SHORT with Clarissa Santos MD  [facility] ([facility] ) [address]  [facility] [address]  [phone]           Routing refill request to provider for review/approval because:  Drug not on the [facility], P or  Health refill protocol or controlled substance

## 2019-06-03 DIAGNOSIS — Z00.00 HEALTHCARE MAINTENANCE: ICD-10-CM

## 2019-06-04 RX ORDER — CHLORAL HYDRATE 500 MG
CAPSULE ORAL
Qty: 100 CAPSULE | Refills: 1 | OUTPATIENT
Start: 2019-06-04

## 2019-06-18 NOTE — PROGRESS NOTES
Subjective     Aaliyah Dean is a 48 year old female who presents to clinic today for the following health issues:    HPI     Anxiety Follow-Up    How are you doing with your anxiety since your last visit? Improved    Are you having other symptoms that might be associated with anxiety? No    Have you had a significant life event? No     Are you feeling depressed? No    Do you have any concerns with your use of alcohol or other drugs? No    Social History     Tobacco Use     Smoking status: Never Smoker     Smokeless tobacco: Never Used     Tobacco comment: no passive exposure   Substance Use Topics     Alcohol use: No     Drug use: No     No flowsheet data found.  No flowsheet data found.        Patient Active Problem List   Diagnosis     Down's syndrome     Hypothyroidism     Hyperlipidemia     Disturbance of conduct     Convulsions (H)     Dysphagia     Allergic rhinitis     Other acne     Impulse control disorder     ACP (advance care planning)     Anxiety     Past Surgical History:   Procedure Laterality Date     CHOLECYSTECTOMY  01/2011     ENT SURGERY       EXAM UNDER ANESTHESIA PELVIC N/A 5/4/2016    Procedure: EXAM UNDER ANESTHESIA PELVIC;  Surgeon: Valentin Ferris MD;  Location: [facility]     GENITOURINARY SURGERY      INTERNAL URETHROTOMY     HERNIA REPAIR  march 4 2015    ventral herniorrhaphy with intraperitoneal sepramesh     ileostomy take-down  2011     illeostomy  04/25/2011     ventilation tubes, bilateral         Social History     Tobacco Use     Smoking status: Never Smoker     Smokeless tobacco: Never Used     Tobacco comment: no passive exposure   Substance Use Topics     Alcohol use: No     Family History   Problem Relation Age of Onset     Cancer Mother         cause of death     Diabetes No family hx of          Current Outpatient Medications   Medication Sig Dispense Refill     Calcium Carb-Cholecalciferol (CALCIUM-VITAMIN D3) 250-125 MG-UNIT TABS TAKE ONE (1) TABLET BY MOUTH TWICE DAILY  WITH MEALS 100 tablet 5     chlorhexidine (PERIDEX) 0.12 % solution SWAB ON TEETH AND GUMS FOR 30 SECONDS TWICE A  mL 11     citalopram (CELEXA) 20 MG tablet Take 1 tablet (20 mg) by mouth daily 30 tablet 5     FEROSUL 325 (65 Fe) MG tablet TAKE ONE (1) TABLET BY MOUTH TWICE DAILY 100 tablet 1     fish oil-omega-3 fatty acids 1000 MG capsule TAKE ONE CAPSULE BY MOUTH THREE TIMES DAILY 100 capsule 1     fluticasone (FLONASE) 50 MCG/ACT nasal spray INSTILL 2 SPRAYS INTO BOTH NOSTRILS DAILY SHAKE GENTLY 16 g 10     guanFACINE (TENEX) 1 MG tablet Take 0.5 tablets (0.5 mg) by mouth 3 times daily 45 tablet 11     ibuprofen (ADVIL,MOTRIN) 400 MG tablet Take 1 tablet by mouth every 4-6 hours as needed 120 tablet 2     JOLESSA 0.15-0.03 MG tablet TAKE ONE (1) TABLET BY MOUTH DAILY QUASENSE/JOLESSA 91 tablet 1     levothyroxine (SYNTHROID/LEVOTHROID) 150 MCG tablet Take 1 tablet (150 mcg) by mouth daily 30 tablet 0     loratadine (CLARITIN) 10 MG tablet Take 1 tablet (10 mg) by mouth daily 30 tablet 10     omeprazole (PRILOSEC) 20 MG DR capsule TAKE ONE (1) CAPSULE BY MOUTH DAILY 90 capsule 0     risperiDONE (RISPERDAL) 0.25 MG tablet TAKE ONE (1) TABLET BY MOUTH THREE TIMES DAILY 90 tablet 0     Simethicone (GAS-X EXTRA STRENGTH) 125 MG CAPS Take 1-2 capsules by mouth 2 times daily as needed 30 capsule 1     simvastatin (ZOCOR) 10 MG tablet TAKE ONE (1) TABLET BY MOUTH DAILY 30 tablet 5     Skin Protectants, Misc. (EUCERIN) cream        triamcinolone (KENALOG) 0.1 % cream Apply sparingly to affected area twice daily as needed 80 g 1     vitamin C (ASCORBIC ACID) 500 MG tablet TAKE ONE (1) TABLET BY MOUTH DAILY 100 tablet 0     Allergies   Allergen Reactions     Amoxicillin      Cephalexin Monohydrate      Keflex           Reviewed and updated as needed this visit by Provider         Review of Systems   ROS COMP: Constitutional, HEENT, cardiovascular, pulmonary, gi and gu systems are negative, except as otherwise  "noted.      Objective    /66 (BP Location: Right arm, Patient Position: Sitting, Cuff Size: Adult Regular)   Pulse 71   Temp 96.6  F (35.9  C) (Tympanic)   Resp 16   Wt 49.1 kg (108 lb 3.2 oz)   SpO2 97%   BMI 26.33 kg/m    Body mass index is 26.33 kg/m .  Physical Exam   GENERAL: healthy, alert and no distress  PSYCH: mentation appears normal, affect normal/bright    Diagnostic Test Results:  none         Assessment & Plan     1. Anxiety  Patient is doing well with medication adjustment.  Follow-up in about 5 months for regular medication review, sooner as needed.       BMI:   Estimated body mass index is 26.33 kg/m  as calculated from the following:    Height as of 5/20/19: 1.365 m (4' 5.75\").    Weight as of this encounter: 49.1 kg (108 lb 3.2 oz).         Return in about 5 months (around 11/20/2019) for Chronic Disease Management.    Clarissa Santos MD  [facility]      "

## 2019-06-19 DIAGNOSIS — E03.9 HYPOTHYROIDISM, UNSPECIFIED TYPE: ICD-10-CM

## 2019-06-19 RX ORDER — LEVOTHYROXINE SODIUM 150 UG/1
150 TABLET ORAL DAILY
Qty: 30 TABLET | Refills: 0 | Status: SHIPPED | OUTPATIENT
Start: 2019-06-19 | End: 2019-08-12

## 2019-06-19 NOTE — TELEPHONE ENCOUNTER
levothyroxine (SYNTHROID/LEVOTHROID) 150 MCG tablet    Last Written Prescription Date:  05/20/2019  Last Fill Quantity: 30,   # refills: 2  Last Office Visit: 05/20/2019  Future Office visit:    Next 5 appointments (look out 90 days)    Jun 20, 2019  3:45 PM CDT  (Arrive by 3:30 PM)  SHORT with Clarissa Santos MD  [facility] ([facility] ) [address]  [facility] [address]  [phone]           Routing refill request to provider for review/approval because:

## 2019-06-20 ENCOUNTER — OFFICE VISIT (OUTPATIENT)
Dept: FAMILY MEDICINE | Facility: OTHER | Age: 49
End: 2019-06-20
Attending: FAMILY MEDICINE
Payer: MEDICARE

## 2019-06-20 VITALS
HEART RATE: 71 BPM | OXYGEN SATURATION: 97 % | RESPIRATION RATE: 16 BRPM | SYSTOLIC BLOOD PRESSURE: 108 MMHG | DIASTOLIC BLOOD PRESSURE: 66 MMHG | TEMPERATURE: 96.6 F | WEIGHT: 108.2 LBS | BODY MASS INDEX: 26.33 KG/M2

## 2019-06-20 DIAGNOSIS — F41.9 ANXIETY: Primary | ICD-10-CM

## 2019-06-20 PROCEDURE — 99213 OFFICE O/P EST LOW 20 MIN: CPT | Performed by: FAMILY MEDICINE

## 2019-06-20 PROCEDURE — G0463 HOSPITAL OUTPT CLINIC VISIT: HCPCS

## 2019-06-20 ASSESSMENT — PAIN SCALES - GENERAL: PAINLEVEL: NO PAIN (0)

## 2019-06-20 NOTE — NURSING NOTE
"Chief Complaint   Patient presents with     Lipids     Thyroid Problem       Initial /66 (BP Location: Right arm, Patient Position: Sitting, Cuff Size: Adult Regular)   Pulse 71   Temp 96.6  F (35.9  C) (Tympanic)   Resp 16   Wt 49.1 kg (108 lb 3.2 oz)   SpO2 97%   BMI 26.33 kg/m   Estimated body mass index is 26.33 kg/m  as calculated from the following:    Height as of 5/20/19: 1.365 m (4' 5.75\").    Weight as of this encounter: 49.1 kg (108 lb 3.2 oz).  Medication Reconciliation: complete      "

## 2019-06-24 DIAGNOSIS — Z00.00 HEALTH CARE MAINTENANCE: ICD-10-CM

## 2019-06-24 RX ORDER — ASCORBIC ACID 500 MG
TABLET ORAL
Qty: 100 TABLET | Refills: 0 | Status: SHIPPED | OUTPATIENT
Start: 2019-06-24 | End: 2019-07-08

## 2019-06-24 NOTE — TELEPHONE ENCOUNTER
Vitamin C      Last Written Prescription Date:  12/18/2018  Last Fill Quantity: 100,   # refills: 0  Last Office Visit: 6/20/2019  Future Office visit:       Routing refill request to provider for review/approval because:  Drug not on the [facility], P or [facility] refill protocol or controlled substance

## 2019-07-08 DIAGNOSIS — E03.9 HYPOTHYROIDISM, UNSPECIFIED TYPE: ICD-10-CM

## 2019-07-08 DIAGNOSIS — Z00.00 HEALTH CARE MAINTENANCE: ICD-10-CM

## 2019-07-08 DIAGNOSIS — Z00.00 HEALTHCARE MAINTENANCE: ICD-10-CM

## 2019-07-08 DIAGNOSIS — F95.2 TOURETTE'S DISORDER: ICD-10-CM

## 2019-07-08 PROCEDURE — 84439 ASSAY OF FREE THYROXINE: CPT | Mod: ZL | Performed by: FAMILY MEDICINE

## 2019-07-08 PROCEDURE — 84443 ASSAY THYROID STIM HORMONE: CPT | Mod: ZL | Performed by: FAMILY MEDICINE

## 2019-07-08 PROCEDURE — 36415 COLL VENOUS BLD VENIPUNCTURE: CPT | Mod: ZL | Performed by: FAMILY MEDICINE

## 2019-07-08 RX ORDER — RISPERIDONE 0.25 MG/1
TABLET ORAL
Qty: 90 TABLET | Refills: 3 | Status: SHIPPED | OUTPATIENT
Start: 2019-07-08 | End: 2019-10-10

## 2019-07-08 RX ORDER — ASCORBIC ACID 500 MG
TABLET ORAL
Qty: 100 TABLET | Refills: 1 | Status: SHIPPED | OUTPATIENT
Start: 2019-07-08 | End: 2020-01-24

## 2019-07-08 RX ORDER — CHLORAL HYDRATE 500 MG
CAPSULE ORAL
Qty: 100 CAPSULE | Refills: 1 | Status: SHIPPED | OUTPATIENT
Start: 2019-07-08 | End: 2019-09-23

## 2019-07-09 LAB
T4 FREE SERPL-MCNC: 1.07 NG/DL (ref 0.76–1.46)
TSH SERPL DL<=0.005 MIU/L-ACNC: 0.06 MU/L (ref 0.4–4)

## 2019-08-05 DIAGNOSIS — Z00.00 HEALTHCARE MAINTENANCE: ICD-10-CM

## 2019-08-05 DIAGNOSIS — E78.5 HYPERLIPIDEMIA LDL GOAL <100: ICD-10-CM

## 2019-08-05 RX ORDER — SIMVASTATIN 10 MG
TABLET ORAL
Qty: 30 TABLET | Refills: 5 | Status: SHIPPED | OUTPATIENT
Start: 2019-08-05 | End: 2020-03-10

## 2019-08-05 RX ORDER — FERROUS SULFATE 325(65) MG
TABLET ORAL
Qty: 100 TABLET | Refills: 1 | Status: SHIPPED | OUTPATIENT
Start: 2019-08-05 | End: 2019-11-14

## 2019-08-09 DIAGNOSIS — F63.9 IMPULSE CONTROL DISEASE: ICD-10-CM

## 2019-08-09 RX ORDER — GUANFACINE 1 MG/1
0.5 TABLET ORAL 3 TIMES DAILY
Qty: 45 TABLET | Refills: 3 | Status: SHIPPED | OUTPATIENT
Start: 2019-08-09 | End: 2019-08-09

## 2019-08-09 RX ORDER — GUANFACINE 1 MG/1
0.5 TABLET ORAL 3 TIMES DAILY
Qty: 45 TABLET | Refills: 3 | Status: SHIPPED | OUTPATIENT
Start: 2019-08-09 | End: 2019-11-14

## 2019-08-09 NOTE — TELEPHONE ENCOUNTER
tenex  Last Written Prescription Date: 7/31/18  Last Fill Quantity: 45 # of Refills: 11  Last Office Visit: 6/20/19

## 2019-08-09 NOTE — TELEPHONE ENCOUNTER
Fax received from [facility] seeking more info regarding pt- address, phone #, etc. Per chart, pt is a [facility] pt and generally this facility uses [facility]. Refill request was likely sent to [facility] in error. LM with Manager of facility Christel Mullins at [phone] for clarification.

## 2019-08-09 NOTE — TELEPHONE ENCOUNTER
Christel called back, med to go to [facility]. Medication re-sent to correct pharmacy. [facility] updated.

## 2019-08-12 DIAGNOSIS — R13.10 DYSPHAGIA, UNSPECIFIED TYPE: ICD-10-CM

## 2019-08-12 DIAGNOSIS — E03.9 HYPOTHYROIDISM, UNSPECIFIED TYPE: ICD-10-CM

## 2019-08-13 RX ORDER — LEVOTHYROXINE SODIUM 150 UG/1
150 TABLET ORAL DAILY
Qty: 30 TABLET | Refills: 10 | Status: SHIPPED | OUTPATIENT
Start: 2019-08-13 | End: 2020-05-29

## 2019-09-16 NOTE — PROGRESS NOTES
[facility]  [address]  [place] [address]  [phone]  Dept: [phone]    PRE-OP EVALUATION:  Today's date: 2019    Aaliyah Dean (: 1970) presents for pre-operative evaluation assessment as requested by Dr. Foley.  She requires evaluation and anesthesia risk assessment prior to undergoing surgery/procedure for treatment of Cataracts on Left Eye which caused glaucoma.     Proposed Surgery/ Procedure: Cataract Extraction left eye  Date of Surgery/ Procedure: 10/10/19  Time of Surgery/ Procedure: [facility]  Hospital/Surgical Facility: [facility]  Fax number for surgical facility: [phone]  Primary Physician: Clarissa Santos  Type of Anesthesia Anticipated: to be determined    Patient has a Health Care Directive or Living Will:  NO    1. NO - Do you have a history of heart attack, stroke, stent, bypass or surgery on an artery in the head, neck, heart or legs?  2. NO - Do you ever have any pain or discomfort in your chest?  3. NO - Do you have a history of  Heart Failure?  4. NO - Are you troubled by shortness of breath when: walking on the level, up a slight hill or at night?  5. NO - Do you currently have a cold, bronchitis or other respiratory infection?  6. NO - Do you have a cough, shortness of breath or wheezing?  7. NO - Do you sometimes get pains in the calves of your legs when you walk?  8. NO - Do you or anyone in your family have previous history of blood clots?  9. NO - Do you or does anyone in your family have a serious bleeding problem such as prolonged bleeding following surgeries or cuts?  10. YES - HAVE YOU EVER HAD PROBLEMS WITH ANEMIA OR BEEN TOLD TO TAKE IRON PILLS? Daily iron supplement.   11. NO - Have you had any abnormal blood loss such as black, tarry or bloody stools, or abnormal vaginal bleeding?  12. NO - Have you ever had a blood transfusion?  13. NO - Have you or any of your relatives ever had problems with  anesthesia?  14. NO - Do you have sleep apnea, excessive snoring or daytime drowsiness?  15. NO - Do you have any prosthetic heart valves?  16. NO - Do you have prosthetic joints?  17. NO - Is there any chance that you may be pregnant?      HPI:     HPI related to upcoming procedure: left eye cataract; the decision has been made to proceed with surgical correction.       All chronic conditions are stable per group home staff.        MEDICAL HISTORY:     Patient Active Problem List    Diagnosis Date Noted     Anxiety 06/20/2019     Priority: Medium     ACP (advance care planning) 06/12/2018     Priority: Medium     Allergic rhinitis 11/15/2010     Priority: Medium     Other acne 11/15/2010     Priority: Medium     Impulse control disorder 11/15/2010     Priority: Medium     Dysphagia 11/13/2007     Priority: Medium     Convulsions (H) 05/16/2007     Priority: Medium     Disturbance of conduct 01/05/2006     Priority: Medium     Down's syndrome 06/03/2002     Priority: Medium     Hyperlipidemia 05/09/2002     Priority: Medium     Hypothyroidism 03/21/2001     Priority: Medium      Past Medical History:   Diagnosis Date     Allergic rhinitis, cause unspecified 11/15/2010     Down's syndrome 06/03/2002     DYSPHAGIA NOS 11/13/2007     Impulse control disorder, unspecified 11/15/2010     Other acne 11/15/2010     Other and unspecified hyperlipidemia 05/09/2002     Other convulsions 05/16/2007     Unspecified acquired hypothyroidism 03/21/2001     Unspecified disturbance of conduct 01/05/2006     Past Surgical History:   Procedure Laterality Date     CHOLECYSTECTOMY  01/2011     ENT SURGERY       EXAM UNDER ANESTHESIA PELVIC N/A 5/4/2016    Procedure: EXAM UNDER ANESTHESIA PELVIC;  Surgeon: Valentin Ferris MD;  Location: [facility]     GENITOURINARY SURGERY      INTERNAL URETHROTOMY     HERNIA REPAIR  march 4 2015    ventral herniorrhaphy with intraperitoneal sepramesh     ileostomy take-down  2011     illeostomy  04/25/2011      ventilation tubes, bilateral       Current Outpatient Medications   Medication Sig Dispense Refill     Calcium Carb-Cholecalciferol (CALCIUM-VITAMIN D3) 250-125 MG-UNIT TABS TAKE ONE (1) TABLET BY MOUTH TWICE DAILY WITH MEALS 100 tablet 5     chlorhexidine (PERIDEX) 0.12 % solution SWAB ON TEETH AND GUMS FOR 30 SECONDS TWICE A  mL 11     citalopram (CELEXA) 20 MG tablet Take 1 tablet (20 mg) by mouth daily 30 tablet 5     FEROSUL 325 (65 Fe) MG tablet TAKE ONE (1) TABLET BY MOUTH TWICE DAILY 100 tablet 1     fish oil-omega-3 fatty acids 1000 MG capsule TAKE ONE CAPSULE BY MOUTH THREE TIMES DAILY 100 capsule 1     fluticasone (FLONASE) 50 MCG/ACT nasal spray INSTILL 2 SPRAYS INTO BOTH NOSTRILS DAILY SHAKE GENTLY 16 g 10     guanFACINE (TENEX) 1 MG tablet Take 0.5 tablets (0.5 mg) by mouth 3 times daily 45 tablet 3     ibuprofen (ADVIL,MOTRIN) 400 MG tablet Take 1 tablet by mouth every 4-6 hours as needed 120 tablet 2     JOLESSA 0.15-0.03 MG tablet TAKE ONE (1) TABLET BY MOUTH DAILY QUASENSE/JOLESSA 91 tablet 1     levothyroxine (SYNTHROID/LEVOTHROID) 150 MCG tablet Take 1 tablet (150 mcg) by mouth daily 30 tablet 10     loratadine (CLARITIN) 10 MG tablet Take 1 tablet (10 mg) by mouth daily 30 tablet 10     omeprazole (PRILOSEC) 20 MG DR capsule TAKE ONE (1) CAPSULE BY MOUTH DAILY 90 capsule 1     risperiDONE (RISPERDAL) 0.25 MG tablet TAKE ONE (1) TABLET BY MOUTH THREE TIMES DAILY 90 tablet 3     Simethicone (GAS-X EXTRA STRENGTH) 125 MG CAPS Take 1-2 capsules by mouth 2 times daily as needed 30 capsule 1     simvastatin (ZOCOR) 10 MG tablet TAKE ONE (1) TABLET BY MOUTH DAILY 30 tablet 5     Skin Protectants, Misc. (EUCERIN) cream        triamcinolone (KENALOG) 0.1 % cream Apply sparingly to affected area twice daily as needed 80 g 1     vitamin C (ASCORBIC ACID) 500 MG tablet TAKE ONE (1) TABLET BY MOUTH DAILY 100 tablet 1     OTC products: None, except as noted above, no recent use of OTC ASA, NSAIDS  or Steroids and no use of herbal medications or other supplements    Allergies   Allergen Reactions     Brimonidine Other (See Comments)     Pain, swelling and erythema     Amoxicillin      Cephalexin Monohydrate      Keflex        Latex Allergy: NO    Social History     Tobacco Use     Smoking status: Never Smoker     Smokeless tobacco: Never Used     Tobacco comment: no passive exposure   Substance Use Topics     Alcohol use: No     History   Drug Use No       REVIEW OF SYSTEMS: from group home staff   CONSTITUTIONAL: NEGATIVE for fever, chills, change in weight  INTEGUMENTARY/SKIN: NEGATIVE for worrisome rashes, moles or lesions  EYES: as above  ENT/MOUTH: NEGATIVE for ear, mouth and throat problems  RESP: NEGATIVE for significant cough or SOB  BREAST: NEGATIVE for masses, tenderness or discharge  CV: NEGATIVE for chest pain, palpitations or peripheral edema  GI: NEGATIVE for nausea, abdominal pain, heartburn, or change in bowel habits  : NEGATIVE for frequency, dysuria, or hematuria  MUSCULOSKELETAL: NEGATIVE for significant arthralgias or myalgia  NEURO: NEGATIVE for weakness, dizziness or paresthesias  ENDOCRINE: NEGATIVE for temperature intolerance, skin/hair changes  HEME: NEGATIVE for bleeding problems  PSYCHIATRIC: NEGATIVE for changes in mood or affect    EXAM:   /68 (BP Location: Right arm, Patient Position: Sitting, Cuff Size: Adult Regular)   Pulse 58   Temp 97.6  F (36.4  C) (Tympanic)   Resp 18   Wt 48.4 kg (106 lb 11.2 oz)   SpO2 97%   BMI 25.97 kg/m      GENERAL APPEARANCE: downs features, hitting the side of her head with left hand twice during our visit.       HENT: ear canals and TM's normal and nose and mouth without ulcers or lesions     NECK: no adenopathy, no asymmetry, masses, or scars and thyroid normal to palpation     RESP: lungs clear to auscultation - no rales, rhonchi or wheezes     CV: regular rates and rhythm, normal S1 S2, no S3 or S4 and no murmur, click or rub      ABDOMEN:  soft, nontender, no HSM or masses and bowel sounds normal     MS: extremities normal- no gross deformities noted, no evidence of inflammation in joints    Exam limited as she does not like to be touched    DIAGNOSTICS:     Results for orders placed or performed in visit on 09/17/19   CBC with platelets differential   Result Value Ref Range    WBC 9.2 4.0 - 11.0 10e9/L    RBC Count 3.98 3.8 - 5.2 10e12/L    Hemoglobin 13.6 11.7 - 15.7 g/dL    Hematocrit 41.9 35.0 - 47.0 %     (H) 78 - 100 fl    MCH 34.2 (H) 26.5 - 33.0 pg    MCHC 32.5 31.5 - 36.5 g/dL    RDW 13.3 10.0 - 15.0 %    Platelet Count 227 150 - 450 10e9/L    % Neutrophils 73.1 %    % Lymphocytes 20.4 %    % Monocytes 5.7 %    % Eosinophils 0.4 %    % Basophils 0.4 %    Absolute Neutrophil 6.7 1.6 - 8.3 10e9/L    Absolute Lymphocytes 1.9 0.8 - 5.3 10e9/L    Absolute Monocytes 0.5 0.0 - 1.3 10e9/L    Absolute Eosinophils 0.0 0.0 - 0.7 10e9/L    Absolute Basophils 0.0 0.0 - 0.2 10e9/L    Diff Method Automated Method    Basic metabolic panel   Result Value Ref Range    Sodium 140 133 - 144 mmol/L    Potassium 4.9 3.4 - 5.3 mmol/L    Chloride 105 94 - 109 mmol/L    Carbon Dioxide 29 20 - 32 mmol/L    Anion Gap 6 3 - 14 mmol/L    Glucose 85 70 - 99 mg/dL    Urea Nitrogen 16 7 - 30 mg/dL    Creatinine 0.92 0.52 - 1.04 mg/dL    GFR Estimate 74 >60 mL/min/[1.73_m2]    GFR Estimate If Black 86 >60 mL/min/[1.73_m2]    Calcium 9.1 8.5 - 10.1 mg/dL         Recent Labs   Lab Test 05/20/19  1007 11/19/18  1007 05/18/18  0915   HGB 13.4  --  14.1     --  204    137 139   POTASSIUM 4.9 4.1 4.6   CR 0.92 0.94 0.92           EKG Interpretation:      Interpreted by Zulma Chaudhry NP    Symptoms at time of EKG: None   Rhythm: Normal sinus   Rate: Normal  Axis: Normal  Ectopy: None  Conduction: Normal  ST Segments/ T Waves: No ST-T wave changes and No acute ischemic changes  Q Waves: None  Comparison to prior: No old EKG  available    Clinical Impression: normal EKG        IMPRESSION:   Reason for surgery/procedure: cataract left eye  Diagnosis/reason for consult: anesthesia risk assessment     The proposed surgical procedure is considered LOW risk.    REVISED CARDIAC RISK INDEX  The patient has the following serious cardiovascular risks for perioperative complications such as (MI, PE, VFib and 3  AV Block):  No serious cardiac risks  INTERPRETATION: 0 risks: Class I (very low risk - 0.4% complication rate)    The patient has the following additional risks for perioperative complications:  The ASCVD Risk score (Broadwayalejandra FONSECA Jr., et al., 2013) failed to calculate for the following reasons:    The valid total cholesterol range is 130 to 320 mg/dL  Mental status      ICD-10-CM    1. Preop general physical exam Z01.818 CBC with platelets differential     Basic metabolic panel     EKG 12-lead complete w/read - (Clinic Performed)     CANCELED: CBC with platelets   2. Cataract of left eye, unspecified cataract type H26.9    3. Impulse control disorder F63.9    4. Disturbance of conduct F91.9    5. Down's syndrome Q90.9    6. Acquired hypothyroidism E03.9    7. Convulsions, unspecified convulsion type (H) R56.9        RECOMMENDATIONS:       Cardiovascular Risk  Performs 4 METs exercise without symptoms (Climb a flight of stairs) .       --Patient is to HOLD all scheduled medications on the day of surgery EXCEPT for modifications listed below.    APPROVAL GIVEN to proceed with proposed procedure, without further diagnostic evaluation       Signed Electronically by: Zulma Chaudhry NP    Copy of this evaluation report is provided to requesting physician.    [facility] Preop Guidelines    Revised Cardiac Risk Index

## 2019-09-16 NOTE — PATIENT INSTRUCTIONS
1. Preop general physical exam  - CBC with platelets differential  - Basic metabolic panel  - EKG 12-lead complete w/read - (Clinic Performed)    2. Cataract of left eye, unspecified cataract type    Before Your Surgery      Call your surgeon if there is any change in your health. This includes signs of a cold or flu (such as a sore throat, runny nose, cough, rash or fever).    Do not smoke, drink alcohol or take over the counter medicine (unless your surgeon or primary care doctor tells you to) for the 24 hours before and after surgery.    If you take prescribed drugs: Follow your doctor s orders about which medicines to take and which to stop until after surgery.    Eating and drinking prior to surgery: follow the instructions from your surgeon    Take a shower or bath the night before surgery. Use the soap your surgeon gave you to gently clean your skin. If you do not have soap from your surgeon, use your regular soap. Do not shave or scrub the surgery site.  Wear clean pajamas and have clean sheets on your bed.

## 2019-09-17 ENCOUNTER — OFFICE VISIT (OUTPATIENT)
Dept: FAMILY MEDICINE | Facility: OTHER | Age: 49
End: 2019-09-17
Attending: NURSE PRACTITIONER
Payer: MEDICARE

## 2019-09-17 VITALS
OXYGEN SATURATION: 97 % | SYSTOLIC BLOOD PRESSURE: 118 MMHG | TEMPERATURE: 97.6 F | WEIGHT: 106.7 LBS | DIASTOLIC BLOOD PRESSURE: 68 MMHG | HEART RATE: 58 BPM | RESPIRATION RATE: 18 BRPM | BODY MASS INDEX: 25.97 KG/M2

## 2019-09-17 DIAGNOSIS — Q90.9 DOWN'S SYNDROME: ICD-10-CM

## 2019-09-17 DIAGNOSIS — E03.9 ACQUIRED HYPOTHYROIDISM: ICD-10-CM

## 2019-09-17 DIAGNOSIS — F63.9 IMPULSE CONTROL DISORDER: ICD-10-CM

## 2019-09-17 DIAGNOSIS — Z01.818 PREOP GENERAL PHYSICAL EXAM: Primary | ICD-10-CM

## 2019-09-17 DIAGNOSIS — H26.9 CATARACT OF LEFT EYE, UNSPECIFIED CATARACT TYPE: ICD-10-CM

## 2019-09-17 DIAGNOSIS — R56.9 CONVULSIONS, UNSPECIFIED CONVULSION TYPE (H): ICD-10-CM

## 2019-09-17 DIAGNOSIS — F91.9 DISTURBANCE OF CONDUCT: ICD-10-CM

## 2019-09-17 LAB
BASOPHILS # BLD AUTO: 0 10E9/L (ref 0–0.2)
BASOPHILS NFR BLD AUTO: 0.4 %
DIFFERENTIAL METHOD BLD: ABNORMAL
EOSINOPHIL # BLD AUTO: 0 10E9/L (ref 0–0.7)
EOSINOPHIL NFR BLD AUTO: 0.4 %
ERYTHROCYTE [DISTWIDTH] IN BLOOD BY AUTOMATED COUNT: 13.3 % (ref 10–15)
HCT VFR BLD AUTO: 41.9 % (ref 35–47)
HGB BLD-MCNC: 13.6 G/DL (ref 11.7–15.7)
LYMPHOCYTES # BLD AUTO: 1.9 10E9/L (ref 0.8–5.3)
LYMPHOCYTES NFR BLD AUTO: 20.4 %
MCH RBC QN AUTO: 34.2 PG (ref 26.5–33)
MCHC RBC AUTO-ENTMCNC: 32.5 G/DL (ref 31.5–36.5)
MCV RBC AUTO: 105 FL (ref 78–100)
MONOCYTES # BLD AUTO: 0.5 10E9/L (ref 0–1.3)
MONOCYTES NFR BLD AUTO: 5.7 %
NEUTROPHILS # BLD AUTO: 6.7 10E9/L (ref 1.6–8.3)
NEUTROPHILS NFR BLD AUTO: 73.1 %
PLATELET # BLD AUTO: 227 10E9/L (ref 150–450)
RBC # BLD AUTO: 3.98 10E12/L (ref 3.8–5.2)
WBC # BLD AUTO: 9.2 10E9/L (ref 4–11)

## 2019-09-17 PROCEDURE — 80048 BASIC METABOLIC PNL TOTAL CA: CPT | Mod: ZL | Performed by: NURSE PRACTITIONER

## 2019-09-17 PROCEDURE — 99214 OFFICE O/P EST MOD 30 MIN: CPT | Performed by: NURSE PRACTITIONER

## 2019-09-17 PROCEDURE — 85025 COMPLETE CBC W/AUTO DIFF WBC: CPT | Mod: ZL | Performed by: NURSE PRACTITIONER

## 2019-09-17 PROCEDURE — 93010 ELECTROCARDIOGRAM REPORT: CPT | Performed by: INTERNAL MEDICINE

## 2019-09-17 PROCEDURE — G0463 HOSPITAL OUTPT CLINIC VISIT: HCPCS | Mod: 25

## 2019-09-17 PROCEDURE — G0463 HOSPITAL OUTPT CLINIC VISIT: HCPCS

## 2019-09-17 PROCEDURE — 36415 COLL VENOUS BLD VENIPUNCTURE: CPT | Mod: ZL | Performed by: NURSE PRACTITIONER

## 2019-09-17 PROCEDURE — 93005 ELECTROCARDIOGRAM TRACING: CPT

## 2019-09-17 RX ORDER — LEVONORGESTREL / ETHINYL ESTRADIOL 0.15-0.03
KIT ORAL
Qty: 91 TABLET | Refills: 1 | Status: SHIPPED | OUTPATIENT
Start: 2019-09-17 | End: 2020-03-19

## 2019-09-17 ASSESSMENT — PAIN SCALES - GENERAL: PAINLEVEL: NO PAIN (0)

## 2019-09-17 NOTE — NURSING NOTE
"Chief Complaint   Patient presents with     Pre-Op Exam       Initial /68 (BP Location: Right arm, Patient Position: Sitting, Cuff Size: Adult Regular)   Pulse 58   Temp 97.6  F (36.4  C) (Tympanic)   Resp 18   Wt 48.4 kg (106 lb 11.2 oz)   SpO2 97%   BMI 25.97 kg/m   Estimated body mass index is 25.97 kg/m  as calculated from the following:    Height as of 5/20/19: 1.365 m (4' 5.75\").    Weight as of this encounter: 48.4 kg (106 lb 11.2 oz).  Medication Reconciliation: complete  Shayla Marquez LPN  "

## 2019-09-18 LAB
ANION GAP SERPL CALCULATED.3IONS-SCNC: 6 MMOL/L (ref 3–14)
BUN SERPL-MCNC: 16 MG/DL (ref 7–30)
CALCIUM SERPL-MCNC: 9.1 MG/DL (ref 8.5–10.1)
CHLORIDE SERPL-SCNC: 105 MMOL/L (ref 94–109)
CO2 SERPL-SCNC: 29 MMOL/L (ref 20–32)
CREAT SERPL-MCNC: 0.92 MG/DL (ref 0.52–1.04)
GFR SERPL CREATININE-BSD FRML MDRD: 74 ML/MIN/{1.73_M2}
GLUCOSE SERPL-MCNC: 85 MG/DL (ref 70–99)
POTASSIUM SERPL-SCNC: 4.9 MMOL/L (ref 3.4–5.3)
SODIUM SERPL-SCNC: 140 MMOL/L (ref 133–144)

## 2019-09-23 DIAGNOSIS — Z00.00 HEALTHCARE MAINTENANCE: ICD-10-CM

## 2019-09-25 RX ORDER — CHLORAL HYDRATE 500 MG
CAPSULE ORAL
Qty: 100 CAPSULE | Refills: 1 | Status: SHIPPED | OUTPATIENT
Start: 2019-09-25 | End: 2020-07-01

## 2019-11-07 ENCOUNTER — TELEPHONE (OUTPATIENT)
Dept: FAMILY MEDICINE | Facility: OTHER | Age: 49
End: 2019-11-07

## 2019-11-07 NOTE — TELEPHONE ENCOUNTER
8:09 AM    Reason for Call: OVERBOOK    Patient is having the following symptoms:med check //  left cheek bone open wound from hitting herself in the face for 1 weeks.    The patient is requesting an appointment for ASAP with .    Was an appointment offered for this call? Yes  If yes : Appointment type              Date 11/21/19 does not want to wait this long     Preferred method for responding to this message: Telephone Call  What is your phone number ?[phone]    If we cannot reach you directly, may we leave a detailed response at the number you provided? Yes    Can this message wait until your PCP/provider returns, if unavailable today? Not applicable, pcp is in     Alison Quiles

## 2019-11-11 ENCOUNTER — OFFICE VISIT (OUTPATIENT)
Dept: FAMILY MEDICINE | Facility: OTHER | Age: 49
End: 2019-11-11
Attending: FAMILY MEDICINE
Payer: MEDICARE

## 2019-11-11 VITALS
WEIGHT: 106.2 LBS | DIASTOLIC BLOOD PRESSURE: 62 MMHG | OXYGEN SATURATION: 98 % | BODY MASS INDEX: 25.84 KG/M2 | TEMPERATURE: 98 F | SYSTOLIC BLOOD PRESSURE: 116 MMHG | HEART RATE: 60 BPM

## 2019-11-11 DIAGNOSIS — F91.9 DISTURBANCE OF CONDUCT: ICD-10-CM

## 2019-11-11 DIAGNOSIS — Q90.9 DOWN'S SYNDROME: Primary | ICD-10-CM

## 2019-11-11 DIAGNOSIS — F43.21 GRIEF REACTION: ICD-10-CM

## 2019-11-11 PROCEDURE — G0463 HOSPITAL OUTPT CLINIC VISIT: HCPCS

## 2019-11-11 PROCEDURE — 99215 OFFICE O/P EST HI 40 MIN: CPT | Performed by: FAMILY MEDICINE

## 2019-11-11 RX ORDER — CITALOPRAM HYDROBROMIDE 10 MG/1
10 TABLET ORAL DAILY
Qty: 7 TABLET | Refills: 0 | Status: SHIPPED | OUTPATIENT
Start: 2019-11-11 | End: 2019-12-12

## 2019-11-11 ASSESSMENT — PAIN SCALES - GENERAL: PAINLEVEL: NO PAIN (0)

## 2019-11-11 NOTE — NURSING NOTE
"Chief Complaint   Patient presents with     Behavioral Problem       Initial /62 (BP Location: Right arm, Patient Position: Sitting, Cuff Size: Adult Regular)   Pulse 60   Temp 98  F (36.7  C) (Tympanic)   Wt 48.2 kg (106 lb 3.2 oz)   SpO2 98%   BMI 25.84 kg/m   Estimated body mass index is 25.84 kg/m  as calculated from the following:    Height as of 5/20/19: 1.365 m (4' 5.75\").    Weight as of this encounter: 48.2 kg (106 lb 3.2 oz).  Medication Reconciliation: complete  Ashley A. Lechevalier, LPN  "

## 2019-11-11 NOTE — PROGRESS NOTES
Subjective     Aaliyah Dean is a 49 year old female who presents to clinic today for the following health issues:    HPI   Behavioral Issues      Duration: ongoing issue    Description (location/character/radiation): Self abuse and aggression towards others    Intensity:  moderate    Accompanying signs and symptoms: none    History (similar episodes/previous evaluation): ongoing    Precipitating or alleviating factors: None    Therapies tried and outcome: medications, behavioral protocols    Notes from patient's sister reviewed.  Staff with patient disagree that inpatient treatment is needed, they feel [facility] wouldn't do well with that drastic of a change in setting at all.  Change is usually will trigger behaviors.  Staff note that patient had a similar backslide about 10 years ago, behaviors were worse than they are now, and patient recovered and did well.  Staff does agree, though, that patient could benefit from evaluation and possible medication adjustments.  They have not found the Celexa to be helpful and are in agreement of weaning off.         Patient Active Problem List   Diagnosis     Down's syndrome     Hypothyroidism     Hyperlipidemia     Disturbance of conduct     Convulsions (H)     Dysphagia     Allergic rhinitis     Other acne     Impulse control disorder     ACP (advance care planning)     Anxiety     Past Surgical History:   Procedure Laterality Date     CHOLECYSTECTOMY  01/2011     ENT SURGERY       EXAM UNDER ANESTHESIA PELVIC N/A 5/4/2016    Procedure: EXAM UNDER ANESTHESIA PELVIC;  Surgeon: Valentin Ferris MD;  Location: [facility]     GENITOURINARY SURGERY      INTERNAL URETHROTOMY     HERNIA REPAIR  march 4 2015    ventral herniorrhaphy with intraperitoneal sepramesh     ileostomy take-down  2011     illeostomy  04/25/2011     ventilation tubes, bilateral         Social History     Tobacco Use     Smoking status: Never Smoker     Smokeless tobacco: Never Used     Tobacco comment: no passive  exposure   Substance Use Topics     Alcohol use: No     Family History   Problem Relation Age of Onset     Cancer Mother         cause of death     Diabetes No family hx of          Current Outpatient Medications   Medication Sig Dispense Refill     Calcium Carb-Cholecalciferol (CALCIUM-VITAMIN D3) 250-125 MG-UNIT TABS TAKE ONE (1) TABLET BY MOUTH TWICE DAILY WITH MEALS 100 tablet 5     chlorhexidine (PERIDEX) 0.12 % solution SWAB ON TEETH AND GUMS FOR 30 SECONDS TWICE A  mL 11     citalopram (CELEXA) 10 MG tablet Take 1 tablet (10 mg) by mouth daily for 7 days 7 tablet 0     FEROSUL 325 (65 Fe) MG tablet TAKE ONE (1) TABLET BY MOUTH TWICE DAILY 100 tablet 1     fish oil-omega-3 fatty acids 1000 MG capsule TAKE ONE CAPSULE BY MOUTH THREE TIMES DAILY 100 capsule 1     fluticasone (FLONASE) 50 MCG/ACT nasal spray INSTILL 2 SPRAYS INTO BOTH NOSTRILS DAILY SHAKE GENTLY 16 g 10     guanFACINE (TENEX) 1 MG tablet Take 0.5 tablets (0.5 mg) by mouth 3 times daily 45 tablet 3     ibuprofen (ADVIL,MOTRIN) 400 MG tablet Take 1 tablet by mouth every 4-6 hours as needed 120 tablet 2     JOLESSA 0.15-0.03 MG tablet TAKE ONE (1) TABLET BY MOUTH DAILY QUASENSE/JOLESSA 91 tablet 1     levothyroxine (SYNTHROID/LEVOTHROID) 150 MCG tablet Take 1 tablet (150 mcg) by mouth daily 30 tablet 10     loratadine (CLARITIN) 10 MG tablet Take 1 tablet (10 mg) by mouth daily 30 tablet 10     omeprazole (PRILOSEC) 20 MG DR capsule TAKE ONE (1) CAPSULE BY MOUTH DAILY 90 capsule 1     risperiDONE (RISPERDAL) 0.25 MG tablet TAKE ONE (1) TABLET BY MOUTH THREE TIMES DAILY 90 tablet 0     Simethicone (GAS-X EXTRA STRENGTH) 125 MG CAPS Take 1-2 capsules by mouth 2 times daily as needed 30 capsule 1     simvastatin (ZOCOR) 10 MG tablet TAKE ONE (1) TABLET BY MOUTH DAILY 30 tablet 5     Skin Protectants, Misc. (EUCERIN) cream        triamcinolone (KENALOG) 0.1 % cream Apply sparingly to affected area twice daily as needed 80 g 1     vitamin C  (ASCORBIC ACID) 500 MG tablet TAKE ONE (1) TABLET BY MOUTH DAILY 100 tablet 1     Allergies   Allergen Reactions     Brimonidine Other (See Comments)     Pain, swelling and erythema     Amoxicillin      Cephalexin Monohydrate      Keflex           Reviewed and updated as needed this visit by Provider         Review of Systems   ROS COMP: Constitutional, HEENT, cardiovascular, pulmonary, gi and gu systems are negative, except as otherwise noted.      Objective    /62 (BP Location: Right arm, Patient Position: Sitting, Cuff Size: Adult Regular)   Pulse 60   Temp 98  F (36.7  C) (Tympanic)   Wt 48.2 kg (106 lb 3.2 oz)   SpO2 98%   BMI 25.84 kg/m    Body mass index is 25.84 kg/m .  Physical Exam   GENERAL: alert and no distress  SKIN: small open wound along left facial cheek, no signs of infection  PSYCH: affect normal/bright; at one point patient does gently hit her cheek a few times, but stops when asked and is easily redirectable    Diagnostic Test Results:  none         Assessment & Plan     1. Down's syndrome  I discussed the guardian's wishes for possible inpatient management with Care Coordination Nurse and  - patient does not meet [facility] criteria for inpatient management and staff at [facility] state that Doctor to Doctor discussion will not change that.  She could possibly qualify for partial hospitalization and [facility] referral.  After discussion with Care Coordination staff and Residence Staff, we will start with outpatient referral to [facility] as a starting point.  Follow-up here in about 1-2 months for medication review after appointments kept.  - MENTAL HEALTH REFERRAL  - Adult; Psychiatry and Medication Management; Psychiatry; Other: Not Listed - Enter Referral Details in Scheduling Comments Below; Patient call to schedule    2. Grief reaction  Will wean off Celexa, schedule written for staff.  Follow-up as above.  - citalopram (CELEXA) 10 MG  "tablet; Take 1 tablet (10 mg) by mouth daily for 7 days  Dispense: 7 tablet; Refill: 0  - MENTAL HEALTH REFERRAL  - Adult; Psychiatry and Medication Management; Psychiatry; Other: Not Listed - Enter Referral Details in Scheduling Comments Below; Patient call to schedule    3. Disturbance of conduct  As above.  - MENTAL HEALTH REFERRAL  - Adult; Psychiatry and Medication Management; Psychiatry; Other: Not Listed - Enter Referral Details in Scheduling Comments Below; Patient call to schedule     BMI:   Estimated body mass index is 25.84 kg/m  as calculated from the following:    Height as of 5/20/19: 1.365 m (4' 5.75\").    Weight as of this encounter: 48.2 kg (106 lb 3.2 oz).       Over 45 minutes are spent with the patient and her staff, over 30 minutes of which was in education and counseling regarding current conditions and treatment/therapy options/risks/benefits/etc, including possible treatments and facilities discussion with Care Coordination, discussion of current symptoms with staff, review of guardian's requests, discussion of recommended treatment plan, and follow-up planning.      Return in about 2 months (around 1/11/2020) for Medication review.    Clarissa Santos MD  [facility]      "

## 2019-12-09 NOTE — PROGRESS NOTES
Subjective     Aaliyah Dean is a 49 year old female who presents to clinic today for the following health issues:    HPI   Hyperlipidemia Follow-Up      Are you regularly taking any medication or supplement to lower your cholesterol?   Yes- Zocor    Are you having muscle aches or other side effects that you think could be caused by your cholesterol lowering medication?  No    Hypothyroidism Follow-up      Since last visit, patient describes the following symptoms: Weight stable, no hair loss, no skin changes, no constipation, no loose stools      How many servings of fruits and vegetables do you eat daily?  2-3    On average, how many sweetened beverages do you drink each day (Examples: soda, juice, sweet tea, etc.  Do NOT count diet or artificially sweetened beverages)?   0    How many days per week do you miss taking your medication? 0    Depression and Anxiety Follow-Up/ Behavioral changes    How are you doing with your depression since your last visit? Improved since stopping Celexa    How are you doing with your anxiety since your last visit?  Improved since stopping celexa    Are you having other symptoms that might be associated with depression or anxiety? Yes:  behavioral and memory changes    Have you had a significant life event? Grief or Loss     Do you have any concerns with your use of alcohol or other drugs? No     Patient's sisters are concerned about behavioral changes.  They requested Genesight testing and a referral to Dr. Childers, Psychiatry.    Social History     Tobacco Use     Smoking status: Never Smoker     Smokeless tobacco: Never Used     Tobacco comment: no passive exposure   Substance Use Topics     Alcohol use: No     Drug use: No     No flowsheet data found.  No flowsheet data found.      Suicide Assessment Five-step Evaluation and Treatment (SAFE-T)      Patient Active Problem List   Diagnosis     Down's syndrome     Hypothyroidism     Hyperlipidemia     Disturbance of conduct      Convulsions (H)     Dysphagia     Allergic rhinitis     Other acne     Impulse control disorder     ACP (advance care planning)     Anxiety     Past Surgical History:   Procedure Laterality Date     CHOLECYSTECTOMY  01/2011     ENT SURGERY       EXAM UNDER ANESTHESIA PELVIC N/A 5/4/2016    Procedure: EXAM UNDER ANESTHESIA PELVIC;  Surgeon: Valentin Ferris MD;  Location: [facility]     GENITOURINARY SURGERY      INTERNAL URETHROTOMY     HERNIA REPAIR  march 4 2015    ventral herniorrhaphy with intraperitoneal sepramesh     ileostomy take-down  2011     illeostomy  04/25/2011     ventilation tubes, bilateral         Social History     Tobacco Use     Smoking status: Never Smoker     Smokeless tobacco: Never Used     Tobacco comment: no passive exposure   Substance Use Topics     Alcohol use: No     Family History   Problem Relation Age of Onset     Cancer Mother         cause of death     Diabetes No family hx of          Current Outpatient Medications   Medication Sig Dispense Refill     Calcium Carb-Cholecalciferol (CALCIUM-VITAMIN D3) 250-125 MG-UNIT TABS TAKE ONE (1) TABLET BY MOUTH TWICE DAILY WITH MEALS 100 tablet 5     chlorhexidine (PERIDEX) 0.12 % solution SWAB ON TEETH AND GUMS FOR 30 SECONDS TWICE A  mL 11     fish oil-omega-3 fatty acids 1000 MG capsule TAKE ONE CAPSULE BY MOUTH THREE TIMES DAILY 100 capsule 1     fluticasone (FLONASE) 50 MCG/ACT nasal spray INSTILL 2 SPRAYS INTO BOTH NOSTRILS DAILY SHAKE GENTLY 16 g 10     GNP IRON 200 (65 Fe) MG TABS TAKE ONE (1) TABLET BY MOUTH TWICE DAILY 100 tablet 1     guanFACINE (TENEX) 1 MG tablet Take 0.5 tablets (0.5 mg) by mouth 3 times daily 45 tablet 3     ibuprofen (ADVIL,MOTRIN) 400 MG tablet Take 1 tablet by mouth every 4-6 hours as needed 120 tablet 2     JOLESSA 0.15-0.03 MG tablet TAKE ONE (1) TABLET BY MOUTH DAILY QUASENSE/JOLESSA 91 tablet 1     levothyroxine (SYNTHROID/LEVOTHROID) 150 MCG tablet Take 1 tablet (150 mcg) by mouth daily 30 tablet  10     loratadine (CLARITIN) 10 MG tablet Take 1 tablet (10 mg) by mouth daily 30 tablet 10     omeprazole (PRILOSEC) 20 MG DR capsule TAKE ONE (1) CAPSULE BY MOUTH DAILY 90 capsule 1     risperiDONE (RISPERDAL) 0.25 MG tablet TAKE ONE (1) TABLET BY MOUTH THREE TIMES DAILY 90 tablet 0     Simethicone (GAS-X EXTRA STRENGTH) 125 MG CAPS Take 1-2 capsules by mouth 2 times daily as needed 30 capsule 1     simvastatin (ZOCOR) 10 MG tablet TAKE ONE (1) TABLET BY MOUTH DAILY 30 tablet 5     Skin Protectants, Misc. (EUCERIN) cream        triamcinolone (KENALOG) 0.1 % cream Apply sparingly to affected area twice daily as needed 80 g 1     vitamin C (ASCORBIC ACID) 500 MG tablet TAKE ONE (1) TABLET BY MOUTH DAILY 100 tablet 1     Allergies   Allergen Reactions     Brimonidine Other (See Comments)     Pain, swelling and erythema     Amoxicillin      Cephalexin Monohydrate      Keflex           Reviewed and updated as needed this visit by Provider  Tobacco  Allergies  Meds  Problems  Med Hx  Surg Hx  Fam Hx         Review of Systems   ROS COMP: Constitutional, HEENT, cardiovascular, pulmonary, gi and gu systems are negative, except as otherwise noted.      Objective    /62 (BP Location: Right arm, Patient Position: Sitting, Cuff Size: Adult Regular)   Pulse 69   Temp 97.4  F (36.3  C) (Tympanic)   Resp 16   Wt 48.1 kg (106 lb)   SpO2 98%   BMI 25.80 kg/m    Body mass index is 25.8 kg/m .  Physical Exam   GENERAL: alert and no distress  PSYCH: affect normal/bright    Diagnostic Test Results:  Updated        Assessment & Plan     1. Hyperlipidemia, unspecified hyperlipidemia type  Labs updated.  - Lipid Profile  - ALT  - CBC with platelets    2. Acquired hypothyroidism  Labs updated.  - TSH with free T4 reflex    3. Disturbance of conduct  Will order testing and referral as requested.  - Genesight testing: Laboratory Miscellaneous Order  - MENTAL HEALTH REFERRAL  - Adult; Psychiatry and Medication Management;  "Psychiatry; Range: [facility] [phone]; We will contact you to schedule the appointment or please call with any questions  - Send outs misc test  - T4 free  - T4 free    4. Impulse control disorder  As above.  - Genesight testing: Laboratory Miscellaneous Order  - MENTAL HEALTH REFERRAL  - Adult; Psychiatry and Medication Management; Psychiatry; Range: [facility] [phone]; We will contact you to schedule the appointment or please call with any questions    5. Mild major depression (H)  As above.    6. Anxiety  As above.  - Genesight testing: Laboratory Miscellaneous Order  - MENTAL HEALTH REFERRAL  - Adult; Psychiatry and Medication Management; Psychiatry; Range: [facility] [phone]; We will contact you to schedule the appointment or please call with any questions    7. Down's syndrome  As above  - Genesight testing: Laboratory Miscellaneous Order  - MENTAL HEALTH REFERRAL  - Adult; Psychiatry and Medication Management; Psychiatry; Range: [facility] [phone]; We will contact you to schedule the appointment or please call with any questions     BMI:   Estimated body mass index is 25.8 kg/m  as calculated from the following:    Height as of 5/20/19: 1.365 m (4' 5.75\").    Weight as of this encounter: 48.1 kg (106 lb).           Return in about 6 months (around 6/12/2020) for Physical Exam.    Clarissa Santos MD  [facility]      "

## 2019-12-12 ENCOUNTER — OFFICE VISIT (OUTPATIENT)
Dept: FAMILY MEDICINE | Facility: OTHER | Age: 49
End: 2019-12-12
Attending: FAMILY MEDICINE
Payer: MEDICARE

## 2019-12-12 VITALS
HEART RATE: 69 BPM | WEIGHT: 106 LBS | RESPIRATION RATE: 16 BRPM | BODY MASS INDEX: 25.8 KG/M2 | TEMPERATURE: 97.4 F | OXYGEN SATURATION: 98 % | DIASTOLIC BLOOD PRESSURE: 62 MMHG | SYSTOLIC BLOOD PRESSURE: 114 MMHG

## 2019-12-12 DIAGNOSIS — E03.9 ACQUIRED HYPOTHYROIDISM: ICD-10-CM

## 2019-12-12 DIAGNOSIS — F63.9 IMPULSE CONTROL DISORDER: ICD-10-CM

## 2019-12-12 DIAGNOSIS — F32.0 MILD MAJOR DEPRESSION (H): ICD-10-CM

## 2019-12-12 DIAGNOSIS — Q90.9 DOWN'S SYNDROME: ICD-10-CM

## 2019-12-12 DIAGNOSIS — E78.5 HYPERLIPIDEMIA, UNSPECIFIED HYPERLIPIDEMIA TYPE: Primary | ICD-10-CM

## 2019-12-12 DIAGNOSIS — F41.9 ANXIETY: ICD-10-CM

## 2019-12-12 DIAGNOSIS — F91.9 DISTURBANCE OF CONDUCT: ICD-10-CM

## 2019-12-12 LAB
ALT SERPL W P-5'-P-CCNC: 30 U/L (ref 0–50)
CHOLEST SERPL-MCNC: 151 MG/DL
ERYTHROCYTE [DISTWIDTH] IN BLOOD BY AUTOMATED COUNT: 13.2 % (ref 10–15)
HCT VFR BLD AUTO: 42.3 % (ref 35–47)
HDLC SERPL-MCNC: 46 MG/DL
HGB BLD-MCNC: 13.7 G/DL (ref 11.7–15.7)
LDLC SERPL CALC-MCNC: 51 MG/DL
MCH RBC QN AUTO: 33.7 PG (ref 26.5–33)
MCHC RBC AUTO-ENTMCNC: 32.4 G/DL (ref 31.5–36.5)
MCV RBC AUTO: 104 FL (ref 78–100)
MISCELLANEOUS TEST: NORMAL
NONHDLC SERPL-MCNC: 105 MG/DL
PLATELET # BLD AUTO: 241 10E9/L (ref 150–450)
RBC # BLD AUTO: 4.06 10E12/L (ref 3.8–5.2)
T4 FREE SERPL-MCNC: 0.89 NG/DL (ref 0.76–1.46)
TRIGL SERPL-MCNC: 270 MG/DL
TSH SERPL DL<=0.005 MIU/L-ACNC: 9.52 MU/L (ref 0.4–4)
WBC # BLD AUTO: 8.1 10E9/L (ref 4–11)

## 2019-12-12 PROCEDURE — G0463 HOSPITAL OUTPT CLINIC VISIT: HCPCS

## 2019-12-12 PROCEDURE — 84439 ASSAY OF FREE THYROXINE: CPT | Mod: ZL | Performed by: FAMILY MEDICINE

## 2019-12-12 PROCEDURE — 84443 ASSAY THYROID STIM HORMONE: CPT | Mod: ZL | Performed by: FAMILY MEDICINE

## 2019-12-12 PROCEDURE — 84460 ALANINE AMINO (ALT) (SGPT): CPT | Mod: ZL | Performed by: FAMILY MEDICINE

## 2019-12-12 PROCEDURE — 99214 OFFICE O/P EST MOD 30 MIN: CPT | Performed by: FAMILY MEDICINE

## 2019-12-12 PROCEDURE — 80061 LIPID PANEL: CPT | Mod: ZL | Performed by: FAMILY MEDICINE

## 2019-12-12 PROCEDURE — 85027 COMPLETE CBC AUTOMATED: CPT | Mod: ZL | Performed by: FAMILY MEDICINE

## 2019-12-12 PROCEDURE — 36415 COLL VENOUS BLD VENIPUNCTURE: CPT | Mod: ZL | Performed by: FAMILY MEDICINE

## 2019-12-12 ASSESSMENT — PAIN SCALES - GENERAL: PAINLEVEL: NO PAIN (0)

## 2019-12-12 NOTE — Clinical Note
Referral to you or one of your partners was requested by patient's sisters.  She has Down syndrome and is showing some behavioral changes.  She has had self injurious behavior (hitting her head, etc), and sister's believe medications need to be adjusted.  I would appreciate if you or your partners could evaluate this very nice woman.

## 2019-12-12 NOTE — NURSING NOTE
"Chief Complaint   Patient presents with     Lipids     Thyroid Problem       Initial /62 (BP Location: Right arm, Patient Position: Sitting, Cuff Size: Adult Regular)   Pulse 69   Temp 97.4  F (36.3  C) (Tympanic)   Resp 16   Wt 48.1 kg (106 lb)   SpO2 98%   BMI 25.80 kg/m   Estimated body mass index is 25.8 kg/m  as calculated from the following:    Height as of 5/20/19: 1.365 m (4' 5.75\").    Weight as of this encounter: 48.1 kg (106 lb).  Medication Reconciliation: complete  Dahlia Javed MA  "

## 2019-12-19 NOTE — PROGRESS NOTES
[facility]  [address]  [place] [address]  [phone]  Dept: [phone]    PRE-OP EVALUATION:  Today's date: 2019    Aaliyah Dean (: 1970) presents for pre-operative evaluation assessment as requested by Dr. Skinner.  She requires evaluation and anesthesia risk assessment prior to undergoing surgery/procedure for treatment of cataracts.    Proposed Surgery/ Procedure: IOL exchange, left  Date of Surgery/ Procedure: 20  Time of Surgery/ Procedure: [facility]  Hospital/Surgical Facility: [facility]  Fax number for surgical facility: [phone]  Primary Physician: Clarissa Santos  Type of Anesthesia Anticipated: to be determined    Patient has a Health Care Directive or Living Will:  NO    1. NO - Do you have a history of heart attack, stroke, stent, bypass or surgery on an artery in the head, neck, heart or legs?  2. NO - Do you ever have any pain or discomfort in your chest?  3. NO - Do you have a history of  Heart Failure?  4. NO - Are you troubled by shortness of breath when: walking on the level, up a slight hill or at night?  5. NO - Do you currently have a cold, bronchitis or other respiratory infection?  6. NO - Do you have a cough, shortness of breath or wheezing?  7. NO - Do you sometimes get pains in the calves of your legs when you walk?  8. NO - Do you or anyone in your family have previous history of blood clots?  9. NO - Do you or does anyone in your family have a serious bleeding problem such as prolonged bleeding following surgeries or cuts?  10. YES - HAVE YOU EVER HAD PROBLEMS WITH ANEMIA OR BEEN TOLD TO TAKE IRON PILLS? Taking iron pills  11. NO - Have you had any abnormal blood loss such as black, tarry or bloody stools, or abnormal vaginal bleeding?  12. NO - Have you ever had a blood transfusion?  13. NO - Have you or any of your relatives ever had problems with anesthesia?  14. NO - Do you have sleep apnea,  excessive snoring or daytime drowsiness?  15. NO - Do you have any prosthetic heart valves?  16. NO - Do you have prosthetic joints?  17. NO - Is there any chance that you may be pregnant?      HPI:     HPI related to upcoming procedure: Cataracts present in left      HYPERLIPIDEMIA - Patient has a long history of significant Hyperlipidemia requiring medication for treatment with recent good control. Patient reports no problems or side effects with the medication.     HYPOTHYROIDISM - Patient has a longstanding history of chronic Hypothyroidism. Patient has been doing well, noting no tremor, insomnia, hair loss or changes in skin texture. Continues to take medications as directed, without adverse reactions or side effects. Last TSH   Lab Results   Component Value Date    TSH 9.52 (H) 12/12/2019   .        MEDICAL HISTORY:     Patient Active Problem List    Diagnosis Date Noted     Anxiety 06/20/2019     Priority: Medium     ACP (advance care planning) 06/12/2018     Priority: Medium     Allergic rhinitis 11/15/2010     Priority: Medium     Other acne 11/15/2010     Priority: Medium     Impulse control disorder 11/15/2010     Priority: Medium     Dysphagia 11/13/2007     Priority: Medium     Convulsions (H) 05/16/2007     Priority: Medium     Disturbance of conduct 01/05/2006     Priority: Medium     Down's syndrome 06/03/2002     Priority: Medium     Hyperlipidemia 05/09/2002     Priority: Medium     Hypothyroidism 03/21/2001     Priority: Medium      Past Medical History:   Diagnosis Date     Allergic rhinitis, cause unspecified 11/15/2010     Down's syndrome 06/03/2002     DYSPHAGIA NOS 11/13/2007     Impulse control disorder, unspecified 11/15/2010     Other acne 11/15/2010     Other and unspecified hyperlipidemia 05/09/2002     Other convulsions 05/16/2007     Unspecified acquired hypothyroidism 03/21/2001     Unspecified disturbance of conduct 01/05/2006     Past Surgical History:   Procedure Laterality Date      CHOLECYSTECTOMY  01/2011     ENT SURGERY       EXAM UNDER ANESTHESIA PELVIC N/A 5/4/2016    Procedure: EXAM UNDER ANESTHESIA PELVIC;  Surgeon: Valentin Ferris MD;  Location: [facility]     GENITOURINARY SURGERY      INTERNAL URETHROTOMY     HERNIA REPAIR  march 4 2015    ventral herniorrhaphy with intraperitoneal sepramesh     ileostomy take-down  2011     illeostomy  04/25/2011     ventilation tubes, bilateral       Current Outpatient Medications   Medication Sig Dispense Refill     Calcium Carb-Cholecalciferol (CALCIUM-VITAMIN D3) 250-125 MG-UNIT TABS TAKE ONE (1) TABLET BY MOUTH TWICE DAILY WITH MEALS 100 tablet 5     chlorhexidine (PERIDEX) 0.12 % solution SWAB ON TEETH AND GUMS FOR 30 SECONDS TWICE A  mL 11     fish oil-omega-3 fatty acids 1000 MG capsule TAKE ONE CAPSULE BY MOUTH THREE TIMES DAILY 100 capsule 1     fluticasone (FLONASE) 50 MCG/ACT nasal spray INSTILL 2 SPRAYS INTO BOTH NOSTRILS DAILY SHAKE GENTLY 16 g 10     GNP IRON 200 (65 Fe) MG TABS TAKE ONE (1) TABLET BY MOUTH TWICE DAILY 100 tablet 1     guanFACINE (TENEX) 1 MG tablet Take 0.5 tablets (0.5 mg) by mouth 3 times daily 45 tablet 3     ibuprofen (ADVIL,MOTRIN) 400 MG tablet Take 1 tablet by mouth every 4-6 hours as needed 120 tablet 2     JOLESSA 0.15-0.03 MG tablet TAKE ONE (1) TABLET BY MOUTH DAILY QUASENSE/JOLESSA 91 tablet 1     levothyroxine (SYNTHROID/LEVOTHROID) 150 MCG tablet Take 1 tablet (150 mcg) by mouth daily 30 tablet 10     loratadine (CLARITIN) 10 MG tablet Take 1 tablet (10 mg) by mouth daily 30 tablet 10     omeprazole (PRILOSEC) 20 MG DR capsule TAKE ONE (1) CAPSULE BY MOUTH DAILY 90 capsule 1     risperiDONE (RISPERDAL) 0.25 MG tablet TAKE ONE (1) TABLET BY MOUTH THREE TIMES DAILY 90 tablet 0     Simethicone (GAS-X EXTRA STRENGTH) 125 MG CAPS Take 1-2 capsules by mouth 2 times daily as needed 30 capsule 1     simvastatin (ZOCOR) 10 MG tablet TAKE ONE (1) TABLET BY MOUTH DAILY 30 tablet 5     Skin Protectants, Misc.  (EUCERIN) cream        triamcinolone (KENALOG) 0.1 % cream Apply sparingly to affected area twice daily as needed 80 g 1     vitamin C (ASCORBIC ACID) 500 MG tablet TAKE ONE (1) TABLET BY MOUTH DAILY 100 tablet 1     OTC products: None, except as noted above    Allergies   Allergen Reactions     Brimonidine Other (See Comments)     Pain, swelling and erythema     Amoxicillin      Cephalexin Monohydrate      Keflex        Latex Allergy: NO    Social History     Tobacco Use     Smoking status: Never Smoker     Smokeless tobacco: Never Used     Tobacco comment: no passive exposure   Substance Use Topics     Alcohol use: No     History   Drug Use No       REVIEW OF SYSTEMS:   Constitutional, neuro, ENT, endocrine, pulmonary, cardiac, gastrointestinal, genitourinary, musculoskeletal, integument and psychiatric systems are negative, except as otherwise noted.    EXAM:   /62 (BP Location: Right arm, Patient Position: Sitting, Cuff Size: Adult Regular)   Pulse 69   Temp 98.2  F (36.8  C) (Tympanic)   Resp 16   Wt 49.3 kg (108 lb 9.6 oz)   SpO2 97%   BMI 26.43 kg/m      GENERAL APPEARANCE: healthy, alert and no distress     EYES: EOMI, PERRL     HENT: ear canals and TM's normal and nose and mouth without ulcers or lesions     NECK: no adenopathy     RESP: lungs clear to auscultation - no rales, rhonchi or wheezes     CV: regular rates and rhythm, normal S1 S2, no S3 or S4 and no murmur, click or rub     ABDOMEN:  soft, nontender, no HSM or masses and bowel sounds normal     SKIN: healing laceration/abrasion along left cheek     NEURO: Normal strength and tone, sensory exam grossly normal, mentation intact and speech normal     PSYCH: mentation appears normal. and affect normal/bright    DIAGNOSTICS:   No labs or EKG required for low risk surgery (cataract, skin procedure, breast biopsy, etc)    Recent Labs   Lab Test 12/12/19  1115 09/17/19  1601 05/20/19  1007   HGB 13.7 13.6 13.4    227 245   NA  --  140  139   POTASSIUM  --  4.9 4.9   CR  --  0.92 0.92        IMPRESSION:   Reason for surgery/procedure: Cataract  Diagnosis/reason for consult: Cardiopulmonary clearance    The proposed surgical procedure is considered LOW risk.    REVISED CARDIAC RISK INDEX  The patient has the following serious cardiovascular risks for perioperative complications such as (MI, PE, VFib and 3  AV Block):  No serious cardiac risks  INTERPRETATION: 0 risks: Class I (very low risk - 0.4% complication rate)    The patient has the following additional risks for perioperative complications:  No identified additional risks      ICD-10-CM    1. Preop general physical exam Z01.818    2. Cataract, unspecified cataract type, unspecified laterality H26.9    3. Anxiety F41.9    4. Hyperlipidemia, unspecified hyperlipidemia type E78.5    5. Acquired hypothyroidism E03.9        RECOMMENDATIONS:       Cardiovascular Risk  Performs 4 METs exercise without symptoms (Light housework (dusting, washing dishes) and Climb a flight of stairs) .       --Patient is to take all scheduled medications on the day of surgery EXCEPT for modifications listed below.    Anticoagulant or Antiplatelet Medication Use  Hold all ASA/NSAIDs/Vitamins/Supplements 7-10 days prior to procedure         APPROVAL GIVEN to proceed with proposed procedure, without further diagnostic evaluation       Signed Electronically by: Clarissa Santos MD    Copy of this evaluation report is provided to requesting physician.    [facility] Preop Guidelines    Revised Cardiac Risk Index

## 2019-12-20 ENCOUNTER — OFFICE VISIT (OUTPATIENT)
Dept: FAMILY MEDICINE | Facility: OTHER | Age: 49
End: 2019-12-20
Attending: FAMILY MEDICINE
Payer: MEDICARE

## 2019-12-20 VITALS
OXYGEN SATURATION: 97 % | BODY MASS INDEX: 26.43 KG/M2 | RESPIRATION RATE: 16 BRPM | DIASTOLIC BLOOD PRESSURE: 62 MMHG | SYSTOLIC BLOOD PRESSURE: 116 MMHG | HEART RATE: 69 BPM | WEIGHT: 108.6 LBS | TEMPERATURE: 98.2 F

## 2019-12-20 DIAGNOSIS — E78.5 HYPERLIPIDEMIA, UNSPECIFIED HYPERLIPIDEMIA TYPE: ICD-10-CM

## 2019-12-20 DIAGNOSIS — F41.9 ANXIETY: ICD-10-CM

## 2019-12-20 DIAGNOSIS — H26.9 CATARACT, UNSPECIFIED CATARACT TYPE, UNSPECIFIED LATERALITY: ICD-10-CM

## 2019-12-20 DIAGNOSIS — E03.9 ACQUIRED HYPOTHYROIDISM: ICD-10-CM

## 2019-12-20 DIAGNOSIS — Z01.818 PREOP GENERAL PHYSICAL EXAM: Primary | ICD-10-CM

## 2019-12-20 PROCEDURE — 99214 OFFICE O/P EST MOD 30 MIN: CPT | Performed by: FAMILY MEDICINE

## 2019-12-20 PROCEDURE — G0463 HOSPITAL OUTPT CLINIC VISIT: HCPCS

## 2019-12-20 ASSESSMENT — PAIN SCALES - GENERAL: PAINLEVEL: MILD PAIN (2)

## 2019-12-20 NOTE — NURSING NOTE
"Chief Complaint   Patient presents with     Pre-Op Exam       Initial /62 (BP Location: Right arm, Patient Position: Sitting, Cuff Size: Adult Regular)   Pulse 69   Temp 98.2  F (36.8  C) (Tympanic)   Resp 16   Wt 49.3 kg (108 lb 9.6 oz)   SpO2 97%   BMI 26.43 kg/m   Estimated body mass index is 26.43 kg/m  as calculated from the following:    Height as of 5/20/19: 1.365 m (4' 5.75\").    Weight as of this encounter: 49.3 kg (108 lb 9.6 oz).  Medication Reconciliation: complete  Dahlia Javed MA  "

## 2020-01-20 ENCOUNTER — TRANSFERRED RECORDS (OUTPATIENT)
Dept: HEALTH INFORMATION MANAGEMENT | Facility: CLINIC | Age: 50
End: 2020-01-20

## 2020-01-28 NOTE — TELEPHONE ENCOUNTER
received refill request from [facility],   Pt uses [facility]  Confirmed with guardian    Doris Prater LPN

## 2020-02-10 DIAGNOSIS — F95.2 TOURETTE'S DISORDER: ICD-10-CM

## 2020-02-11 RX ORDER — RISPERIDONE 0.25 MG/1
TABLET ORAL
Qty: 90 TABLET | Refills: 0 | Status: SHIPPED | OUTPATIENT
Start: 2020-02-11 | End: 2020-03-11

## 2020-02-13 DIAGNOSIS — Z72.89 SELF-INJURIOUS BEHAVIOR: Primary | ICD-10-CM

## 2020-02-13 NOTE — TELEPHONE ENCOUNTER
LOV 12/20/19    Naltrexone- not on current or past med list, requesting 50 mg a 1/2 tablet BID, but epic states cannot half tabs. Please advise

## 2020-02-14 NOTE — TELEPHONE ENCOUNTER
Can you call the patient's residence and get more information on this medication?  Who prescribed it, what is it being used for?

## 2020-02-20 NOTE — TELEPHONE ENCOUNTER
Spoke with [facility] home director; patient was prescribed this medication during visit with [facility] in [place] regarding the patient's SIB's.  Called [facility] [place] at [phone] and [facility] for return call to discuss this refill request.

## 2020-02-20 NOTE — TELEPHONE ENCOUNTER
Writer left message with Viktoria at [facility] to obtain information regarding this refill request. [phone]

## 2020-02-21 RX ORDER — NALTREXONE HYDROCHLORIDE 50 MG/1
50 TABLET, FILM COATED ORAL DAILY
Qty: 30 TABLET | Refills: 0 | Status: SHIPPED | OUTPATIENT
Start: 2020-02-21 | End: 2020-10-13

## 2020-02-21 RX ORDER — NALTREXONE HYDROCHLORIDE 50 MG/1
TABLET, FILM COATED ORAL
Qty: 30 TABLET | Refills: 0 | OUTPATIENT
Start: 2020-02-21

## 2020-02-21 NOTE — TELEPHONE ENCOUNTER
Refill approved for one month, would still like to hear back from [place] regarding who will manage refills as I do not usually prescribe this medication.

## 2020-03-02 ENCOUNTER — HEALTH MAINTENANCE LETTER (OUTPATIENT)
Age: 50
End: 2020-03-02

## 2020-03-05 DIAGNOSIS — E78.5 HYPERLIPIDEMIA LDL GOAL <100: ICD-10-CM

## 2020-03-05 NOTE — TELEPHONE ENCOUNTER
Simvastatin 10 MG      Last Written Prescription Date:  8-5-19  Last Fill Quantity: 30,   # refills: 5  Last Office Visit: 12-20-19  Future Office visit:    Next 5 appointments (look out 90 days)    May 22, 2020 10:30 AM CDT  (Arrive by 10:15 AM)  PHYSICAL with Clarissa Santos MD  [facility] ([facility] ) [address]  [facility] [address]  [phone]           Routing refill request to provider for review/approval because:

## 2020-03-10 RX ORDER — SIMVASTATIN 10 MG
TABLET ORAL
Qty: 30 TABLET | Refills: 5 | Status: SHIPPED | OUTPATIENT
Start: 2020-03-10 | End: 2020-08-06

## 2020-03-12 DIAGNOSIS — F63.9 IMPULSE CONTROL DISEASE: ICD-10-CM

## 2020-03-12 NOTE — TELEPHONE ENCOUNTER
Guanfacine 1MG      Last Written Prescription Date:  11-14-19  Last Fill Quantity: 45,   # refills: 3  Last Office Visit: 12-20-19  Future Office visit:    Next 5 appointments (look out 90 days)    May 22, 2020 10:30 AM CDT  (Arrive by 10:15 AM)  PHYSICAL with Clarissa Santos MD  [facility] ([facility] ) [address]  [place] [address]  [phone]           Routing refill request to provider for review/approval because:

## 2020-03-13 RX ORDER — GUANFACINE 1 MG/1
0.5 TABLET ORAL 3 TIMES DAILY
Qty: 45 TABLET | Refills: 1 | Status: SHIPPED | OUTPATIENT
Start: 2020-03-13 | End: 2020-05-19

## 2020-03-19 DIAGNOSIS — Q90.9 DOWN'S SYNDROME: ICD-10-CM

## 2020-03-19 RX ORDER — LEVONORGESTREL / ETHINYL ESTRADIOL 0.15-0.03
KIT ORAL
Qty: 91 TABLET | Refills: 3 | Status: SHIPPED | OUTPATIENT
Start: 2020-03-19 | End: 2021-03-30

## 2020-03-19 NOTE — TELEPHONE ENCOUNTER
Jolessa 0.15 mg-30 mcg (91) tablets,3 month dose pack   Last Written Prescription Date:  9/17/2019  Last Fill Quantity: 91,   # refills: 1  Last Office Visit: 12/20/2019  Future Office visit:    Next 5 appointments (look out 90 days)    May 22, 2020 10:30 AM CDT  (Arrive by 10:15 AM)  PHYSICAL with Clarissa Santos MD  [facility] ([facility] ) [address]  [facility] [address]  [phone]

## 2020-04-16 DIAGNOSIS — F95.2 TOURETTE'S DISORDER: ICD-10-CM

## 2020-04-16 RX ORDER — RISPERIDONE 0.25 MG/1
TABLET ORAL
Qty: 90 TABLET | Refills: 2 | Status: SHIPPED | OUTPATIENT
Start: 2020-04-16 | End: 2020-07-06

## 2020-04-17 ENCOUNTER — TELEPHONE (OUTPATIENT)
Dept: FAMILY MEDICINE | Facility: OTHER | Age: 50
End: 2020-04-17

## 2020-04-17 NOTE — TELEPHONE ENCOUNTER
Outreach call done with Dustin(mother) about Alineatrell.      · Review of Medical Records.  Referral from Dr. Romero    · Received a message from Dr. Romero about family no-showing for appt yesterday.  CPS open case.  Called and left message for Katelin Do CPS  about family n/s.    · Receive a phone call from Dr. Guardado discussed family having trouble with transportation.  Will reach out to family for assistance.   · Spoke to Dustin and informed her that we have a taxi voucher to transportation to office.  Dr. Romero can see patient at 1:00pm tomorrow and then sibling needs to see Dr. Guardado after visit.  Dustin understood plan and will call Laura Henao and then I will meet family at entrance to office and will give taxi the voucher.        · Plan--Reach out to family again on 9/17/20.   Received a PA from [email or web address] for Guanfacine 1 mg tablets. Submitted on CMM. Waiting for a response.

## 2020-04-18 NOTE — TELEPHONE ENCOUNTER
Received an APPROVAL from [facility] for Guanfacine 1 mg tablets. Effective 01/18/2020 to 04/17/2021. Forms scanned to [facility].

## 2020-04-27 DIAGNOSIS — Z00.00 HEALTHCARE MAINTENANCE: ICD-10-CM

## 2020-04-27 RX ORDER — FERROUS SULFATE 325(65) MG
TABLET ORAL
Qty: 100 TABLET | Refills: 1 | Status: SHIPPED | OUTPATIENT
Start: 2020-04-27 | End: 2020-06-23

## 2020-04-27 NOTE — TELEPHONE ENCOUNTER
gnp iron      Last Written Prescription Date:  11/14/2019  Last Fill Quantity: 100,   # refills: 1  Last Office Visit: 12/20/2019  Future Office visit:       Routing refill request to provider for review/approval because:  Drug not on the [facility], P or [facility] refill protocol or controlled substance

## 2020-05-18 DIAGNOSIS — F63.9 IMPULSE CONTROL DISEASE: ICD-10-CM

## 2020-05-19 RX ORDER — GUANFACINE 1 MG/1
0.5 TABLET ORAL 3 TIMES DAILY
Qty: 45 TABLET | Refills: 0 | Status: SHIPPED | OUTPATIENT
Start: 2020-05-19 | End: 2020-07-13

## 2020-05-26 DIAGNOSIS — E03.9 HYPOTHYROIDISM, UNSPECIFIED TYPE: ICD-10-CM

## 2020-05-29 RX ORDER — LEVOTHYROXINE SODIUM 150 UG/1
150 TABLET ORAL DAILY
Qty: 30 TABLET | Refills: 0 | Status: SHIPPED | OUTPATIENT
Start: 2020-05-29 | End: 2020-08-06

## 2020-05-29 NOTE — TELEPHONE ENCOUNTER
One refill sent.  Patient is due for recheck of TSH, order placed, can schedule for lab only in [place].

## 2020-05-29 NOTE — TELEPHONE ENCOUNTER
levothyroxine (SYNTHROID/LEVOTHROID) 150 MCG tablet     Thyroid Protocol Failed    Normal TSH on file in past 12 months    TSH   Date Value Ref Range Status   12/12/2019 9.52 (H) 0.40 - 4.00 mU/L Final

## 2020-05-29 NOTE — TELEPHONE ENCOUNTER
levothyroxine (SYNTHROID/LEVOTHROID) 150 MCG tablet      Last Written Prescription Date:  8/13/19  Last Fill Quantity: 30,   # refills: 10  Last Office Visit: 12/20/19  Future Office visit:

## 2020-06-16 DIAGNOSIS — E03.9 HYPOTHYROIDISM, UNSPECIFIED TYPE: ICD-10-CM

## 2020-06-16 LAB
T4 FREE SERPL-MCNC: 0.99 NG/DL (ref 0.76–1.46)
TSH SERPL DL<=0.005 MIU/L-ACNC: 4.91 MU/L (ref 0.4–4)

## 2020-06-16 PROCEDURE — 84439 ASSAY OF FREE THYROXINE: CPT | Mod: ZL | Performed by: FAMILY MEDICINE

## 2020-06-16 PROCEDURE — 36415 COLL VENOUS BLD VENIPUNCTURE: CPT | Mod: ZL | Performed by: FAMILY MEDICINE

## 2020-06-16 PROCEDURE — 84443 ASSAY THYROID STIM HORMONE: CPT | Mod: ZL | Performed by: FAMILY MEDICINE

## 2020-06-22 DIAGNOSIS — Z00.00 HEALTHCARE MAINTENANCE: ICD-10-CM

## 2020-06-22 NOTE — TELEPHONE ENCOUNTER
gnp iron      Last Written Prescription Date:  4.27.2020  Last Fill Quantity: 100,   # refills: 1  Last Office Visit: 12.20.19

## 2020-06-23 RX ORDER — FERROUS SULFATE 325(65) MG
325 TABLET ORAL 2 TIMES DAILY
Qty: 180 TABLET | Refills: 3 | Status: SHIPPED | OUTPATIENT
Start: 2020-06-23 | End: 2021-06-01

## 2020-06-23 NOTE — TELEPHONE ENCOUNTER
ferrous sulfate 325 mg (65 mg iron) tablet     Iron Supplements Failed    Medication is not active on med list

## 2020-06-29 DIAGNOSIS — Z00.00 HEALTHCARE MAINTENANCE: ICD-10-CM

## 2020-06-29 DIAGNOSIS — Z00.00 HEALTH CARE MAINTENANCE: ICD-10-CM

## 2020-06-30 NOTE — TELEPHONE ENCOUNTER
Calcium/Vitamin D      Last Written Prescription Date:  08/30/19  Last Fill Quantity: 100,   # refills: 5  Last Office Visit: 12/20/19  Future Office visit:       Routing refill request to provider for review/approval because:    Omega 3      Last Written Prescription Date:  09/25/19  Last Fill Quantity: 100,   # refills: 1  Last Office Visit: 12/20/19  Future Office visit:       Routing refill request to provider for review/approval because:

## 2020-07-01 RX ORDER — CHLORAL HYDRATE 500 MG
CAPSULE ORAL
Qty: 100 CAPSULE | Refills: 4 | Status: SHIPPED | OUTPATIENT
Start: 2020-07-01 | End: 2020-07-06

## 2020-07-03 DIAGNOSIS — F95.2 TOURETTE'S DISORDER: ICD-10-CM

## 2020-07-03 DIAGNOSIS — R13.10 DYSPHAGIA, UNSPECIFIED TYPE: ICD-10-CM

## 2020-07-06 DIAGNOSIS — Z00.00 HEALTH CARE MAINTENANCE: ICD-10-CM

## 2020-07-06 DIAGNOSIS — Z00.00 HEALTHCARE MAINTENANCE: ICD-10-CM

## 2020-07-06 RX ORDER — RISPERIDONE 0.25 MG/1
TABLET ORAL
Qty: 90 TABLET | Refills: 5 | Status: SHIPPED | OUTPATIENT
Start: 2020-07-06 | End: 2020-11-30

## 2020-07-06 RX ORDER — CHLORAL HYDRATE 500 MG
CAPSULE ORAL
Qty: 100 CAPSULE | Refills: 5 | Status: SHIPPED | OUTPATIENT
Start: 2020-07-06 | End: 2020-07-07 | Stop reason: ALTCHOICE

## 2020-07-06 NOTE — TELEPHONE ENCOUNTER
Omeprazole 20 MG      Last Written Prescription Date:  5-  Last Fill Quantity: 90,   # refills: 0  Last Office Visit: 12-20-19  Future Office visit:         Risperidone 0.25 MG      Last Written Prescription Date:  4-  Last Fill Quantity: 90,   # refills: 2  Last Office Visit: 12-20-19  Future Office visit:

## 2020-07-06 NOTE — TELEPHONE ENCOUNTER
Fish oil 1000 MG      Last Written Prescription Date:  7-1-2020  Last Fill Quantity: 100,   # refills: 4  Last Office Visit: 12-20-19    Chlorhexidine 0.12% solution      Last Written Prescription Date:  5-20-19  Last Fill Quantity: 473 ML,   # refills: 11  Last Office Visit: 12-20-19  Future Office visit:

## 2020-07-06 NOTE — TELEPHONE ENCOUNTER
risperidone 0.25 mg tablet     Antipsychotic Medications Failed    Recent (6 mo) or future (30 days) visit within the authorizing provider's specialty

## 2020-07-07 RX ORDER — CHLORHEXIDINE GLUCONATE ORAL RINSE 1.2 MG/ML
SOLUTION DENTAL
Qty: 473 ML | Refills: 11 | Status: SHIPPED | OUTPATIENT
Start: 2020-07-07 | End: 2021-08-10

## 2020-07-07 RX ORDER — OMEGA-3 FATTY ACIDS/FISH OIL 300-1000MG
1000 CAPSULE ORAL 3 TIMES DAILY
Qty: 100 CAPSULE | Refills: 3 | Status: SHIPPED | OUTPATIENT
Start: 2020-07-07 | End: 2020-11-18

## 2020-07-13 DIAGNOSIS — F63.9 IMPULSE CONTROL DISEASE: ICD-10-CM

## 2020-07-13 RX ORDER — GUANFACINE 1 MG/1
0.5 TABLET ORAL 3 TIMES DAILY
Qty: 45 TABLET | Refills: 0 | Status: SHIPPED | OUTPATIENT
Start: 2020-07-13 | End: 2020-08-06

## 2020-07-13 NOTE — TELEPHONE ENCOUNTER
guanfacin      Last Written Prescription Date:  5/19/20  Last Fill Quantity: 45,   # refills: 0  Last Office Visit: 12/20/19  Future Office visit:       Routing refill request to provider for review/approval because:

## 2020-08-01 DIAGNOSIS — E78.5 HYPERLIPIDEMIA LDL GOAL <100: ICD-10-CM

## 2020-08-01 DIAGNOSIS — E03.9 HYPOTHYROIDISM, UNSPECIFIED TYPE: ICD-10-CM

## 2020-08-01 DIAGNOSIS — F63.9 IMPULSE CONTROL DISEASE: ICD-10-CM

## 2020-08-01 DIAGNOSIS — Z00.00 HEALTH CARE MAINTENANCE: ICD-10-CM

## 2020-08-05 NOTE — TELEPHONE ENCOUNTER
tenex      Last Written Prescription Date:  7/13/20  Last Fill Quantity: 45,   # refills: 0  Last Office Visit: 12/20/19  Future Office visit:         levothyroxine      Last Written Prescription Date:  5/29/20  Last Fill Quantity: 30,   # refills: 0  Last Office Visit: 12/20/19  Future Office visit:       zocor      Last Written Prescription Date:  3/10/20  Last Fill Quantity: 30,   # refills: 5  Last Office Visit: 12/20/19  Future Office visit:         Vitamin C      Last Written Prescription Date:  1/24/20  Last Fill Quantity: 100,   # refills: 1  Last Office Visit: 12/20/19  Future Office visit:

## 2020-08-06 RX ORDER — LEVOTHYROXINE SODIUM 150 UG/1
150 TABLET ORAL DAILY
Qty: 30 TABLET | Refills: 5 | Status: SHIPPED | OUTPATIENT
Start: 2020-08-06 | End: 2020-08-28

## 2020-08-06 RX ORDER — SIMVASTATIN 10 MG
TABLET ORAL
Qty: 30 TABLET | Refills: 5 | Status: SHIPPED | OUTPATIENT
Start: 2020-08-06 | End: 2020-08-26

## 2020-08-06 RX ORDER — ASCORBIC ACID 500 MG
TABLET ORAL
Qty: 100 TABLET | Refills: 1 | Status: SHIPPED | OUTPATIENT
Start: 2020-08-06 | End: 2021-02-22

## 2020-08-06 RX ORDER — GUANFACINE 1 MG/1
0.5 TABLET ORAL 3 TIMES DAILY
Qty: 45 TABLET | Refills: 5 | Status: SHIPPED | OUTPATIENT
Start: 2020-08-06 | End: 2021-01-08

## 2020-08-15 DIAGNOSIS — J32.9 CHRONIC SINUSITIS, UNSPECIFIED LOCATION: ICD-10-CM

## 2020-08-17 RX ORDER — FLUTICASONE PROPIONATE 50 MCG
SPRAY, SUSPENSION (ML) NASAL
Qty: 16 G | Refills: 3 | Status: SHIPPED | OUTPATIENT
Start: 2020-08-17 | End: 2021-04-15

## 2020-08-26 DIAGNOSIS — E78.5 HYPERLIPIDEMIA LDL GOAL <100: ICD-10-CM

## 2020-08-26 NOTE — TELEPHONE ENCOUNTER
Simvastatin 10 mg      Last Written Prescription Date:  8/6/20  Last Fill Quantity: 30,   # refills: 5  Last Office Visit: 12/20/19  Future Office visit:       Routing refill request to provider for review/approval because:  Refill went to [facility] and pt wants refills sent to [facility].

## 2020-08-27 DIAGNOSIS — E03.9 HYPOTHYROIDISM, UNSPECIFIED TYPE: ICD-10-CM

## 2020-08-27 RX ORDER — SIMVASTATIN 10 MG
TABLET ORAL
Qty: 30 TABLET | Refills: 3 | Status: SHIPPED | OUTPATIENT
Start: 2020-08-27 | End: 2021-02-01

## 2020-08-27 NOTE — TELEPHONE ENCOUNTER
Levothyroxin 150 mcg      Last Written Prescription Date:  8/5/20  Last Fill Quantity: 30,   # refills: 5  Last Office Visit: 12/2019  Future Office visit:       Routing refill request to provider for review/approval because:  Pharmacy wants 90 tablet script.

## 2020-08-28 RX ORDER — LEVOTHYROXINE SODIUM 150 UG/1
150 TABLET ORAL DAILY
Qty: 90 TABLET | Refills: 1 | Status: SHIPPED | OUTPATIENT
Start: 2020-08-28 | End: 2020-12-28

## 2020-10-10 DIAGNOSIS — Z72.89 SELF-INJURIOUS BEHAVIOR: ICD-10-CM

## 2020-10-13 RX ORDER — NALTREXONE HYDROCHLORIDE 50 MG/1
TABLET, FILM COATED ORAL
Qty: 30 TABLET | Refills: 3 | Status: SHIPPED | OUTPATIENT
Start: 2020-10-13 | End: 2021-01-12

## 2020-10-13 NOTE — TELEPHONE ENCOUNTER
naltrexone      Last Written Prescription Date:  2/21/2020  Last Fill Quantity: 30,   # refills: 0  Last Office Visit: 12/20/2019  Future Office visit:

## 2020-11-01 DIAGNOSIS — R13.10 DYSPHAGIA, UNSPECIFIED TYPE: ICD-10-CM

## 2020-11-02 NOTE — TELEPHONE ENCOUNTER
prilosec      Last Written Prescription Date:  7/6/2020  Last Fill Quantity: 90,   # refills: 0  Last Office Visit: 12/20/19  Future Office visit:

## 2020-11-16 DIAGNOSIS — Z00.00 HEALTHCARE MAINTENANCE: ICD-10-CM

## 2020-11-18 RX ORDER — CHLORAL HYDRATE 500 MG
CAPSULE ORAL
Qty: 100 CAPSULE | Refills: 11 | Status: SHIPPED | OUTPATIENT
Start: 2020-11-18 | End: 2021-11-02

## 2020-11-18 NOTE — TELEPHONE ENCOUNTER
Fish oil      Last Written Prescription Date:   Not on medication list  Last Fill Quantity: ,   # refills:   Last Office Visit: 12/20/2020  Future Office visit:

## 2020-11-30 DIAGNOSIS — F95.2 TOURETTE'S DISORDER: ICD-10-CM

## 2020-11-30 RX ORDER — RISPERIDONE 0.25 MG/1
TABLET ORAL
Qty: 90 TABLET | Refills: 5 | Status: SHIPPED | OUTPATIENT
Start: 2020-11-30 | End: 2021-06-30

## 2020-11-30 NOTE — TELEPHONE ENCOUNTER
risperdal      Last Written Prescription Date:  7/6/2020  Last Fill Quantity: 90,   # refills: 5  Last Office Visit: 12/20/19  Future Office visit:

## 2020-12-22 NOTE — PROGRESS NOTES
Subjective     Aaliyah Dean is a 50 year old female who presents to clinic today for the following health issues:    HPI         Hyperlipidemia Follow-Up      Are you regularly taking any medication or supplement to lower your cholesterol?   Yes- Zocor    Are you having muscle aches or other side effects that you think could be caused by your cholesterol lowering medication?  No    Hypothyroidism Follow-up      Since last visit, patient describes the following symptoms: Weight stable, no hair loss, no skin changes, no constipation, no loose stools      How many servings of fruits and vegetables do you eat daily?  2-3    On average, how many sweetened beverages do you drink each day (Examples: soda, juice, sweet tea, etc.  Do NOT count diet or artificially sweetened beverages)?   1    How many days per week do you exercise enough to make your heart beat faster? 7    How many minutes a day do you exercise enough to make your heart beat faster? 20 - 29    How many days per week do you miss taking your medication? 0    GERD/Heartburn  Onset/Duration: ongoing  Description: acid reflex  Intensity: moderate  Progression of Symptoms: worsening and intermittent  Accompanying Signs & Symptoms:  Does it feel like food gets stuck or trouble swallowing: YES- occasionally  Nausea: no  Vomiting (bloody?): YES- no blood noted  Abdominal Pain: YES  Black-Tarry stools: YES do to iron pill intake  Bloody stools: no  History:  Previous similar episodes: YES  Previous ulcers: no  Precipitating factors:   Caffeine use: no- 1 cup of decaf coffee at breakfast  Alcohol use: no  NSAID/Aspirin use: no  Tobacco use: no  Worse with no particular food or drink.  Alleviating factors: None  Therapies tried and outcome:             Lifestyle changes: diet modifications- in-effective- no specific triggers known at this point            Medications: Omeprazole (Prilosec) and antacids- non-effective      Rash      Duration: long  time    Description  Location: left hand, near knuckle of left index finger  Itching: no    Intensity:  moderate    Accompanying signs and symptoms: itching    History (similar episodes/previous evaluation): None    Precipitating or alleviating factors:  New exposures:  None  Recent travel: no      Therapies tried and outcome: lotion - no improvement       Patient Active Problem List   Diagnosis     Down's syndrome     Hypothyroidism     Hyperlipidemia     Disturbance of conduct     Convulsions (H)     Dysphagia     Allergic rhinitis     Other acne     Impulse control disorder     ACP (advance care planning)     Anxiety     Past Surgical History:   Procedure Laterality Date     CHOLECYSTECTOMY  01/2011     ENT SURGERY       EXAM UNDER ANESTHESIA PELVIC N/A 5/4/2016    Procedure: EXAM UNDER ANESTHESIA PELVIC;  Surgeon: Valentin Ferris MD;  Location: [facility]     GENITOURINARY SURGERY      INTERNAL URETHROTOMY     HERNIA REPAIR  march 4 2015    ventral herniorrhaphy with intraperitoneal sepramesh     ileostomy take-down  2011     illeostomy  04/25/2011     ventilation tubes, bilateral         Social History     Tobacco Use     Smoking status: Never Smoker     Smokeless tobacco: Never Used     Tobacco comment: no passive exposure   Substance Use Topics     Alcohol use: No     Family History   Problem Relation Age of Onset     Cancer Mother         cause of death     Diabetes No family hx of            Current Outpatient Medications   Medication Sig Dispense Refill     Calcium Carb-Cholecalciferol (CALCIUM-VITAMIN D3) 250-125 MG-UNIT TABS TAKE ONE (1) TABLET BY MOUTH TWICE DAILY WITH MEALS 100 tablet 4     chlorhexidine (PERIDEX) 0.12 % solution SWAB ON TEETH AND GUMS FOR 30 SECONDS TWICE A  mL 11     ferrous sulfate (FEROSUL) 325 (65 Fe) MG tablet Take 1 tablet (325 mg) by mouth 2 times daily 180 tablet 3     fish oil-omega-3 fatty acids 1000 MG capsule Take 1,000 mg by mouth 3 times daily 100 capsule 11      fluticasone (FLONASE) 50 MCG/ACT nasal spray INSTILL 2 SPRAYS INTO BOTH NOSTRILS DAILY SHAKE GENTLY 16 g 3     guanFACINE (TENEX) 1 MG tablet Take 0.5 tablets (0.5 mg) by mouth 3 times daily 45 tablet 5     ibuprofen (ADVIL,MOTRIN) 400 MG tablet Take 1 tablet by mouth every 4-6 hours as needed 120 tablet 2     JOLESSA 0.15-0.03 MG tablet TAKE ONE (1) TABLET BY MOUTH DAILY QUASENSE/JOLESSA 91 tablet 3     loratadine (CLARITIN) 10 MG tablet Take 1 tablet (10 mg) by mouth daily 30 tablet 10     naltrexone (DEPADE/REVIA) 50 MG tablet Take 1/2 tablet by mouth twice a day 30 tablet 3     omeprazole (PRILOSEC) 40 MG DR capsule Take 1 capsule (40 mg) by mouth daily 30 capsule 5     risperiDONE (RISPERDAL) 0.25 MG tablet TAKE ONE (1) TABLET BY MOUTH THREE TIMES DAILY 90 tablet 5     Simethicone (GAS-X EXTRA STRENGTH) 125 MG CAPS Take 1-2 capsules by mouth 2 times daily as needed 30 capsule 1     simvastatin (ZOCOR) 10 MG tablet TAKE ONE (1) TABLET BY MOUTH DAILY 30 tablet 3     Skin Protectants, Misc. (EUCERIN) cream        triamcinolone (ARISTOCORT HP) 0.5 % external cream Apply topically 2 times daily as needed (eczema on left hand) , may use for three weeks then stop for one week then resume 15 g 2     triamcinolone (KENALOG) 0.1 % cream Apply sparingly to affected area twice daily as needed 80 g 1     vitamin C (ASCORBIC ACID) 500 MG tablet TAKE ONE (1) TABLET BY MOUTH DAILY 100 tablet 1     levothyroxine (SYNTHROID/LEVOTHROID) 150 MCG tablet Take 1 tablet (150 mcg) by mouth daily 30 tablet 5     Allergies   Allergen Reactions     Brimonidine Other (See Comments)     Pain, swelling and erythema     Amoxicillin      Cephalexin Monohydrate      Keflex         Family History, Social History, Tobacco Use are all reviewed and updated        Review of Systems   Constitutional, HEENT, cardiovascular, pulmonary, gi and gu systems are negative, except as otherwise noted.      Objective    /68 (BP Location: Left arm, Patient  Position: Sitting, Cuff Size: Adult Regular)   Pulse 72   Temp 96.7  F (35.9  C) (Tympanic)   Wt 49.4 kg (109 lb)   SpO2 99%   BMI 26.53 kg/m    Body mass index is 26.53 kg/m .  Physical Exam   GENERAL: healthy, alert and no distress  SKIN: patch of dry irritated skin on left hand, consistent with eczema  PSYCH: mentation appears normal, affect normal/bright          Assessment & Plan     1. Hyperlipidemia, unspecified hyperlipidemia type  Labs updated, will adjust medications if needed.  - Lipid Profile (Chol, Trig, HDL, LDL calc)  - Basic metabolic panel  - ALT    2. Acquired hypothyroidism  Labs updated.  - TSH with free T4 reflex    3. Gastroesophageal reflux disease, unspecified whether esophagitis present  Will increase omeprazole to 40 mg daily    4. Dysphagia, unspecified type  As above  - omeprazole (PRILOSEC) 40 MG DR capsule; Take 1 capsule (40 mg) by mouth daily  Dispense: 30 capsule; Refill: 5    5. Eczema, unspecified type  Will use stronger steroid cream  - triamcinolone (ARISTOCORT HP) 0.5 % external cream; Apply topically 2 times daily as needed (eczema on left hand) , may use for three weeks then stop for one week then resume  Dispense: 15 g; Refill: 2            Return in about 6 months (around 6/28/2021) for Chronic Disease Management, Medication review.    Clarissa Santos MD  [facility]

## 2020-12-28 ENCOUNTER — OFFICE VISIT (OUTPATIENT)
Dept: FAMILY MEDICINE | Facility: OTHER | Age: 50
End: 2020-12-28
Attending: FAMILY MEDICINE
Payer: MEDICARE

## 2020-12-28 VITALS
BODY MASS INDEX: 26.53 KG/M2 | WEIGHT: 109 LBS | TEMPERATURE: 96.7 F | HEART RATE: 72 BPM | OXYGEN SATURATION: 99 % | DIASTOLIC BLOOD PRESSURE: 68 MMHG | SYSTOLIC BLOOD PRESSURE: 110 MMHG

## 2020-12-28 DIAGNOSIS — E78.5 HYPERLIPIDEMIA, UNSPECIFIED HYPERLIPIDEMIA TYPE: Primary | ICD-10-CM

## 2020-12-28 DIAGNOSIS — L30.9 ECZEMA, UNSPECIFIED TYPE: ICD-10-CM

## 2020-12-28 DIAGNOSIS — K21.9 GASTROESOPHAGEAL REFLUX DISEASE, UNSPECIFIED WHETHER ESOPHAGITIS PRESENT: ICD-10-CM

## 2020-12-28 DIAGNOSIS — R13.10 DYSPHAGIA, UNSPECIFIED TYPE: ICD-10-CM

## 2020-12-28 DIAGNOSIS — E03.9 HYPOTHYROIDISM, UNSPECIFIED TYPE: ICD-10-CM

## 2020-12-28 DIAGNOSIS — E03.9 ACQUIRED HYPOTHYROIDISM: ICD-10-CM

## 2020-12-28 LAB
ALT SERPL W P-5'-P-CCNC: 40 U/L (ref 0–50)
ANION GAP SERPL CALCULATED.3IONS-SCNC: 5 MMOL/L (ref 3–14)
BUN SERPL-MCNC: 17 MG/DL (ref 7–30)
CALCIUM SERPL-MCNC: 9.2 MG/DL (ref 8.5–10.1)
CHLORIDE SERPL-SCNC: 106 MMOL/L (ref 94–109)
CHOLEST SERPL-MCNC: 150 MG/DL
CO2 SERPL-SCNC: 29 MMOL/L (ref 20–32)
CREAT SERPL-MCNC: 0.88 MG/DL (ref 0.52–1.04)
GFR SERPL CREATININE-BSD FRML MDRD: 78 ML/MIN/{1.73_M2}
GLUCOSE SERPL-MCNC: 71 MG/DL (ref 70–99)
HDLC SERPL-MCNC: 45 MG/DL
LDLC SERPL CALC-MCNC: 67 MG/DL
NONHDLC SERPL-MCNC: 105 MG/DL
POTASSIUM SERPL-SCNC: 4.8 MMOL/L (ref 3.4–5.3)
SODIUM SERPL-SCNC: 140 MMOL/L (ref 133–144)
TRIGL SERPL-MCNC: 190 MG/DL
TSH SERPL DL<=0.005 MIU/L-ACNC: 1.64 MU/L (ref 0.4–4)

## 2020-12-28 PROCEDURE — 84460 ALANINE AMINO (ALT) (SGPT): CPT | Mod: ZL | Performed by: FAMILY MEDICINE

## 2020-12-28 PROCEDURE — 80061 LIPID PANEL: CPT | Mod: ZL | Performed by: FAMILY MEDICINE

## 2020-12-28 PROCEDURE — 80048 BASIC METABOLIC PNL TOTAL CA: CPT | Mod: ZL | Performed by: FAMILY MEDICINE

## 2020-12-28 PROCEDURE — G0463 HOSPITAL OUTPT CLINIC VISIT: HCPCS

## 2020-12-28 PROCEDURE — 84443 ASSAY THYROID STIM HORMONE: CPT | Mod: ZL | Performed by: FAMILY MEDICINE

## 2020-12-28 PROCEDURE — 36415 COLL VENOUS BLD VENIPUNCTURE: CPT | Mod: ZL | Performed by: FAMILY MEDICINE

## 2020-12-28 PROCEDURE — 99214 OFFICE O/P EST MOD 30 MIN: CPT | Performed by: FAMILY MEDICINE

## 2020-12-28 RX ORDER — LEVOTHYROXINE SODIUM 150 UG/1
150 TABLET ORAL DAILY
Qty: 30 TABLET | Refills: 5 | Status: SHIPPED | OUTPATIENT
Start: 2020-12-28 | End: 2021-06-30

## 2020-12-28 RX ORDER — OMEPRAZOLE 40 MG/1
40 CAPSULE, DELAYED RELEASE ORAL DAILY
Qty: 30 CAPSULE | Refills: 5 | Status: SHIPPED | OUTPATIENT
Start: 2020-12-28 | End: 2021-02-25

## 2020-12-28 RX ORDER — TRIAMCINOLONE ACETONIDE 5 MG/G
CREAM TOPICAL 2 TIMES DAILY PRN
Qty: 15 G | Refills: 2 | Status: SHIPPED | OUTPATIENT
Start: 2020-12-28

## 2020-12-28 ASSESSMENT — ANXIETY QUESTIONNAIRES
7. FEELING AFRAID AS IF SOMETHING AWFUL MIGHT HAPPEN: NOT AT ALL
6. BECOMING EASILY ANNOYED OR IRRITABLE: SEVERAL DAYS
5. BEING SO RESTLESS THAT IT IS HARD TO SIT STILL: NOT AT ALL
3. WORRYING TOO MUCH ABOUT DIFFERENT THINGS: NOT AT ALL
GAD7 TOTAL SCORE: 1
4. TROUBLE RELAXING: NOT AT ALL
2. NOT BEING ABLE TO STOP OR CONTROL WORRYING: NOT AT ALL
IF YOU CHECKED OFF ANY PROBLEMS ON THIS QUESTIONNAIRE, HOW DIFFICULT HAVE THESE PROBLEMS MADE IT FOR YOU TO DO YOUR WORK, TAKE CARE OF THINGS AT HOME, OR GET ALONG WITH OTHER PEOPLE: NOT DIFFICULT AT ALL
1. FEELING NERVOUS, ANXIOUS, OR ON EDGE: NOT AT ALL

## 2020-12-28 ASSESSMENT — PATIENT HEALTH QUESTIONNAIRE - PHQ9: SUM OF ALL RESPONSES TO PHQ QUESTIONS 1-9: 0

## 2020-12-28 ASSESSMENT — PAIN SCALES - GENERAL: PAINLEVEL: NO PAIN (0)

## 2020-12-28 NOTE — TELEPHONE ENCOUNTER
Synthroid       Last Written Prescription Date:  8/28/2020  Last Fill Quantity: 90,   # refills: 1  Last Office Visit: 12/28/2020  Future Office visit:

## 2020-12-28 NOTE — NURSING NOTE
"Chief Complaint   Patient presents with     Lipids     Thyroid Problem     Gastrophageal Reflux       Initial /68 (BP Location: Left arm, Patient Position: Sitting, Cuff Size: Adult Regular)   Pulse 72   Temp 96.7  F (35.9  C) (Tympanic)   Wt 49.4 kg (109 lb)   SpO2 99%   BMI 26.53 kg/m   Estimated body mass index is 26.53 kg/m  as calculated from the following:    Height as of 5/20/19: 1.365 m (4' 5.75\").    Weight as of this encounter: 49.4 kg (109 lb).  Medication Reconciliation: complete  Chioma Petersen LPN  "

## 2020-12-28 NOTE — LETTER
December 30, 2020      Aaliyah J Jermaine  [address]  [place] [address]        Dear ,    We are writing to inform you of your test results.    Your test results fall within the expected range(s) or remain unchanged from previous results.  Please continue with current treatment plan.    Resulted Orders   Lipid Profile (Chol, Trig, HDL, LDL calc)   Result Value Ref Range    Cholesterol 150 <200 mg/dL    Triglycerides 190 (H) <150 mg/dL      Comment:      Borderline high:  150-199 mg/dl  High:             200-499 mg/dl  Very high:       >499 mg/dl  Non Fasting      HDL Cholesterol 45 (L) >49 mg/dL    LDL Cholesterol Calculated 67 <100 mg/dL      Comment:      Desirable:       <100 mg/dl    Non HDL Cholesterol 105 <130 mg/dL   Basic metabolic panel   Result Value Ref Range    Sodium 140 133 - 144 mmol/L    Potassium 4.8 3.4 - 5.3 mmol/L    Chloride 106 94 - 109 mmol/L    Carbon Dioxide 29 20 - 32 mmol/L    Anion Gap 5 3 - 14 mmol/L    Glucose 71 70 - 99 mg/dL      Comment:      Non Fasting    Urea Nitrogen 17 7 - 30 mg/dL    Creatinine 0.88 0.52 - 1.04 mg/dL    GFR Estimate 78 >60 mL/min/[1.73_m2]      Comment:      Non  GFR Calc  Starting 12/18/2018, serum creatinine based estimated GFR (eGFR) will be   calculated using the Chronic Kidney Disease Epidemiology Collaboration   (CKD-EPI) equation.      GFR Estimate If Black >90 >60 mL/min/[1.73_m2]      Comment:       GFR Calc  Starting 12/18/2018, serum creatinine based estimated GFR (eGFR) will be   calculated using the Chronic Kidney Disease Epidemiology Collaboration   (CKD-EPI) equation.      Calcium 9.2 8.5 - 10.1 mg/dL   ALT   Result Value Ref Range    ALT 40 0 - 50 U/L   TSH with free T4 reflex   Result Value Ref Range    TSH 1.64 0.40 - 4.00 mU/L       If you have any questions or concerns, please call the clinic at the number listed above.       Sincerely,      Clarissa Santos MD

## 2020-12-29 ASSESSMENT — ANXIETY QUESTIONNAIRES: GAD7 TOTAL SCORE: 1

## 2021-01-08 DIAGNOSIS — F63.9 IMPULSE CONTROL DISEASE: ICD-10-CM

## 2021-01-08 RX ORDER — GUANFACINE 1 MG/1
0.5 TABLET ORAL 3 TIMES DAILY
Qty: 45 TABLET | Refills: 5 | Status: SHIPPED | OUTPATIENT
Start: 2021-01-08 | End: 2021-04-22

## 2021-01-11 DIAGNOSIS — Z72.89 SELF-INJURIOUS BEHAVIOR: ICD-10-CM

## 2021-01-12 RX ORDER — NALTREXONE HYDROCHLORIDE 50 MG/1
TABLET, FILM COATED ORAL
Qty: 30 TABLET | Refills: 5 | Status: SHIPPED | OUTPATIENT
Start: 2021-01-12 | End: 2021-07-27

## 2021-01-12 NOTE — TELEPHONE ENCOUNTER
Naltrexone 50 mg      Last Written Prescription Date:  10/13/20  Last Fill Quantity: 30,   # refills: 3  Last Office Visit: 12/28/20  Future Office visit:       Routing refill request to provider for review/approval because:

## 2021-02-01 DIAGNOSIS — E78.5 HYPERLIPIDEMIA LDL GOAL <100: ICD-10-CM

## 2021-02-01 RX ORDER — SIMVASTATIN 10 MG
TABLET ORAL
Qty: 30 TABLET | Refills: 5 | Status: SHIPPED | OUTPATIENT
Start: 2021-02-01 | End: 2021-02-24

## 2021-02-22 DIAGNOSIS — Z00.00 HEALTH CARE MAINTENANCE: ICD-10-CM

## 2021-02-22 RX ORDER — ASCORBIC ACID 500 MG
TABLET ORAL
Qty: 100 TABLET | Refills: 3 | Status: SHIPPED | OUTPATIENT
Start: 2021-02-22 | End: 2022-07-18

## 2021-02-22 NOTE — TELEPHONE ENCOUNTER
Vitamin C     Last Written Prescription Date:  8.6.2020  Last Fill Quantity: 100,   # refills: 1  Last Office Visit: 12.28.2020  Future Office visit:       Routing refill request to provider for review/approval because:  Non-formulary    Kristel Phoenix RN

## 2021-02-24 DIAGNOSIS — E78.5 HYPERLIPIDEMIA LDL GOAL <100: ICD-10-CM

## 2021-02-24 DIAGNOSIS — R13.10 DYSPHAGIA, UNSPECIFIED TYPE: ICD-10-CM

## 2021-02-24 NOTE — TELEPHONE ENCOUNTER
Omeprazol 40 mg      Last Written Prescription Date:  12/28/20  Last Fill Quantity: 30,   # refills: 5  Last Office Visit: 2/28/20  Future Office visit:       Routing refill request to provider for review/approval because:  New pharmacy    Simvastatin 10 mg      Last Written Prescription Date:  2/1/21  Last Fill Quantity: 30,   # refills: 5  Last Office Visit: 2/28/20  Future Office visit:       Routing refill request to provider for review/approval because:  New pharmacy

## 2021-02-25 RX ORDER — SIMVASTATIN 10 MG
10 TABLET ORAL AT BEDTIME
Qty: 30 TABLET | Refills: 5 | Status: SHIPPED | OUTPATIENT
Start: 2021-02-25 | End: 2021-07-27

## 2021-02-25 RX ORDER — OMEPRAZOLE 40 MG/1
40 CAPSULE, DELAYED RELEASE ORAL DAILY
Qty: 30 CAPSULE | Refills: 5 | Status: SHIPPED | OUTPATIENT
Start: 2021-02-25 | End: 2021-06-08

## 2021-02-27 ENCOUNTER — HEALTH MAINTENANCE LETTER (OUTPATIENT)
Age: 51
End: 2021-02-27

## 2021-03-08 DIAGNOSIS — Z00.00 HEALTH CARE MAINTENANCE: ICD-10-CM

## 2021-03-30 DIAGNOSIS — Q90.9 DOWN'S SYNDROME: ICD-10-CM

## 2021-03-30 RX ORDER — LEVONORGESTREL AND ETHINYL ESTRADIOL 0.15-0.03
KIT ORAL
Qty: 91 TABLET | Refills: 0 | Status: SHIPPED | OUTPATIENT
Start: 2021-03-30 | End: 2021-09-13

## 2021-04-14 DIAGNOSIS — J32.9 CHRONIC SINUSITIS, UNSPECIFIED LOCATION: ICD-10-CM

## 2021-04-14 NOTE — TELEPHONE ENCOUNTER
Jefrynase      Last Written Prescription Date:  08/17/20  Last Fill Quantity: 16g,   # refills: 3  Last Office Visit: 12/28/20  Future Office visit:    Next 5 appointments (look out 90 days)    Jun 01, 2021  3:00 PM  (Arrive by 2:45 PM)  PHYSICAL with Clarissa Santos MD  [facility] ([facility] ) [address]  [facility], [place] [address]  [phone]           Routing refill request to provider for review/approval because:  Medication is reported/historical

## 2021-04-15 RX ORDER — FLUTICASONE PROPIONATE 50 MCG
SPRAY, SUSPENSION (ML) NASAL
Qty: 16 G | Refills: 3 | Status: SHIPPED | OUTPATIENT
Start: 2021-04-15 | End: 2022-07-26

## 2021-04-18 ENCOUNTER — HEALTH MAINTENANCE LETTER (OUTPATIENT)
Age: 51
End: 2021-04-18

## 2021-04-22 DIAGNOSIS — F63.9 IMPULSE CONTROL DISEASE: ICD-10-CM

## 2021-04-22 RX ORDER — GUANFACINE 1 MG/1
0.5 TABLET ORAL 3 TIMES DAILY
Qty: 135 TABLET | Refills: 1 | Status: SHIPPED | OUTPATIENT
Start: 2021-04-22 | End: 2021-07-27

## 2021-04-22 NOTE — TELEPHONE ENCOUNTER
FAX REQUESTING PHARMACY CHANGE FOR COST SAVINGS      GUANFACINE      Last Written Prescription Date:  1-8-2021  Last Fill Quantity: 45,   # refills: 5  Last Office Visit: -2020  Future Office visit:    Next 5 appointments (look out 90 days)    Jun 01, 2021  3:00 PM  (Arrive by 2:45 PM)  PHYSICAL with Clarissa Santos MD  [facility] ([facility] ) [address]  [facility] [address]  [phone]           Routing refill request to provider for review/approval because:

## 2021-04-27 ENCOUNTER — TELEPHONE (OUTPATIENT)
Dept: FAMILY MEDICINE | Facility: OTHER | Age: 51
End: 2021-04-27

## 2021-04-27 NOTE — TELEPHONE ENCOUNTER
Received a PA from [employer] for Guanfacine HCL 1mg tablets. Submitted on CMM. Waiting for a response.

## 2021-04-28 NOTE — TELEPHONE ENCOUNTER
Received an APPROVAL from [employer] for Guanfacine HCL 1mg tablets. Effective 1/27/2021 - 4/27/2022. Forms scanned to Epic.

## 2021-05-12 NOTE — PROGRESS NOTES
SUBJECTIVE:   CC: Aaliyah Dean is an 50 year old woman who presents for preventive health visit.     Patient has been advised of split billing requirements and indicates understanding: Yes    Healthy Habits:    Do you get at least three servings of calcium containing foods daily (dairy, green leafy vegetables, etc.)? no, taking calcium and/or vitamin D supplement: yes - calcium/VitaminD    Amount of exercise or daily activities, outside of work: 5 day(s) per week    Problems taking medications regularly No    Medication side effects: No    Have you had an eye exam in the past two years? yes    Do you see a dentist twice per year? yes    Do you have sleep apnea, excessive snoring or daytime drowsiness?always tired      Hyperlipidemia Follow-Up      Are you regularly taking any medication or supplement to lower your cholesterol?   Yes- simvastatin    Are you having muscle aches or other side effects that you think could be caused by your cholesterol lowering medication?  No    Anxiety Follow-Up    How are you doing with your anxiety since your last visit? No change    Are you having other symptoms that might be associated with anxiety? No    Have you had a significant life event? No     Are you feeling depressed? No    Do you have any concerns with your use of alcohol or other drugs? No    Social History     Tobacco Use     Smoking status: Never Smoker     Smokeless tobacco: Never Used     Tobacco comment: no passive exposure   Substance Use Topics     Alcohol use: No     Drug use: No     TAYLER-7 SCORE 12/28/2020   Total Score 1     PHQ 12/28/2020   PHQ-9 Total Score 0   Q9: Thoughts of better off dead/self-harm past 2 weeks Not at all       Hypothyroidism Follow-up      Since last visit, patient describes the following symptoms: Weight stable, no hair loss, no skin changes, no constipation, no loose stools    Patient has been complaining of right shoulder pain for a number of months.  She will not lift her  arm.  She is also having pain of her foot.    Today's PHQ-2 Score: No flowsheet data found.    Abuse: Current or Past(Physical, Sexual or Emotional)- No  Do you feel safe in your environment? Yes        Social History     Tobacco Use     Smoking status: Never Smoker     Smokeless tobacco: Never Used     Tobacco comment: no passive exposure   Substance Use Topics     Alcohol use: No     If you drink alcohol do you typically have >3 drinks per day or >7 drinks per week? Not Applicable                     Reviewed orders with patient.  Reviewed health maintenance and updated orders accordingly - Yes  Patient Active Problem List   Diagnosis     Down's syndrome     Hypothyroidism     Hyperlipidemia     Disturbance of conduct     Convulsions (H)     Dysphagia     Allergic rhinitis     Other acne     Impulse control disorder     ACP (advance care planning)     Anxiety     Past Surgical History:   Procedure Laterality Date     CHOLECYSTECTOMY  01/2011     ENT SURGERY       EXAM UNDER ANESTHESIA PELVIC N/A 5/4/2016    Procedure: EXAM UNDER ANESTHESIA PELVIC;  Surgeon: Valentin Ferris MD;  Location: [facility]     GENITOURINARY SURGERY      INTERNAL URETHROTOMY     HERNIA REPAIR  march 4 2015    ventral herniorrhaphy with intraperitoneal sepramesh     ileostomy take-down  2011     illeostomy  04/25/2011     ventilation tubes, bilateral         Social History     Tobacco Use     Smoking status: Never Smoker     Smokeless tobacco: Never Used     Tobacco comment: no passive exposure   Substance Use Topics     Alcohol use: No     Family History   Problem Relation Age of Onset     Cancer Mother         cause of death     Diabetes No family hx of          Current Outpatient Medications   Medication Sig Dispense Refill     Calcium Carb-Cholecalciferol (CALCIUM-VITAMIN D3) 250-125 MG-UNIT TABS TAKE ONE (1) TABLET BY MOUTH TWICE DAILY WITH MEALS 100 tablet 4     chlorhexidine (PERIDEX) 0.12 % solution SWAB ON TEETH AND GUMS FOR 30 SECONDS  TWICE A  mL 11     FEROSUL 325 (65 Fe) MG tablet Take 1 tablet (325 mg) by mouth 2 times daily 180 tablet 3     fish oil-omega-3 fatty acids 1000 MG capsule Take 1,000 mg by mouth 3 times daily 100 capsule 11     fluticasone (FLONASE) 50 MCG/ACT nasal spray INSTILL 2 SPRAYS INTO BOTH NOSTRILS DAILY SHAKE GENTLY 16 g 3     guanFACINE (TENEX) 1 MG tablet Take 0.5 tablets (0.5 mg) by mouth 3 times daily 135 tablet 1     ibuprofen (ADVIL,MOTRIN) 400 MG tablet Take 1 tablet by mouth every 4-6 hours as needed 120 tablet 2     levonorgestrel-ethinyl estradiol (SEASONALE) 0.15-0.03 MG tablet TAKE ONE (1) TABLET BY MOUTH DAILY QUASENSE/JOLESSA 91 tablet 0     levothyroxine (SYNTHROID/LEVOTHROID) 150 MCG tablet Take 1 tablet (150 mcg) by mouth daily 30 tablet 5     loratadine (CLARITIN) 10 MG tablet Take 1 tablet (10 mg) by mouth daily 30 tablet 10     naltrexone (DEPADE/REVIA) 50 MG tablet Take 1/2 tablet by mouth twice a day 30 tablet 5     omeprazole (PRILOSEC) 40 MG DR capsule Take 1 capsule (40 mg) by mouth daily 30 capsule 5     risperiDONE (RISPERDAL) 0.25 MG tablet TAKE ONE (1) TABLET BY MOUTH THREE TIMES DAILY 90 tablet 5     Simethicone (GAS-X EXTRA STRENGTH) 125 MG CAPS Take 1-2 capsules by mouth 2 times daily as needed 30 capsule 1     simvastatin (ZOCOR) 10 MG tablet Take 1 tablet (10 mg) by mouth At Bedtime 30 tablet 5     Skin Protectants, Misc. (EUCERIN) cream        triamcinolone (ARISTOCORT HP) 0.5 % external cream Apply topically 2 times daily as needed (eczema on left hand) , may use for three weeks then stop for one week then resume 15 g 2     triamcinolone (KENALOG) 0.1 % cream Apply sparingly to affected area twice daily as needed 80 g 1     vitamin C (ASCORBIC ACID) 500 MG tablet TAKE ONE (1) TABLET BY MOUTH DAILY 100 tablet 3     Allergies   Allergen Reactions     Brimonidine Other (See Comments)     Pain, swelling and erythema     Amoxicillin      Cephalexin Monohydrate      Keflex          FSH-7: No flowsheet data found.    Mammogram to be discussed with guardian  Pertinent mammograms are reviewed under the imaging tab.    Pertinent mammograms are reviewed under the imaging tab.  History of abnormal Pap smear: NO - age 30-65 PAP every 5 years with negative HPV co-testing recommended  PAP / HPV 5/4/2016   PAP NIL   Patient's last pap was completed under anesthesia in the OR      Reviewed and updated as needed this visit by clinical staff                 Reviewed and updated as needed this visit by Provider                    ROS:  CONSTITUTIONAL: NEGATIVE for fever, chills, change in weight  INTEGUMENTARU/SKIN: NEGATIVE for worrisome rashes, moles or lesions  EYES: NEGATIVE for vision changes or irritation  ENT: NEGATIVE for ear, mouth and throat problems  RESP: NEGATIVE for significant cough or SOB  BREAST: NEGATIVE for masses, tenderness or discharge  CV: NEGATIVE for chest pain, palpitations or peripheral edema  GI: NEGATIVE for nausea, abdominal pain, heartburn, or change in bowel habits  : NEGATIVE for unusual urinary or vaginal symptoms. Periods are regular.  MUSCULOSKELETAL: NEGATIVE for significant arthralgias or myalgia  NEURO: NEGATIVE for weakness, dizziness or paresthesias  PSYCHIATRIC: NEGATIVE for changes in mood or affect    OBJECTIVE:   There were no vitals taken for this visit.  EXAM:  GENERAL: healthy, alert and no distress  EYES: Eyes grossly normal to inspection, PERRL and conjunctivae and sclerae normal  HENT: ear canals and TM's normal, nose and mouth without ulcers or lesions  NECK: no adenopathy  RESP: lungs clear to auscultation - no rales, rhonchi or wheezes  CV: regular rate and rhythm, normal S1 S2, no S3 or S4, no murmur, click or rub, no peripheral edema and peripheral pulses strong  ABDOMEN: soft, nontender, no hepatosplenomegaly, no masses and bowel sounds normal  MS: unwilling to move arm at right shoulder, with distraction I can lift arm to about 70 degrees  "before patient notices; significant bunion formation of left foot with callous  SKIN: no suspicious lesions or rashes  NEURO: Normal strength and tone, sensory exam grossly normal and mentation intact  PSYCH: mentation appears normal, affect normal/bright    Diagnostic Test Results:  See orders    ASSESSMENT/PLAN:       ICD-10-CM    1. Routine general medical examination at a health care facility  Z00.00 T4 free   2. Down's syndrome  Q90.9    3. Hyperlipidemia, unspecified hyperlipidemia type  E78.5 Lipid Profile (Chol, Trig, HDL, LDL calc)     Comprehensive metabolic panel     CBC with platelets   4. Anxiety  F41.9    5. Impulse control disorder  F63.9    6. Disturbance of conduct  F91.9    7. Acquired hypothyroidism  E03.9 TSH with free T4 reflex   8. Chronic right shoulder pain  M25.511 ORTHOPEDIC ADULT REFERRAL    G89.29    9. Bunion, left  M21.612 ORTHOPEDIC ADULT REFERRAL       Patient has been advised of split billing requirements and indicates understanding: Yes  COUNSELING:   Reviewed preventive health counseling, as reflected in patient instructions    Estimated body mass index is 26.53 kg/m  as calculated from the following:    Height as of 5/20/19: 1.365 m (4' 5.75\").    Weight as of 12/28/20: 49.4 kg (109 lb).        She reports that she has never smoked. She has never used smokeless tobacco.      Counseling Resources:  ATP IV Guidelines  Pooled Cohorts Equation Calculator  Breast Cancer Risk Calculator  BRCA-Related Cancer Risk Assessment: FHS-7 Tool  FRAX Risk Assessment  ICSI Preventive Guidelines  Dietary Guidelines for Americans, 2010  USDA's MyPlate  ASA Prophylaxis  Lung CA Screening    Clarissa Santos MD  [facility]  "

## 2021-05-28 DIAGNOSIS — R13.10 DYSPHAGIA, UNSPECIFIED TYPE: ICD-10-CM

## 2021-05-28 RX ORDER — OMEPRAZOLE 40 MG/1
40 CAPSULE, DELAYED RELEASE ORAL DAILY
Qty: 30 CAPSULE | Refills: 5 | OUTPATIENT
Start: 2021-05-28

## 2021-06-01 ENCOUNTER — OFFICE VISIT (OUTPATIENT)
Dept: FAMILY MEDICINE | Facility: OTHER | Age: 51
End: 2021-06-01
Attending: FAMILY MEDICINE
Payer: MEDICARE

## 2021-06-01 VITALS
HEIGHT: 55 IN | OXYGEN SATURATION: 98 % | RESPIRATION RATE: 16 BRPM | HEART RATE: 71 BPM | BODY MASS INDEX: 24.51 KG/M2 | SYSTOLIC BLOOD PRESSURE: 96 MMHG | DIASTOLIC BLOOD PRESSURE: 56 MMHG | TEMPERATURE: 97.1 F | WEIGHT: 105.9 LBS

## 2021-06-01 DIAGNOSIS — E78.5 HYPERLIPIDEMIA, UNSPECIFIED HYPERLIPIDEMIA TYPE: ICD-10-CM

## 2021-06-01 DIAGNOSIS — Z00.00 ROUTINE GENERAL MEDICAL EXAMINATION AT A HEALTH CARE FACILITY: Primary | ICD-10-CM

## 2021-06-01 DIAGNOSIS — M25.511 CHRONIC RIGHT SHOULDER PAIN: ICD-10-CM

## 2021-06-01 DIAGNOSIS — F91.9 DISTURBANCE OF CONDUCT: ICD-10-CM

## 2021-06-01 DIAGNOSIS — F41.9 ANXIETY: ICD-10-CM

## 2021-06-01 DIAGNOSIS — Z00.00 HEALTHCARE MAINTENANCE: ICD-10-CM

## 2021-06-01 DIAGNOSIS — E03.9 ACQUIRED HYPOTHYROIDISM: ICD-10-CM

## 2021-06-01 DIAGNOSIS — Q90.9 DOWN'S SYNDROME: ICD-10-CM

## 2021-06-01 DIAGNOSIS — F63.9 IMPULSE CONTROL DISORDER: ICD-10-CM

## 2021-06-01 DIAGNOSIS — G89.29 CHRONIC RIGHT SHOULDER PAIN: ICD-10-CM

## 2021-06-01 DIAGNOSIS — M21.612 BUNION, LEFT: ICD-10-CM

## 2021-06-01 LAB
ALBUMIN SERPL-MCNC: 3 G/DL (ref 3.4–5)
ALP SERPL-CCNC: 104 U/L (ref 40–150)
ALT SERPL W P-5'-P-CCNC: 40 U/L (ref 0–50)
ANION GAP SERPL CALCULATED.3IONS-SCNC: 6 MMOL/L (ref 3–14)
AST SERPL W P-5'-P-CCNC: 21 U/L (ref 0–45)
BILIRUB SERPL-MCNC: 0.3 MG/DL (ref 0.2–1.3)
BUN SERPL-MCNC: 16 MG/DL (ref 7–30)
CALCIUM SERPL-MCNC: 8.3 MG/DL (ref 8.5–10.1)
CHLORIDE SERPL-SCNC: 107 MMOL/L (ref 94–109)
CHOLEST SERPL-MCNC: 134 MG/DL
CO2 SERPL-SCNC: 27 MMOL/L (ref 20–32)
CREAT SERPL-MCNC: 1 MG/DL (ref 0.52–1.04)
ERYTHROCYTE [DISTWIDTH] IN BLOOD BY AUTOMATED COUNT: 13.3 % (ref 10–15)
GFR SERPL CREATININE-BSD FRML MDRD: 66 ML/MIN/{1.73_M2}
GLUCOSE SERPL-MCNC: 85 MG/DL (ref 70–99)
HCT VFR BLD AUTO: 39.5 % (ref 35–47)
HDLC SERPL-MCNC: 42 MG/DL
HGB BLD-MCNC: 13.2 G/DL (ref 11.7–15.7)
LDLC SERPL CALC-MCNC: 58 MG/DL
MCH RBC QN AUTO: 34 PG (ref 26.5–33)
MCHC RBC AUTO-ENTMCNC: 33.4 G/DL (ref 31.5–36.5)
MCV RBC AUTO: 102 FL (ref 78–100)
NONHDLC SERPL-MCNC: 92 MG/DL
PLATELET # BLD AUTO: 221 10E9/L (ref 150–450)
POTASSIUM SERPL-SCNC: 4.5 MMOL/L (ref 3.4–5.3)
PROT SERPL-MCNC: 6.5 G/DL (ref 6.8–8.8)
RBC # BLD AUTO: 3.88 10E12/L (ref 3.8–5.2)
SODIUM SERPL-SCNC: 140 MMOL/L (ref 133–144)
T4 FREE SERPL-MCNC: 1.31 NG/DL (ref 0.76–1.46)
TRIGL SERPL-MCNC: 168 MG/DL
TSH SERPL DL<=0.005 MIU/L-ACNC: 0.07 MU/L (ref 0.4–4)
WBC # BLD AUTO: 7.2 10E9/L (ref 4–11)

## 2021-06-01 PROCEDURE — 85027 COMPLETE CBC AUTOMATED: CPT | Mod: ZL | Performed by: FAMILY MEDICINE

## 2021-06-01 PROCEDURE — 80053 COMPREHEN METABOLIC PANEL: CPT | Mod: ZL | Performed by: FAMILY MEDICINE

## 2021-06-01 PROCEDURE — 99396 PREV VISIT EST AGE 40-64: CPT | Performed by: FAMILY MEDICINE

## 2021-06-01 PROCEDURE — 84439 ASSAY OF FREE THYROXINE: CPT | Mod: ZL | Performed by: FAMILY MEDICINE

## 2021-06-01 PROCEDURE — 36415 COLL VENOUS BLD VENIPUNCTURE: CPT | Mod: ZL | Performed by: FAMILY MEDICINE

## 2021-06-01 PROCEDURE — 84443 ASSAY THYROID STIM HORMONE: CPT | Mod: ZL | Performed by: FAMILY MEDICINE

## 2021-06-01 PROCEDURE — 80061 LIPID PANEL: CPT | Mod: ZL | Performed by: FAMILY MEDICINE

## 2021-06-01 PROCEDURE — G0463 HOSPITAL OUTPT CLINIC VISIT: HCPCS

## 2021-06-01 RX ORDER — FERROUS SULFATE 325(65) MG
TABLET ORAL
Qty: 180 TABLET | Refills: 3 | Status: SHIPPED | OUTPATIENT
Start: 2021-06-01 | End: 2022-06-07

## 2021-06-01 ASSESSMENT — MIFFLIN-ST. JEOR: SCORE: 944.55

## 2021-06-01 ASSESSMENT — PAIN SCALES - GENERAL: PAINLEVEL: NO PAIN (0)

## 2021-06-01 NOTE — TELEPHONE ENCOUNTER
ferrous sulfate (FEROSUL) 325 (65 Fe) MG tablet     Last Written Prescription Date:  6/23/20  Last Fill Quantity: 180,   # refills: 3  Last Office Visit: 12/28/20  Future Office visit:    Next 5 appointments (look out 90 days)    Jun 01, 2021  3:00 PM  (Arrive by 2:45 PM)  PHYSICAL with Clarissa Santos MD  [facility] ([facility] ) [address]  [place] [address]  [phone]           Routing refill request to provider for review/approval

## 2021-06-01 NOTE — NURSING NOTE
"Chief Complaint   Patient presents with     Physical       Initial BP 96/56 (BP Location: Left arm, Patient Position: Sitting, Cuff Size: Adult Regular)   Pulse 71   Temp 97.1  F (36.2  C) (Tympanic)   Resp 16   Ht 1.384 m (4' 6.5\")   Wt 48 kg (105 lb 14.4 oz)   SpO2 98%   BMI 25.07 kg/m   Estimated body mass index is 25.07 kg/m  as calculated from the following:    Height as of this encounter: 1.384 m (4' 6.5\").    Weight as of this encounter: 48 kg (105 lb 14.4 oz).  Medication Reconciliation: complete  Dahlia Javed MA  "

## 2021-06-08 DIAGNOSIS — R13.10 DYSPHAGIA, UNSPECIFIED TYPE: ICD-10-CM

## 2021-06-08 RX ORDER — OMEPRAZOLE 40 MG/1
40 CAPSULE, DELAYED RELEASE ORAL DAILY
Qty: 30 CAPSULE | Refills: 5 | Status: SHIPPED | OUTPATIENT
Start: 2021-06-08 | End: 2021-11-26

## 2021-06-08 NOTE — TELEPHONE ENCOUNTER
omeprazole   Pharmacy change   Last Written Prescription Date:  2/25/21  Last Fill Quantity: 30,   # refills: 5  Last Office Visit: 6/1/21  Future Office visit:       Routing refill request to provider for review/approval because:

## 2021-06-11 ENCOUNTER — ANCILLARY PROCEDURE (OUTPATIENT)
Dept: GENERAL RADIOLOGY | Facility: OTHER | Age: 51
End: 2021-06-11
Attending: ORTHOPAEDIC SURGERY
Payer: MEDICARE

## 2021-06-11 ENCOUNTER — OFFICE VISIT (OUTPATIENT)
Dept: ORTHOPEDICS | Facility: OTHER | Age: 51
End: 2021-06-11
Attending: ORTHOPAEDIC SURGERY
Payer: MEDICARE

## 2021-06-11 VITALS
DIASTOLIC BLOOD PRESSURE: 50 MMHG | HEART RATE: 70 BPM | SYSTOLIC BLOOD PRESSURE: 110 MMHG | TEMPERATURE: 97.7 F | OXYGEN SATURATION: 97 % | WEIGHT: 105 LBS | BODY MASS INDEX: 24.85 KG/M2

## 2021-06-11 DIAGNOSIS — M25.511 CHRONIC RIGHT SHOULDER PAIN: ICD-10-CM

## 2021-06-11 DIAGNOSIS — M19.011 ARTHRITIS OF SHOULDER REGION, RIGHT: Primary | ICD-10-CM

## 2021-06-11 DIAGNOSIS — G89.29 CHRONIC RIGHT SHOULDER PAIN: ICD-10-CM

## 2021-06-11 PROCEDURE — 20610 DRAIN/INJ JOINT/BURSA W/O US: CPT | Performed by: ORTHOPAEDIC SURGERY

## 2021-06-11 PROCEDURE — 73030 X-RAY EXAM OF SHOULDER: CPT | Mod: TC,RT,FY

## 2021-06-11 PROCEDURE — G0463 HOSPITAL OUTPT CLINIC VISIT: HCPCS | Mod: 25

## 2021-06-11 PROCEDURE — G0463 HOSPITAL OUTPT CLINIC VISIT: HCPCS

## 2021-06-11 PROCEDURE — 99213 OFFICE O/P EST LOW 20 MIN: CPT | Mod: 25 | Performed by: ORTHOPAEDIC SURGERY

## 2021-06-11 RX ORDER — LIDOCAINE HYDROCHLORIDE 10 MG/ML
3 INJECTION, SOLUTION INFILTRATION; PERINEURAL ONCE
Status: COMPLETED | OUTPATIENT
Start: 2021-06-11 | End: 2021-06-11

## 2021-06-11 RX ORDER — TRIAMCINOLONE ACETONIDE 40 MG/ML
40 INJECTION, SUSPENSION INTRA-ARTICULAR; INTRAMUSCULAR ONCE
Status: COMPLETED | OUTPATIENT
Start: 2021-06-11 | End: 2021-06-11

## 2021-06-11 RX ADMIN — LIDOCAINE HYDROCHLORIDE 3 ML: 10 INJECTION, SOLUTION INFILTRATION; PERINEURAL at 16:05

## 2021-06-11 RX ADMIN — TRIAMCINOLONE ACETONIDE 40 MG: 40 INJECTION, SUSPENSION INTRA-ARTICULAR; INTRAMUSCULAR at 16:08

## 2021-06-11 ASSESSMENT — PAIN SCALES - GENERAL: PAINLEVEL: MILD PAIN (3)

## 2021-06-11 NOTE — NURSING NOTE
"Chief Complaint   Patient presents with     Musculoskeletal Problem     NPT  Chronic Right Shoulder Pain    xrays first       Initial /50 (BP Location: Left arm, Patient Position: Sitting, Cuff Size: Adult Regular)   Pulse 70   Temp 97.7  F (36.5  C) (Tympanic)   Wt 47.6 kg (105 lb)   SpO2 97%   BMI 24.85 kg/m   Estimated body mass index is 24.85 kg/m  as calculated from the following:    Height as of 6/1/21: 1.384 m (4' 6.5\").    Weight as of this encounter: 47.6 kg (105 lb).  Medication Reconciliation: complete  Phuong Perez LPN  "

## 2021-06-14 DIAGNOSIS — R10.84 ABDOMINAL PAIN, GENERALIZED: ICD-10-CM

## 2021-06-14 NOTE — PROGRESS NOTES
Visit Date: 06/11/2021    SUBJECTIVE:  The patient, Seble, has a history of Down syndrome, here with a care provider and I did briefly talk on the phone with her sister, who is the guardian.  This patient comes in with a history of right shoulder pain.     OBJECTIVE:  On exam, she has a very restricted range of motion of the right shoulder, internal and external rotation.  This is a type of thing that we typically always see with severe arthritis of the glenohumeral joint.  There was mild AC joint arthritis palpably as well, both shoulders.  The left shoulder had excellent range of motion.  She showed me her feet.  On the left foot she had a couple calluses underlying the second metatarsal head, compatible with transfer metatarsalgia.  There is a pretty significant callus on the heel.  I suppose it could be a slight possibility of a wart type of growth, but I think the only way to know that for sure would be to scrape off some of the superficial portion of it to see if there is a vascular blood supply that would be seen with a verrucous or warty type of growth.  The left foot had a significant bunion deformity and a fairly wide forefoot.  A short fourth digit was noted on the right foot and, of course, she does have clinically Down syndrome.    IMAGING STUDIES:  I did look at her x-rays of her right shoulder, three views were obtained, and basically she has got the subacromial space being maintained and severe end-stage arthritis of the glenohumeral joint is noted.    IMPRESSION:   1.  Right shoulder severe glenohumeral joint arthritis.  2.  History of Down syndrome.  3.  Considerable amount of left foot bunion deformity with a wide forefoot.    PLAN:  In this clinical setting this is a type of joint, probably will require shoulder replacement, although I would always be a little hesitant to recommend that the first time I meet a patient such as this as far as their ability to cooperate with therapy, the  postoperative course, etc.  I think the simplest measure today is a cortisone injection in the right shoulder.  This was done with 1 mL of Kenalog, 40 mg, 3 mL of lidocaine.  She tolerated this well after sterile Betadine and alcohol prep.  I am happy to see her periodically for injections.  I guess the real question will be at some point would she likely do well with a shoulder replacement.  The radiographs are severe and it would be indicated on that basis.  The question will be whether she would actively be able to participate with a rehabilitation program, the postoperative course, etc.  Hopefully that answer will be ascertained going forward.  This was discussed with her and her care provider today as well.  I filled out some additional paperwork as well in addition to the injection.      Jonathon Reeves MD        D: 2021   T: 2021   MT: [id number]    Name:     CONSUELO IBARRA  MRN:      [id number]        Account:    [id number]   :      1970           Visit Date: 2021     Document: [id number]

## 2021-06-15 RX ORDER — SIMETHICONE 125 MG/1
CAPSULE, LIQUID FILLED ORAL
Qty: 30 CAPSULE | Refills: 3 | Status: SHIPPED | OUTPATIENT
Start: 2021-06-15

## 2021-06-15 NOTE — TELEPHONE ENCOUNTER
Gas relief      Last Written Prescription Date:  6/20/18  Last Fill Quantity: 30,   # refills: 1  Last Office Visit: 6/1/21  Future Office visit:       Routing refill request to provider for review/approval because:

## 2021-06-28 DIAGNOSIS — E03.9 HYPOTHYROIDISM, UNSPECIFIED TYPE: ICD-10-CM

## 2021-06-28 DIAGNOSIS — F95.2 TOURETTE'S DISORDER: ICD-10-CM

## 2021-06-30 RX ORDER — LEVOTHYROXINE SODIUM 150 UG/1
150 TABLET ORAL DAILY
Qty: 30 TABLET | Refills: 5 | Status: SHIPPED | OUTPATIENT
Start: 2021-06-30 | End: 2021-09-16

## 2021-06-30 RX ORDER — RISPERIDONE 0.25 MG/1
TABLET ORAL
Qty: 90 TABLET | Refills: 5 | Status: SHIPPED | OUTPATIENT
Start: 2021-06-30 | End: 2023-08-07

## 2021-06-30 NOTE — TELEPHONE ENCOUNTER
Synthroid       Last Written Prescription Date:  12/28/2020  Last Fill Quantity: 30,   # refills: 5    Risperdal       Last Written Prescription Date:  11/30/2020  Last Fill Quantity: 90,   # refills: 5  Last Office Visit: 6/1/2021  Future Office visit:

## 2021-07-26 DIAGNOSIS — F63.9 IMPULSE CONTROL DISEASE: ICD-10-CM

## 2021-07-26 DIAGNOSIS — Z72.89 SELF-INJURIOUS BEHAVIOR: ICD-10-CM

## 2021-07-26 DIAGNOSIS — E78.5 HYPERLIPIDEMIA LDL GOAL <100: ICD-10-CM

## 2021-07-27 RX ORDER — GUANFACINE 1 MG/1
0.5 TABLET ORAL 3 TIMES DAILY
Qty: 45 TABLET | Refills: 5 | Status: SHIPPED | OUTPATIENT
Start: 2021-07-27 | End: 2022-02-25

## 2021-07-27 RX ORDER — NALTREXONE HYDROCHLORIDE 50 MG/1
TABLET, FILM COATED ORAL
Qty: 30 TABLET | Refills: 5 | Status: SHIPPED | OUTPATIENT
Start: 2021-07-27 | End: 2022-01-25

## 2021-07-27 RX ORDER — SIMVASTATIN 10 MG
TABLET ORAL
Qty: 30 TABLET | Refills: 5 | Status: SHIPPED | OUTPATIENT
Start: 2021-07-27 | End: 2022-01-24

## 2021-07-27 NOTE — TELEPHONE ENCOUNTER
Tenex       Last Written Prescription Date:  4/22/2021  Last Fill Quantity: 135,   # refills: 1    Zocor       Last Written Prescription Date:  2/25/2021  Last Fill Quantity: 30,   # refills: 5    Depade       Last Written Prescription Date:  1/12/2021  Last Fill Quantity: 30,   # refills: 5  Last Office Visit: 6/1/2021  Future Office visit:

## 2021-08-09 DIAGNOSIS — Z00.00 HEALTH CARE MAINTENANCE: ICD-10-CM

## 2021-08-10 RX ORDER — CHLORHEXIDINE GLUCONATE ORAL RINSE 1.2 MG/ML
SOLUTION DENTAL
Qty: 473 ML | Refills: 11 | Status: SHIPPED | OUTPATIENT
Start: 2021-08-10 | End: 2022-09-09

## 2021-08-10 NOTE — TELEPHONE ENCOUNTER
PERIDEX      --Last Written Prescription Date:  7-7-2020  Last Fill Quantity: 473ML,   # refills: 11  Last Office Visit: 6-1-2021  Future Office visit:       Routing refill request to provider for review/approval because:    TAY

## 2021-09-10 DIAGNOSIS — Q90.9 DOWN'S SYNDROME: ICD-10-CM

## 2021-09-12 NOTE — TELEPHONE ENCOUNTER
BCP      Last Written Prescription Date:  3-  Last Fill Quantity: 91,   # refills: 0  Last Office Visit: 6-1-2021  Future Office visit:       Routing refill request to provider for review/approval because:

## 2021-09-13 DIAGNOSIS — E03.9 ACQUIRED HYPOTHYROIDISM: Primary | ICD-10-CM

## 2021-09-13 RX ORDER — LEVONORGESTREL AND ETHINYL ESTRADIOL 0.15-0.03
KIT ORAL
Qty: 91 TABLET | Refills: 3 | Status: SHIPPED | OUTPATIENT
Start: 2021-09-13 | End: 2022-06-22

## 2021-09-14 ENCOUNTER — LAB (OUTPATIENT)
Dept: LAB | Facility: OTHER | Age: 51
End: 2021-09-14
Attending: FAMILY MEDICINE
Payer: MEDICARE

## 2021-09-14 DIAGNOSIS — E03.9 ACQUIRED HYPOTHYROIDISM: ICD-10-CM

## 2021-09-14 PROCEDURE — 84443 ASSAY THYROID STIM HORMONE: CPT | Mod: ZL

## 2021-09-14 PROCEDURE — 36415 COLL VENOUS BLD VENIPUNCTURE: CPT | Mod: ZL

## 2021-09-14 PROCEDURE — 84439 ASSAY OF FREE THYROXINE: CPT | Mod: ZL

## 2021-09-15 LAB
T4 FREE SERPL-MCNC: 1.15 NG/DL (ref 0.76–1.46)
TSH SERPL DL<=0.005 MIU/L-ACNC: 0.04 MU/L (ref 0.4–4)

## 2021-09-16 DIAGNOSIS — E03.9 HYPOTHYROIDISM, UNSPECIFIED TYPE: ICD-10-CM

## 2021-09-16 RX ORDER — LEVOTHYROXINE SODIUM 137 UG/1
137 TABLET ORAL DAILY
Qty: 30 TABLET | Refills: 5 | Status: SHIPPED | OUTPATIENT
Start: 2021-09-16 | End: 2022-03-22

## 2021-10-03 ENCOUNTER — HEALTH MAINTENANCE LETTER (OUTPATIENT)
Age: 51
End: 2021-10-03

## 2021-11-01 DIAGNOSIS — Z00.00 HEALTHCARE MAINTENANCE: ICD-10-CM

## 2021-11-02 RX ORDER — CHLORAL HYDRATE 500 MG
CAPSULE ORAL
Qty: 100 CAPSULE | Refills: 11 | Status: SHIPPED | OUTPATIENT
Start: 2021-11-02 | End: 2022-12-12

## 2021-11-02 NOTE — TELEPHONE ENCOUNTER
FISH OIL      Last Written Prescription Date:  11-  Last Fill Quantity: 100,   # refills: 11  Last Office Visit: 6-1-2021  Future Office visit:    Next 5 appointments (look out 90 days)    Dec 07, 2021 10:15 AM  (Arrive by 10:00 AM)  SHORT with Clarissa Santos MD  [facility] ([facility] ) [address]  [facility] [address]  [phone]           Routing refill request to provider for review/approval because:

## 2021-11-03 ENCOUNTER — TELEPHONE (OUTPATIENT)
Dept: FAMILY MEDICINE | Facility: OTHER | Age: 51
End: 2021-11-03
Payer: MEDICARE

## 2021-11-03 DIAGNOSIS — Q90.9 DOWN'S SYNDROME: ICD-10-CM

## 2021-11-03 DIAGNOSIS — F91.9 DISTURBANCE OF CONDUCT: Primary | ICD-10-CM

## 2021-11-03 NOTE — TELEPHONE ENCOUNTER
9:45 AM    Reason for Call: Phone Call    Description: pt care giver at the group [facility] needs a referral to see to Dr. Aponte for a neurologist. Please call [facility] @ [phone]    Was an appointment offered for this call? No  If yes : Appointment type              Date    Preferred method for responding to this message: Telephone Call  What is your phone number ? [phone]    If we cannot reach you directly, may we leave a detailed response at the number you provided? Yes    Can this message wait until your PCP/provider returns, if available today? YES    Shasta Rosario

## 2021-11-16 ENCOUNTER — TELEPHONE (OUTPATIENT)
Dept: FAMILY MEDICINE | Facility: OTHER | Age: 51
End: 2021-11-16
Payer: MEDICARE

## 2021-11-16 NOTE — TELEPHONE ENCOUNTER
8:54 AM    Reason for Call: Tylenol Order     Description:     Franci, Supervisor at [facility] calling.     Franci inquiring on order for RT shoulder pain, patient does have a prn order for Tylenol per Standing Order, Guardians are requesting Tylenol to be scheduled.    Please advise.     Was an appointment offered for this call? No  If yes : Appointment type              Date    Preferred method for responding to this message: Telephone Call  What is your phone number ? [phone] (Franci)    If we cannot reach you directly, may we leave a detailed response at the number you provided? Yes    Can this message wait until your PCP/provider returns, if available today?    Not applicable        Merced Quiroz RN

## 2021-11-19 ENCOUNTER — TELEPHONE (OUTPATIENT)
Dept: FAMILY MEDICINE | Facility: OTHER | Age: 51
End: 2021-11-19

## 2021-11-22 NOTE — TELEPHONE ENCOUNTER
Dr Santos cut back on synthroid 9/14/2021 from 150mcg to 137mcg.  She should be taking 137mcg and recheck anytime.  Lab order is placed.

## 2021-11-23 ENCOUNTER — LAB (OUTPATIENT)
Dept: LAB | Facility: OTHER | Age: 51
End: 2021-11-23
Payer: MEDICARE

## 2021-11-23 DIAGNOSIS — E03.9 HYPOTHYROIDISM, UNSPECIFIED TYPE: ICD-10-CM

## 2021-11-23 LAB
T4 FREE SERPL-MCNC: 1.42 NG/DL (ref 0.76–1.46)
TSH SERPL DL<=0.005 MIU/L-ACNC: 0.11 MU/L (ref 0.4–4)

## 2021-11-23 PROCEDURE — 84439 ASSAY OF FREE THYROXINE: CPT | Mod: ZL

## 2021-11-23 PROCEDURE — 36415 COLL VENOUS BLD VENIPUNCTURE: CPT | Mod: ZL

## 2021-11-23 PROCEDURE — 84443 ASSAY THYROID STIM HORMONE: CPT | Mod: ZL

## 2021-11-26 DIAGNOSIS — R13.10 DYSPHAGIA, UNSPECIFIED TYPE: ICD-10-CM

## 2021-11-26 RX ORDER — OMEPRAZOLE 40 MG/1
40 CAPSULE, DELAYED RELEASE ORAL DAILY
Qty: 30 CAPSULE | Refills: 5 | Status: SHIPPED | OUTPATIENT
Start: 2021-11-26 | End: 2022-05-24

## 2021-11-26 NOTE — PROGRESS NOTES
Assessment & Plan     1. Hyperlipidemia, unspecified hyperlipidemia type  No medication changes recommended at this time, labs are generally updated at annual physical.    2. Anxiety  No overall changes, see notes below    3. Impulse control disorder    4. Disturbance of conduct    5. Acquired hypothyroidism  Labs due at annual physical    6. Down's syndrome  Sister declines referral to Neurology now for work-up of likely dementia.  They would like to pursue pain management first, and I agree that this is reasonable.    7. Memory problem  As above.    8. Arthritis of right shoulder region  I recommended against jumping to potent narcotic medications.  Even though her xray shows severe arthritis, this does not equal severe pain persistently.  The side effects of the medication would likely outweigh the benefits at this time.  I recommended we start with scheduled Tylenol with an additional prn dose if patient is displaying signs of pain (grunting, fidgeting, etc).  I would also recommend lidocaine patches, which are very effective at helping with pain.  Follow-up appointment recommended in one month for reevaluation of pain.  Patient is encouraged to leave lidocaine patch in place.  - acetaminophen (TYLENOL) 500 MG tablet; Take 2 tablets (1,000 mg) by mouth 2 times daily. May also take 2 tablets (1,000 mg) daily as needed for mild pain.  Dispense: 100 tablet; Refill: 5  - Lidocaine (LIDOCARE) 4 % Patch; Place 1 patch onto the skin every 24 hours To prevent lidocaine toxicity, patient should be patch free for 12 hrs daily.  Dispense: 30 patch; Refill: 5    9. Bilateral bunions  Referral ordered.  - Orthotics, Prosthetics and Custom Compression Order for DME - ONLY FOR DME      40 minutes spent on the date of the encounter doing chart review, history and exam, documentation and further activities per the note     Return in about 4 weeks (around 12/27/2021) for Medication review.    Clarissa Santos MD  [place]  [facility] ROXI Fischer is a 51 year old who presents for the following health issues     HPI     Hyperlipidemia Follow-Up      Are you regularly taking any medication or supplement to lower your cholesterol?   Yes- simvastatin    Are you having muscle aches or other side effects that you think could be caused by your cholesterol lowering medication?  No    Anxiety Follow-Up    How are you doing with your anxiety since your last visit? No change    Are you having other symptoms that might be associated with anxiety? No    Have you had a significant life event? No     Are you feeling depressed? No    Do you have any concerns with your use of alcohol or other drugs? No    Social History     Tobacco Use     Smoking status: Never Smoker     Smokeless tobacco: Never Used     Tobacco comment: no passive exposure   Substance Use Topics     Alcohol use: No     Drug use: No     TAYLER-7 SCORE 12/28/2020 11/29/2021   Total Score 1 4     PHQ 12/28/2020 11/29/2021   PHQ-9 Total Score 0 0   Q9: Thoughts of better off dead/self-harm past 2 weeks Not at all Not at all       Hypothyroidism Follow-up      Since last visit, patient describes the following symptoms: Weight stable, no hair loss, no skin changes, no constipation, no loose stools      How many servings of fruits and vegetables do you eat daily?  2-3    On average, how many sweetened beverages do you drink each day (Examples: soda, juice, sweet tea, etc.  Do NOT count diet or artificially sweetened beverages)?   0    How many days per week do you exercise enough to make your heart beat faster? 3 or less    How many minutes a day do you exercise enough to make your heart beat faster? 20 - 29    How many days per week do you miss taking your medication? 0    Patient's sister (legal guardian) presents today.  She and her sisters have concern about patient's shoulder pain.  Currently, patient does have prn medication, but due to Down Syndrome and  "likely dementia, patient will deny pain and not ask for medication, even through she is showing signs of pain, such as grunting, fidgeting, and acting out.  Patient's sisters would like to start with more consistent pain control for quality of life, as they would like to hold off on surgery if possible.  Patient's sister does ask about pain patches to provide more consistent pain relief.    Patient does also need referral for orthotics for bunions.      Review of Systems   Constitutional, HEENT, cardiovascular, pulmonary, gi and gu systems are negative, except as otherwise noted.      Objective    /54 (BP Location: Right arm, Patient Position: Chair, Cuff Size: Adult Regular)   Pulse 82   Temp 97.2  F (36.2  C) (Tympanic)   Ht 1.384 m (4' 6.5\")   Wt 45.4 kg (100 lb)   SpO2 98%   BMI 23.67 kg/m    Body mass index is 23.67 kg/m .  Physical Exam   GENERAL: alert and no distress  MS: patient does indicate right shoulder hurts  PSYCH: affect normal/bright            "

## 2021-11-29 ENCOUNTER — OFFICE VISIT (OUTPATIENT)
Dept: FAMILY MEDICINE | Facility: OTHER | Age: 51
End: 2021-11-29
Attending: FAMILY MEDICINE
Payer: MEDICARE

## 2021-11-29 VITALS
OXYGEN SATURATION: 98 % | WEIGHT: 100 LBS | TEMPERATURE: 97.2 F | DIASTOLIC BLOOD PRESSURE: 54 MMHG | BODY MASS INDEX: 23.14 KG/M2 | HEART RATE: 82 BPM | HEIGHT: 55 IN | SYSTOLIC BLOOD PRESSURE: 100 MMHG

## 2021-11-29 DIAGNOSIS — M21.611 BILATERAL BUNIONS: ICD-10-CM

## 2021-11-29 DIAGNOSIS — F41.9 ANXIETY: ICD-10-CM

## 2021-11-29 DIAGNOSIS — E78.5 HYPERLIPIDEMIA, UNSPECIFIED HYPERLIPIDEMIA TYPE: Primary | ICD-10-CM

## 2021-11-29 DIAGNOSIS — E03.9 ACQUIRED HYPOTHYROIDISM: ICD-10-CM

## 2021-11-29 DIAGNOSIS — F63.9 IMPULSE CONTROL DISORDER: ICD-10-CM

## 2021-11-29 DIAGNOSIS — Q90.9 DOWN'S SYNDROME: ICD-10-CM

## 2021-11-29 DIAGNOSIS — Z00.00 HEALTH CARE MAINTENANCE: ICD-10-CM

## 2021-11-29 DIAGNOSIS — M21.612 BILATERAL BUNIONS: ICD-10-CM

## 2021-11-29 DIAGNOSIS — M19.011 ARTHRITIS OF RIGHT SHOULDER REGION: ICD-10-CM

## 2021-11-29 DIAGNOSIS — F91.9 DISTURBANCE OF CONDUCT: ICD-10-CM

## 2021-11-29 DIAGNOSIS — R41.3 MEMORY PROBLEM: ICD-10-CM

## 2021-11-29 PROCEDURE — 99215 OFFICE O/P EST HI 40 MIN: CPT | Performed by: FAMILY MEDICINE

## 2021-11-29 PROCEDURE — G0463 HOSPITAL OUTPT CLINIC VISIT: HCPCS

## 2021-11-29 RX ORDER — ACETAMINOPHEN 500 MG
TABLET ORAL
Qty: 100 TABLET | Refills: 5 | Status: SHIPPED | OUTPATIENT
Start: 2021-11-29 | End: 2022-03-08

## 2021-11-29 RX ORDER — LIDOCAINE 4 G/G
1 PATCH TOPICAL EVERY 24 HOURS
Qty: 30 PATCH | Refills: 5 | Status: SHIPPED | OUTPATIENT
Start: 2021-11-29 | End: 2022-04-28

## 2021-11-29 ASSESSMENT — ANXIETY QUESTIONNAIRES
1. FEELING NERVOUS, ANXIOUS, OR ON EDGE: SEVERAL DAYS
6. BECOMING EASILY ANNOYED OR IRRITABLE: SEVERAL DAYS
IF YOU CHECKED OFF ANY PROBLEMS ON THIS QUESTIONNAIRE, HOW DIFFICULT HAVE THESE PROBLEMS MADE IT FOR YOU TO DO YOUR WORK, TAKE CARE OF THINGS AT HOME, OR GET ALONG WITH OTHER PEOPLE: SOMEWHAT DIFFICULT
GAD7 TOTAL SCORE: 4
7. FEELING AFRAID AS IF SOMETHING AWFUL MIGHT HAPPEN: NOT AT ALL
5. BEING SO RESTLESS THAT IT IS HARD TO SIT STILL: SEVERAL DAYS
3. WORRYING TOO MUCH ABOUT DIFFERENT THINGS: NOT AT ALL
4. TROUBLE RELAXING: SEVERAL DAYS
2. NOT BEING ABLE TO STOP OR CONTROL WORRYING: NOT AT ALL

## 2021-11-29 ASSESSMENT — MIFFLIN-ST. JEOR: SCORE: 912.79

## 2021-11-29 ASSESSMENT — PAIN SCALES - GENERAL: PAINLEVEL: NO PAIN (0)

## 2021-11-29 NOTE — NURSING NOTE
"Chief Complaint   Patient presents with     Lipids     Thyroid Disease       Initial /54 (BP Location: Right arm, Patient Position: Chair, Cuff Size: Adult Regular)   Pulse 82   Temp 97.2  F (36.2  C) (Tympanic)   Ht 1.384 m (4' 6.5\")   Wt 45.4 kg (100 lb)   SpO2 98%   BMI 23.67 kg/m   Estimated body mass index is 23.67 kg/m  as calculated from the following:    Height as of this encounter: 1.384 m (4' 6.5\").    Weight as of this encounter: 45.4 kg (100 lb).  Medication Reconciliation: complete  Angie Quintanilla LPN    "

## 2021-11-30 ASSESSMENT — PATIENT HEALTH QUESTIONNAIRE - PHQ9: SUM OF ALL RESPONSES TO PHQ QUESTIONS 1-9: 0

## 2021-11-30 ASSESSMENT — ANXIETY QUESTIONNAIRES: GAD7 TOTAL SCORE: 4

## 2021-12-02 NOTE — TELEPHONE ENCOUNTER
Calcium      Last Written Prescription Date:  3/8/21  Last Fill Quantity: 100,   # refills: 4  Last Office Visit: 11/29/21  Future Office visit:    Next 5 appointments (look out 90 days)    Dec 28, 2021  2:00 PM  (Arrive by 1:45 PM)  SHORT with Clarissa Santos MD  [facility] ([facility] ) [address]  [facility] [address]  [phone]           Routing refill request to provider for review/approval because:

## 2022-01-05 NOTE — TELEPHONE ENCOUNTER
FOR TUES 11/23/21  To: Nurse to Dr. Grullon from [facility] called to explain that the dose on her Synthroid changed and they did not receive an order.  Also, mentioned that at the 11/29/21 appointment she needs labs done for her thyroid. Any questions please call her back @ [phone].  Thank you     Calcipotriene Counseling:  I discussed with the patient the risks of calcipotriene including but not limited to erythema, scaling, itching, and irritation.

## 2022-01-11 ENCOUNTER — TRANSFERRED RECORDS (OUTPATIENT)
Dept: HEALTH INFORMATION MANAGEMENT | Facility: HOSPITAL | Age: 52
End: 2022-01-11
Payer: MEDICARE

## 2022-01-21 DIAGNOSIS — E78.5 HYPERLIPIDEMIA LDL GOAL <100: ICD-10-CM

## 2022-01-24 RX ORDER — SIMVASTATIN 10 MG
TABLET ORAL
Qty: 30 TABLET | Refills: 4 | Status: SHIPPED | OUTPATIENT
Start: 2022-01-24 | End: 2022-06-23

## 2022-01-25 ENCOUNTER — DOCUMENTATION ONLY (OUTPATIENT)
Dept: OTHER | Facility: CLINIC | Age: 52
End: 2022-01-25

## 2022-01-25 ENCOUNTER — OFFICE VISIT (OUTPATIENT)
Dept: FAMILY MEDICINE | Facility: OTHER | Age: 52
End: 2022-01-25
Attending: FAMILY MEDICINE
Payer: MEDICARE

## 2022-01-25 VITALS
HEART RATE: 65 BPM | RESPIRATION RATE: 16 BRPM | SYSTOLIC BLOOD PRESSURE: 102 MMHG | OXYGEN SATURATION: 98 % | TEMPERATURE: 96.9 F | HEIGHT: 55 IN | WEIGHT: 95.9 LBS | BODY MASS INDEX: 22.19 KG/M2 | DIASTOLIC BLOOD PRESSURE: 54 MMHG

## 2022-01-25 DIAGNOSIS — Q90.9 DOWN'S SYNDROME: ICD-10-CM

## 2022-01-25 DIAGNOSIS — F91.9 DISTURBANCE OF CONDUCT: ICD-10-CM

## 2022-01-25 DIAGNOSIS — F41.9 ANXIETY: Primary | ICD-10-CM

## 2022-01-25 PROCEDURE — 99214 OFFICE O/P EST MOD 30 MIN: CPT | Performed by: FAMILY MEDICINE

## 2022-01-25 PROCEDURE — G0463 HOSPITAL OUTPT CLINIC VISIT: HCPCS | Mod: 25

## 2022-01-25 RX ORDER — SERTRALINE HYDROCHLORIDE 25 MG/1
TABLET, FILM COATED ORAL
Qty: 42 TABLET | Refills: 0 | Status: SHIPPED | OUTPATIENT
Start: 2022-01-25 | End: 2022-03-01

## 2022-01-25 ASSESSMENT — PAIN SCALES - GENERAL: PAINLEVEL: NO PAIN (0)

## 2022-01-25 ASSESSMENT — MIFFLIN-ST. JEOR: SCORE: 894.19

## 2022-01-25 NOTE — NURSING NOTE
"Chief Complaint   Patient presents with     Physical       Initial /54 (BP Location: Right arm, Patient Position: Sitting, Cuff Size: Adult Regular)   Pulse 65   Temp 96.9  F (36.1  C) (Tympanic)   Resp 16   Ht 1.384 m (4' 6.5\")   Wt 43.5 kg (95 lb 14.4 oz)   SpO2 98%   BMI 22.70 kg/m   Estimated body mass index is 22.7 kg/m  as calculated from the following:    Height as of this encounter: 1.384 m (4' 6.5\").    Weight as of this encounter: 43.5 kg (95 lb 14.4 oz).  Medication Reconciliation: complete  Dahlia Javed MA  "

## 2022-01-25 NOTE — PROGRESS NOTES
Assessment & Plan     1. Anxiety  After discussion of options, we will start sertraline and taper dose up.  Follow-up in 4 weeks for medication review.  After dose is titrated appropriately, if patient is still having trouble sleeping, I mentioned changing evening Tylenol dose to Tylenol PM instead.  Staff and sister think that would be a great idea, but we will hold off for now to make sure new medication is tolerated before adding another medication.  - sertraline (ZOLOFT) 25 MG tablet; Take 1 tablet (25 mg) by mouth daily for 14 days, THEN 2 tablets (50 mg) daily for 14 days.  Dispense: 42 tablet; Refill: 0    2. Disturbance of conduct    3. Down's syndrome       Return in about 4 weeks (around 2/22/2022) for Medication review.    Clarissa Santos MD  [facility]      Rebekah Fischer is a 51 year old who presents for the following health issues  accompanied by her sister (guardian) and staff from [facility].    HPI     Anxiety Follow-Up    How are you doing with your anxiety since your last visit? Stable but not well controlled - still having outbursts with housemates, etc    Are you having other symptoms that might be associated with anxiety? Difficulty sleeping, outbursts, pain in shoulder    Have you had a significant life event? No     Are you feeling depressed? No    Do you have any concerns with your use of alcohol or other drugs? No     Sister knows there is likely a component of dementia, as patient was born with Down syndrome.  Sister wonders if a medication like Prozac or Sertraline could possibly help with symptoms.  Patient's other sister takes sertraline and it is very helpful for anxiety symptoms.  Sister and staff also note patient does not sleep well.  She does go to bed early, but she is often talking in her room and does not sleep soundly all night.    Social History     Tobacco Use     Smoking status: Never Smoker     Smokeless tobacco: Never Used     Tobacco  "comment: no passive exposure   Substance Use Topics     Alcohol use: No     Drug use: No     TAYLER-7 SCORE 12/28/2020 11/29/2021   Total Score 1 4     PHQ 12/28/2020 11/29/2021   PHQ-9 Total Score 0 0   Q9: Thoughts of better off dead/self-harm past 2 weeks Not at all Not at all       Review of Systems   Constitutional, HEENT, cardiovascular, pulmonary, gi and gu systems are negative, except as otherwise noted.      Objective    /54 (BP Location: Right arm, Patient Position: Sitting, Cuff Size: Adult Regular)   Pulse 65   Temp 96.9  F (36.1  C) (Tympanic)   Resp 16   Ht 1.384 m (4' 6.5\")   Wt 43.5 kg (95 lb 14.4 oz)   SpO2 98%   BMI 22.70 kg/m    Body mass index is 22.7 kg/m .  Physical Exam   GENERAL: alert and no distress  PSYCH: mentation appears normal, affect normal/bright              "

## 2022-01-25 NOTE — PROGRESS NOTES
"   SUBJECTIVE:   CC: Aaliyah Dean is an 51 year old woman who presents for preventive health visit.     {Split Bill scripting  The purpose of this visit is to discuss your medical history and prevent health problems before you are sick. You may be responsible for a co-pay, coinsurance, or deductible if your visit today includes services such as checking on a sore throat, having an x-ray or lab test, or treating and evaluating a new or existing condition :704777}  Patient has been advised of split billing requirements and indicates understanding: Yes  HPI  {Add if <65 person on Medicare  - Required Questions (Optional):557597}      {additional problems to add (Optional):268782}    Today's PHQ-2 Score: No flowsheet data found.    Abuse: Current or Past (Physical, Sexual or Emotional) - No  Do you feel safe in your environment? Yes        Social History     Tobacco Use     Smoking status: Never Smoker     Smokeless tobacco: Never Used     Tobacco comment: no passive exposure   Substance Use Topics     Alcohol use: No     If you drink alcohol do you typically have >3 drinks per day or >7 drinks per week? No    No flowsheet data found.{add AUDIT responses (Optional) (A score of 7 for adult men is an indication of hazardous drinking; a score of 8 or more is an indication of an alcohol use disorder.  A score of 7 or more for adult women is an indication of hazardous drinking or an alchohol use disorder):978571}    Reviewed orders with patient.  Reviewed health maintenance and updated orders accordingly - Yes  {Chronicprobdata (optional):676214}    Breast Cancer Screening:  Any new diagnosis of family breast, ovarian, or bowel cancer? No    FHS-7: No flowsheet data found.  {If any of the questions to the BCRA (FHS-7) are answered yes, consider ordering referral for genetic counseling (Optional) :157613::\"click delete button to remove this line now\"}  {AMB Mammogram Decision Support (Optional) :303085}  Pertinent " "mammograms are reviewed under the imaging tab.    History of abnormal Pap smear: { :937322}  PAP / HPV 5/4/2016   PAP (Historical) NIL     Reviewed and updated as needed this visit by clinical staff  Tobacco  Allergies  Meds   Med Hx  Surg Hx  Fam Hx  Soc Hx       Reviewed and updated as needed this visit by Provider               {HISTORY OPTIONS (Optional):002973}    Review of Systems  {FEMALE ROS (Optional):412276}     OBJECTIVE:   There were no vitals taken for this visit.  Physical Exam  {Exam Choices (Optional):128123}    {Diagnostic Test Results (Optional):566193::\"Diagnostic Test Results:\",\"Labs reviewed in Epic\"}    ASSESSMENT/PLAN:   {Diag Picklist:180597}    {Patient advised of split billing (Optional):778654}    COUNSELING:  {FEMALE COUNSELING MESSAGES:695514::\"Reviewed preventive health counseling, as reflected in patient instructions\"}    Estimated body mass index is 23.67 kg/m  as calculated from the following:    Height as of 11/29/21: 1.384 m (4' 6.5\").    Weight as of 11/29/21: 45.4 kg (100 lb).    {Weight Management Plan (ACO) Complete if BMI is abnormal-  Ages 18-64  BMI >24.9.  Age 65+ with BMI <23 or >30 (Optional):959012}    She reports that she has never smoked. She has never used smokeless tobacco.      Counseling Resources:  ATP IV Guidelines  Pooled Cohorts Equation Calculator  Breast Cancer Risk Calculator  BRCA-Related Cancer Risk Assessment: FHS-7 Tool  FRAX Risk Assessment  ICSI Preventive Guidelines  Dietary Guidelines for Americans, 2010  USDA's MyPlate  ASA Prophylaxis  Lung CA Screening    Clarissa Santos MD  [facility] [place] [facility]  "

## 2022-02-25 DIAGNOSIS — F63.9 IMPULSE CONTROL DISEASE: ICD-10-CM

## 2022-02-25 RX ORDER — GUANFACINE 1 MG/1
TABLET ORAL
Qty: 45 TABLET | Refills: 0 | Status: SHIPPED | OUTPATIENT
Start: 2022-02-25 | End: 2022-03-22

## 2022-02-28 DIAGNOSIS — F41.9 ANXIETY: ICD-10-CM

## 2022-03-01 NOTE — TELEPHONE ENCOUNTER
Sertraline      Last Written Prescription Date:  1/25/22  Last Fill Quantity: 42,   # refills: 0  Last Office Visit: 1/25/22  Future Office visit:    Next 5 appointments (look out 90 days)    Mar 08, 2022  9:45 AM  (Arrive by 9:30 AM)  SHORT with Clarissa Santos MD  [facility] ([facility] ) [address]  [facility] [address]  [phone]

## 2022-03-08 ENCOUNTER — OFFICE VISIT (OUTPATIENT)
Dept: FAMILY MEDICINE | Facility: OTHER | Age: 52
End: 2022-03-08
Attending: FAMILY MEDICINE
Payer: MEDICARE

## 2022-03-08 VITALS
DIASTOLIC BLOOD PRESSURE: 54 MMHG | HEIGHT: 55 IN | RESPIRATION RATE: 16 BRPM | BODY MASS INDEX: 21.92 KG/M2 | OXYGEN SATURATION: 97 % | SYSTOLIC BLOOD PRESSURE: 100 MMHG | HEART RATE: 88 BPM | WEIGHT: 94.7 LBS | TEMPERATURE: 96.6 F

## 2022-03-08 DIAGNOSIS — M19.011 ARTHRITIS OF RIGHT SHOULDER REGION: ICD-10-CM

## 2022-03-08 DIAGNOSIS — F91.9 DISTURBANCE OF CONDUCT: ICD-10-CM

## 2022-03-08 DIAGNOSIS — F41.9 ANXIETY: Primary | ICD-10-CM

## 2022-03-08 DIAGNOSIS — F63.9 IMPULSE CONTROL DISORDER: ICD-10-CM

## 2022-03-08 PROCEDURE — 99214 OFFICE O/P EST MOD 30 MIN: CPT | Performed by: FAMILY MEDICINE

## 2022-03-08 PROCEDURE — G0463 HOSPITAL OUTPT CLINIC VISIT: HCPCS | Mod: 25

## 2022-03-08 RX ORDER — SERTRALINE HYDROCHLORIDE 100 MG/1
100 TABLET, FILM COATED ORAL DAILY
Qty: 30 TABLET | Refills: 5 | Status: SHIPPED | OUTPATIENT
Start: 2022-03-08 | End: 2022-07-25

## 2022-03-08 RX ORDER — ACETAMINOPHEN 500 MG
TABLET ORAL
Qty: 100 TABLET | Refills: 5 | Status: SHIPPED | OUTPATIENT
Start: 2022-03-08 | End: 2023-01-20

## 2022-03-08 ASSESSMENT — PATIENT HEALTH QUESTIONNAIRE - PHQ9: SUM OF ALL RESPONSES TO PHQ QUESTIONS 1-9: 6

## 2022-03-08 ASSESSMENT — ANXIETY QUESTIONNAIRES
IF YOU CHECKED OFF ANY PROBLEMS ON THIS QUESTIONNAIRE, HOW DIFFICULT HAVE THESE PROBLEMS MADE IT FOR YOU TO DO YOUR WORK, TAKE CARE OF THINGS AT HOME, OR GET ALONG WITH OTHER PEOPLE: SOMEWHAT DIFFICULT
GAD7 TOTAL SCORE: 3
3. WORRYING TOO MUCH ABOUT DIFFERENT THINGS: NOT AT ALL
6. BECOMING EASILY ANNOYED OR IRRITABLE: NEARLY EVERY DAY
5. BEING SO RESTLESS THAT IT IS HARD TO SIT STILL: NOT AT ALL
7. FEELING AFRAID AS IF SOMETHING AWFUL MIGHT HAPPEN: NOT AT ALL
4. TROUBLE RELAXING: NOT AT ALL
2. NOT BEING ABLE TO STOP OR CONTROL WORRYING: NOT AT ALL
1. FEELING NERVOUS, ANXIOUS, OR ON EDGE: NOT AT ALL

## 2022-03-08 ASSESSMENT — PAIN SCALES - GENERAL: PAINLEVEL: NO PAIN (0)

## 2022-03-08 NOTE — PROGRESS NOTES
Assessment & Plan     1. Anxiety  Will increase Sertraline to 100 mg daily, and will change evening scheduled Tylenol to Tylenol PM to see if that helps with sleep.  I hope the combination of the adjustment in the sertraline and having the patient get better sleep will help with behaviors.  Follow-up in one month for medication review.  - sertraline (ZOLOFT) 100 MG tablet; Take 1 tablet (100 mg) by mouth daily  Dispense: 30 tablet; Refill: 5  - diphenhydrAMINE-acetaminophen (TYLENOL PM EXTRA STRENGTH)  MG tablet; Take 2 tablets by mouth At Bedtime  Dispense: 60 tablet; Refill: 5    2. Impulse control disorder  As above     3. Disturbance of conduct  As above.    4. Arthritis of right shoulder region  Changes made to scheduled Tylenol.  - diphenhydrAMINE-acetaminophen (TYLENOL PM EXTRA STRENGTH)  MG tablet; Take 2 tablets by mouth At Bedtime  Dispense: 60 tablet; Refill: 5  - acetaminophen (TYLENOL) 500 MG tablet; Take 2 tablets (1,000 mg) by mouth every morning. May also take 2 tablets (1,000 mg) daily as needed for mild pain.  Dispense: 100 tablet; Refill: 5      Return in about 4 weeks (around 4/5/2022) for Medication review.    Clarissa Santos MD  [facility] [place] [facility] Fischer is a 51 year old who presents for the following health issues  accompanied by staff from her residence.    HPI     Anxiety Follow-Up    How are you doing with your anxiety since your last visit? Improved in some senses and Worsened in others    Are you having other symptoms that might be associated with anxiety? Yes:  change in behavior - see below    Have you had a significant life event? No     Are you feeling depressed? Yes:  changes in behavior such as making noises and not sleeping    Do you have any concerns with your use of alcohol or other drugs? No     Patient was making noises and also having trouble sleeping before medication was started.  Since starting the Sertraline, staff have  seen improvement in aggressive behaviors, but she is still making noises and not sleeping well.  Staff and family wonder if sertraline could possibly be increased, and if something should be started for sleep.    Social History     Tobacco Use     Smoking status: Never Smoker     Smokeless tobacco: Never Used     Tobacco comment: no passive exposure   Substance Use Topics     Alcohol use: No     Drug use: No     TAYLER-7 SCORE 12/28/2020 11/29/2021 3/8/2022   Total Score 1 4 3     PHQ 12/28/2020 11/29/2021 3/8/2022   PHQ-9 Total Score 0 0 6   Q9: Thoughts of better off dead/self-harm past 2 weeks Not at all Not at all Not at all     Last PHQ-9 3/8/2022   1.  Little interest or pleasure in doing things 0   2.  Feeling down, depressed, or hopeless 1   3.  Trouble falling or staying asleep, or sleeping too much 3   4.  Feeling tired or having little energy 2   5.  Poor appetite or overeating 0   6.  Feeling bad about yourself 0   7.  Trouble concentrating 0   8.  Moving slowly or restless 0   Q9: Thoughts of better off dead/self-harm past 2 weeks 0   PHQ-9 Total Score 6   Difficulty at work, home, or with people Somewhat difficult     TAYLER-7  3/8/2022   1. Feeling nervous, anxious, or on edge 0   2. Not being able to stop or control worrying 0   3. Worrying too much about different things 0   4. Trouble relaxing 0   5. Being so restless that it is hard to sit still 0   6. Becoming easily annoyed or irritable 3   7. Feeling afraid, as if something awful might happen 0   TAYLER-7 Total Score 3   If you checked any problems, how difficult have they made it for you to do your work, take care of things at home, or get along with other people? Somewhat difficult         How many servings of fruits and vegetables do you eat daily?  2-3    On average, how many sweetened beverages do you drink each day (Examples: soda, juice, sweet tea, etc.  Do NOT count diet or artificially sweetened beverages)?   1    How many days per week do you  "exercise enough to make your heart beat faster? 3 or less    How many minutes a day do you exercise enough to make your heart beat faster? 9 or less    How many days per week do you miss taking your medication? 0      Review of Systems   Constitutional, HEENT, cardiovascular, pulmonary, gi and gu systems are negative, except as otherwise noted.      Objective    /54 (BP Location: Left arm, Patient Position: Sitting, Cuff Size: Adult Regular)   Pulse 88   Temp (!) 96.6  F (35.9  C) (Tympanic)   Resp 16   Ht 1.365 m (4' 5.74\")   Wt 43 kg (94 lb 11.2 oz)   SpO2 97%   BMI 23.05 kg/m    Body mass index is 23.05 kg/m .  Physical Exam   GENERAL: healthy, alert and no distress  PSYCH: patient does start making loud noises when I enter the room, she then calms and says she is happy and stops making the noises - staff states this is typical            "

## 2022-03-08 NOTE — NURSING NOTE
"Chief Complaint   Patient presents with     Anxiety       Initial /54 (BP Location: Left arm, Patient Position: Sitting, Cuff Size: Adult Regular)   Pulse 88   Temp (!) 96.6  F (35.9  C) (Tympanic)   Resp 16   Ht 1.365 m (4' 5.74\")   Wt 43 kg (94 lb 11.2 oz)   SpO2 97%   BMI 23.05 kg/m   Estimated body mass index is 23.05 kg/m  as calculated from the following:    Height as of this encounter: 1.365 m (4' 5.74\").    Weight as of this encounter: 43 kg (94 lb 11.2 oz).  Medication Reconciliation: complete  Dahlia Javed MA  "

## 2022-03-09 ASSESSMENT — ANXIETY QUESTIONNAIRES: GAD7 TOTAL SCORE: 3

## 2022-03-19 ENCOUNTER — HEALTH MAINTENANCE LETTER (OUTPATIENT)
Age: 52
End: 2022-03-19

## 2022-03-22 DIAGNOSIS — F63.9 IMPULSE CONTROL DISEASE: ICD-10-CM

## 2022-03-22 DIAGNOSIS — E03.9 HYPOTHYROIDISM, UNSPECIFIED TYPE: ICD-10-CM

## 2022-03-22 RX ORDER — GUANFACINE 1 MG/1
TABLET ORAL
Qty: 45 TABLET | Refills: 5 | Status: SHIPPED | OUTPATIENT
Start: 2022-03-22 | End: 2022-08-19

## 2022-03-22 RX ORDER — LEVOTHYROXINE SODIUM 137 UG/1
137 TABLET ORAL DAILY
Qty: 30 TABLET | Refills: 5 | Status: SHIPPED | OUTPATIENT
Start: 2022-03-22 | End: 2022-09-19

## 2022-03-22 NOTE — TELEPHONE ENCOUNTER
TENEX 1mg      Last Written Prescription Date:  2-  Last Fill Quantity: 45,   # refills: 0  Last Office Visit: 3-8-2022  Future Office visit:    Next 5 appointments (look out 90 days)    Apr 08, 2022  8:45 AM  (Arrive by 8:30 AM)  SHORT with Clarissa Santos MD  [facility] ([facility] ) [address]  [place] [address]  [phone]           Routing refill request to provider for review/approval because:  Drug not on the [facility], P or [facility] refill protocol or controlled substance    Synthroid      Last Written Prescription Date:  9-  Last Fill Quantity: 30,   # refills: 5  Last Office Visit: 3-8-2022  Future Office visit:    Next 5 appointments (look out 90 days)    Apr 08, 2022  8:45 AM  (Arrive by 8:30 AM)  [facility] [address]  [place] [address]  [phone]           Routing refill request to provider for review/approval because:

## 2022-04-02 ENCOUNTER — HOSPITAL ENCOUNTER (EMERGENCY)
Facility: HOSPITAL | Age: 52
Discharge: HOME | End: 2022-04-02
Attending: NURSE PRACTITIONER | Admitting: NURSE PRACTITIONER
Payer: MEDICARE

## 2022-04-02 VITALS
DIASTOLIC BLOOD PRESSURE: 50 MMHG | OXYGEN SATURATION: 95 % | SYSTOLIC BLOOD PRESSURE: 92 MMHG | TEMPERATURE: 97.5 F | HEART RATE: 71 BPM | RESPIRATION RATE: 16 BRPM

## 2022-04-02 DIAGNOSIS — K14.8 TONGUE LESION: Primary | ICD-10-CM

## 2022-04-02 PROCEDURE — G0463 HOSPITAL OUTPT CLINIC VISIT: HCPCS

## 2022-04-02 PROCEDURE — 99213 OFFICE O/P EST LOW 20 MIN: CPT | Performed by: NURSE PRACTITIONER

## 2022-04-02 ASSESSMENT — ENCOUNTER SYMPTOMS
TROUBLE SWALLOWING: 0
DIARRHEA: 0
FEVER: 0
SHORTNESS OF BREATH: 0
NAUSEA: 0
PSYCHIATRIC NEGATIVE: 1
VOMITING: 0
SEIZURES: 0
FACIAL SWELLING: 0
CHILLS: 0

## 2022-04-02 NOTE — ED TRIAGE NOTES
Caretaker brings patient to  regarding laceration on the left of tongue. Unaware how it happened. Currently not bleeding, just blistered. Left side of face red.  Denies  pain   lumbar spine hardware/yes(specify)

## 2022-04-02 NOTE — ED PROVIDER NOTES
History     Chief Complaint   Patient presents with     Facial Laceration     HPI  Aaliyah Dean is a 51 year old female who presents to urgent care today (ambulatory) accompanied by Emily who is a nurse at [facility] for complaints of a tongue laceration.  Unsure of when laceration occurred.  Able to eat and drink without difficulty.  Denies any known fall, injury or trauma.  Denies any seizure activity.  Denies any fever, chills, nausea, vomiting, diarrhea, shortness of breath or chest pain.  Staff believes patient may have bit tongue.  Patient calm and content while in urgent care.  No other concerns.    Allergies:  Allergies   Allergen Reactions     Brimonidine Other (See Comments)     Pain, swelling and erythema     Amoxicillin      Cephalexin Monohydrate      Keflex         Problem List:    Patient Active Problem List    Diagnosis Date Noted     Anxiety 06/20/2019     Priority: Medium     ACP (advance care planning) 06/12/2018     Priority: Medium     Allergic rhinitis 11/15/2010     Priority: Medium     Other acne 11/15/2010     Priority: Medium     Impulse control disorder 11/15/2010     Priority: Medium     Dysphagia 11/13/2007     Priority: Medium     Convulsions (H) 05/16/2007     Priority: Medium     Disturbance of conduct 01/05/2006     Priority: Medium     Down's syndrome 06/03/2002     Priority: Medium     Hyperlipidemia 05/09/2002     Priority: Medium     Hypothyroidism 03/21/2001     Priority: Medium        Past Medical History:    Past Medical History:   Diagnosis Date     Allergic rhinitis, cause unspecified 11/15/2010     Down's syndrome 06/03/2002     DYSPHAGIA NOS 11/13/2007     Impulse control disorder, unspecified 11/15/2010     Other acne 11/15/2010     Other and unspecified hyperlipidemia 05/09/2002     Other convulsions 05/16/2007     Unspecified acquired hypothyroidism 03/21/2001     Unspecified disturbance of conduct 01/05/2006       Past Surgical History:    Past Surgical  History:   Procedure Laterality Date     CHOLECYSTECTOMY  01/2011     ENT SURGERY       EXAM UNDER ANESTHESIA PELVIC N/A 5/4/2016    Procedure: EXAM UNDER ANESTHESIA PELVIC;  Surgeon: Valentin Ferris MD;  Location: [facility]     GENITOURINARY SURGERY      INTERNAL URETHROTOMY     HERNIA REPAIR  march 4 2015    ventral herniorrhaphy with intraperitoneal sepramesh     ileostomy take-down  2011     illeostomy  04/25/2011     ventilation tubes, bilateral         Family History:    Family History   Problem Relation Age of Onset     Cancer Mother         cause of death     Diabetes No family hx of        Social History:  Marital Status:  Single [1]  Social History     Tobacco Use     Smoking status: Never Smoker     Smokeless tobacco: Never Used     Tobacco comment: no passive exposure   Substance Use Topics     Alcohol use: No     Drug use: No        Medications:    acetaminophen (TYLENOL) 500 MG tablet  Calcium Carb-Cholecalciferol (CALCIUM-VITAMIN D3) 250-125 MG-UNIT TABS  chlorhexidine (PERIDEX) 0.12 % solution  diphenhydrAMINE-acetaminophen (TYLENOL PM EXTRA STRENGTH)  MG tablet  FEROSUL 325 (65 Fe) MG tablet  fish oil-omega-3 fatty acids 1000 MG capsule  fluticasone (FLONASE) 50 MCG/ACT nasal spray  GAS RELIEF 125 MG CAPS  guanFACINE (TENEX) 1 MG tablet  ibuprofen (ADVIL,MOTRIN) 400 MG tablet  levonorgestrel-ethinyl estradiol (SEASONALE) 0.15-0.03 MG tablet  levothyroxine (SYNTHROID/LEVOTHROID) 137 MCG tablet  Lidocaine (LIDOCARE) 4 % Patch  loratadine (CLARITIN) 10 MG tablet  omeprazole (PRILOSEC) 40 MG DR capsule  risperiDONE (RISPERDAL) 0.25 MG tablet  sertraline (ZOLOFT) 100 MG tablet  simvastatin (ZOCOR) 10 MG tablet  Skin Protectants, Misc. (EUCERIN) cream  triamcinolone (ARISTOCORT HP) 0.5 % external cream  triamcinolone (KENALOG) 0.1 % cream  vitamin C (ASCORBIC ACID) 500 MG tablet      Review of Systems   Constitutional: Negative for chills and fever.   HENT: Negative for congestion, dental problem, ear  pain, facial swelling and trouble swallowing.    Respiratory: Negative for shortness of breath.    Cardiovascular: Negative for chest pain.   Gastrointestinal: Negative for diarrhea, nausea and vomiting.   Musculoskeletal: Negative for gait problem.   Skin:        Tongue laceration, not actively bleeding   Neurological: Negative for seizures.   Psychiatric/Behavioral: Negative.      Physical Exam   BP: 92/50  Pulse: 70  Temp: 97.5  F (36.4  C)  Resp: 16  SpO2: 95 %    Physical Exam  Vitals and nursing note reviewed.   Constitutional:       General: She is not in acute distress.     Appearance: Normal appearance. She is not ill-appearing or toxic-appearing.   HENT:      Head:      Jaw: There is normal jaw occlusion.      Mouth/Throat:      Tongue: Lesions present. Tongue does not deviate from midline.   Cardiovascular:      Rate and Rhythm: Normal rate and regular rhythm.      Pulses: Normal pulses.      Heart sounds: Normal heart sounds.   Pulmonary:      Effort: Pulmonary effort is normal.      Breath sounds: Normal breath sounds.   Neurological:      Mental Status: She is alert.   Psychiatric:         Mood and Affect: Mood normal.           ED Course     No results found for this or any previous visit (from the past 24 hour(s)).    Medications - No data to display    Assessments & Plan (with Medical Decision Making)     I have reviewed the nursing notes.    I have reviewed the findings, diagnosis, plan and need for follow up with the patient.  (K14.8) Tongue lesion  (primary encounter diagnosis)  Plan: Otolaryngology Referral  Patient ambulatory with a nontoxic appearance.  Patient is a very polite individual with Down syndrome who is accompanied by Emily william nurse at [facility] for complaints of a tongue lesion.  Able to eat and drink without difficulty. Nurse believes patient may have bit her tongue while eating too fast.  Upon exam area appears to be more consistent with a tongue lesion verses a healing  laceration while looking at the borders.  Does not appear to be rubbing on teeth.  Denies any fall, injury or trauma.  Denies any seizure activity.  Will send patient to ENT for further evaluation.  Do not see need for prophylactic antibiotics at this time.  Patient to follow-up with primary care provider or return to urgent care-ED with any worsening in condition or additional concerns.  Staff and patient in agreement with treatment plan.      Discharge Medication List as of 4/2/2022 11:29 AM        Final diagnoses:   Tongue lesion     4/2/2022   [facility]     Milena Prater NP  04/02/22 1776

## 2022-04-02 NOTE — ED TRIAGE NOTES
Patient has a laceration on the left side of tongue. Caregiver states patient has not fallen. States they believe it happened while patient was chewing- eats really fast

## 2022-04-02 NOTE — DISCHARGE INSTRUCTIONS
Rinse mouth after eating.    Follow-up with ENT for further evaluation.    Return to urgent care-ED with any worsening in condition or additional concerns.

## 2022-04-27 DIAGNOSIS — M19.011 ARTHRITIS OF RIGHT SHOULDER REGION: ICD-10-CM

## 2022-04-28 RX ORDER — LIDOCAINE 4 G/100G
PATCH TOPICAL
Qty: 30 PATCH | Refills: 5 | Status: SHIPPED | OUTPATIENT
Start: 2022-04-28 | End: 2022-09-20

## 2022-05-21 DIAGNOSIS — R13.10 DYSPHAGIA, UNSPECIFIED TYPE: ICD-10-CM

## 2022-05-24 RX ORDER — OMEPRAZOLE 40 MG/1
40 CAPSULE, DELAYED RELEASE ORAL DAILY
Qty: 30 CAPSULE | Refills: 5 | Status: SHIPPED | OUTPATIENT
Start: 2022-05-24 | End: 2022-11-18

## 2022-06-06 DIAGNOSIS — Z00.00 HEALTHCARE MAINTENANCE: ICD-10-CM

## 2022-06-07 RX ORDER — FERROUS SULFATE 325(65) MG
TABLET ORAL
Qty: 180 TABLET | Refills: 3 | Status: SHIPPED | OUTPATIENT
Start: 2022-06-07 | End: 2023-04-28

## 2022-06-07 NOTE — TELEPHONE ENCOUNTER
Failed protocol    Hemoglobin   Date Value Ref Range Status   06/01/2021 13.2 11.7 - 15.7 g/dL Final   ]  Hematocrit   Date Value Ref Range Status   06/01/2021 39.5 35.0 - 47.0 % Final       FEROSUL 325 (65 Fe) MG tablet     Last Written Prescription Date:  6/1/21  Last Fill Quantity: 180,   # refills: 3  Last Office Visit: 3/8/22  Future Office visit:    Next 5 appointments (look out 90 days)    Jul 07, 2022 10:15 AM  (Arrive by 10:00 AM)  SHORT with Clarissa Santos MD  [facility] ([facility] ) [address]  [facility] [address]  [phone]           Routing refill request to provider for review/approval because:  Drug not on the [facility], [facility] or [facility] refill protocol or controlled substance

## 2022-06-20 DIAGNOSIS — Q90.9 DOWN'S SYNDROME: ICD-10-CM

## 2022-06-22 DIAGNOSIS — E78.5 HYPERLIPIDEMIA LDL GOAL <100: ICD-10-CM

## 2022-06-22 RX ORDER — LEVONORGESTREL AND ETHINYL ESTRADIOL 0.15-0.03
KIT ORAL
Qty: 91 TABLET | Refills: 2 | Status: SHIPPED | OUTPATIENT
Start: 2022-06-22 | End: 2023-03-23

## 2022-06-22 NOTE — TELEPHONE ENCOUNTER
Simvastatin      Last Written Prescription Date:  1/24/2022  Last Fill Quantity: 30,   # refills: 4  Last Office Visit: 3/8/2022  Future Office visit:    Next 5 appointments (look out 90 days)    Jul 07, 2022 10:15 AM  (Arrive by 10:00 AM)  SHORT with Clarissa Santos MD  [facility] ([facility] ) [address]  [facility] [address]  [phone]

## 2022-06-23 RX ORDER — SIMVASTATIN 10 MG
TABLET ORAL
Qty: 30 TABLET | Refills: 11 | Status: SHIPPED | OUTPATIENT
Start: 2022-06-23 | End: 2023-06-22

## 2022-07-09 ENCOUNTER — HEALTH MAINTENANCE LETTER (OUTPATIENT)
Age: 52
End: 2022-07-09

## 2022-07-14 DIAGNOSIS — Z00.00 HEALTH CARE MAINTENANCE: ICD-10-CM

## 2022-07-18 RX ORDER — ASCORBIC ACID 500 MG
TABLET ORAL
Qty: 100 TABLET | Refills: 0 | Status: SHIPPED | OUTPATIENT
Start: 2022-07-18 | End: 2022-11-10

## 2022-07-18 NOTE — TELEPHONE ENCOUNTER
Vit C      Last Written Prescription Date:  4.15.22  Last Fill Quantity: #100,   # refills: 0  Last Office Visit: 3.8.22  Future Office visit:    Next 5 appointments (look out 90 days)    Aug 15, 2022  9:45 AM  (Arrive by 9:30 AM)  SHORT with Clarissa Santos MD  [facility] ([facility] ) [address]  [facility] [address]  [phone]           Routing refill request to provider for review/approval because:  Drug not on the [facility], [facility] or [facility] refill protocol or controlled substance

## 2022-07-21 DIAGNOSIS — F41.9 ANXIETY: ICD-10-CM

## 2022-07-25 DIAGNOSIS — J32.9 CHRONIC SINUSITIS, UNSPECIFIED LOCATION: ICD-10-CM

## 2022-07-25 RX ORDER — SERTRALINE HYDROCHLORIDE 100 MG/1
100 TABLET, FILM COATED ORAL DAILY
Qty: 30 TABLET | Refills: 1 | Status: SHIPPED | OUTPATIENT
Start: 2022-07-25 | End: 2022-09-19

## 2022-07-26 RX ORDER — FLUTICASONE PROPIONATE 50 MCG
SPRAY, SUSPENSION (ML) NASAL
Qty: 16 G | Refills: 3 | Status: SHIPPED | OUTPATIENT
Start: 2022-07-26 | End: 2023-04-20

## 2022-08-08 DIAGNOSIS — F41.9 ANXIETY: ICD-10-CM

## 2022-08-08 DIAGNOSIS — M19.011 ARTHRITIS OF RIGHT SHOULDER REGION: ICD-10-CM

## 2022-08-10 NOTE — TELEPHONE ENCOUNTER
Tylenol PM       Last Written Prescription Date:  Request   Last Fill Quantity: 60,   # refills: 5  Last Office Visit: 7/7/22  Future Office visit:    Next 5 appointments (look out 90 days)    Aug 15, 2022  9:45 AM  (Arrive by 9:30 AM)  SHORT with Clarissa Santos MD  [facility] ([facility] ) [address]  [facility] [address]  [phone]

## 2022-08-11 RX ORDER — ACETAMINOPHEN, DIPHENHYDRAMINE HYDROCHLORIDE 500; 25 MG/1; MG/1
TABLET, FILM COATED ORAL
Qty: 60 TABLET | Refills: 5 | Status: SHIPPED | OUTPATIENT
Start: 2022-08-11 | End: 2023-03-01

## 2022-08-13 NOTE — PROGRESS NOTES
Assessment & Plan     1. Down's syndrome  Referral ordered as requested, could consider medication adjustments listed (seroquel, zyprexa, risperdol adjustment, etc)  - Adult Neurology  Referral    2. Impulse control disorder  - Adult Neurology  Referral    3. Disturbance of conduct  - Adult Neurology  Referral    4. Anxiety    5. Hyperlipidemia, unspecified hyperlipidemia type  Due for physical, would check labs then    6. Acquired hypothyroidism  As above       Return for Physical Exam.    Clarissa Santos MD  [facility] - RAMY Fischer is a 51 year old accompanied by staff from her residence, presenting for the following health issues:  Medication review and behaviors  Lipids, Anxiety, and Thyroid Problem      HPI     Patient presents today with staff for medication review for sertraline.  Staff notes increase in behaviors since the last review - she is lashing out more, swearing, and physically going after people.  Guardian has requested referral to neuro-psych for evaluation.      Anxiety Follow-Up    How are you doing with your anxiety since your last visit? No change    Are you having other symptoms that might be associated with anxiety? No    Have you had a significant life event? No     Are you feeling depressed? No    Do you have any concerns with your use of alcohol or other drugs? No    Social History     Tobacco Use     Smoking status: Never Smoker     Smokeless tobacco: Never Used     Tobacco comment: no passive exposure   Substance Use Topics     Alcohol use: No     Drug use: No     TAYLER-7 SCORE 12/28/2020 11/29/2021 3/8/2022   Total Score 1 4 3     PHQ 12/28/2020 11/29/2021 3/8/2022   PHQ-9 Total Score 0 0 6   Q9: Thoughts of better off dead/self-harm past 2 weeks Not at all Not at all Not at all     Last PHQ-9 3/8/2022   1.  Little interest or pleasure in doing things 0   2.  Feeling down, depressed, or hopeless 1   3.  Trouble falling or  "staying asleep, or sleeping too much 3   4.  Feeling tired or having little energy 2   5.  Poor appetite or overeating 0   6.  Feeling bad about yourself 0   7.  Trouble concentrating 0   8.  Moving slowly or restless 0   Q9: Thoughts of better off dead/self-harm past 2 weeks 0   PHQ-9 Total Score 6   Difficulty at work, home, or with people Somewhat difficult     TAYLER-7  3/8/2022   1. Feeling nervous, anxious, or on edge 0   2. Not being able to stop or control worrying 0   3. Worrying too much about different things 0   4. Trouble relaxing 0   5. Being so restless that it is hard to sit still 0   6. Becoming easily annoyed or irritable 3   7. Feeling afraid, as if something awful might happen 0   TAYLER-7 Total Score 3   If you checked any problems, how difficult have they made it for you to do your work, take care of things at home, or get along with other people? Somewhat difficult       Hyperlipidemia Follow-Up      Are you regularly taking any medication or supplement to lower your cholesterol?   Yes- Zocor    Are you having muscle aches or other side effects that you think could be caused by your cholesterol lowering medication?  No    Hypothyroidism Follow-up      Since last visit, patient describes the following symptoms: Weight stable, no hair loss, no skin changes, no constipation, no loose stools      Patient is overdue for annual physical.      Review of Systems   Constitutional, HEENT, cardiovascular, pulmonary, gi and gu systems are negative, except as otherwise noted.      Objective    /66 (BP Location: Left arm, Patient Position: Sitting, Cuff Size: Adult Regular)   Pulse 76   Temp (!) 96.6  F (35.9  C) (Tympanic)   Ht 1.365 m (4' 5.74\")   Wt 45.7 kg (100 lb 11.2 oz)   SpO2 98%   BMI 24.52 kg/m    Body mass index is 24.52 kg/m .  Physical Exam   GENERAL: alert, no distress and cooperative  PSYCH: mentation appears normal, affect normal/bright                .  ..  "

## 2022-08-15 ENCOUNTER — OFFICE VISIT (OUTPATIENT)
Dept: FAMILY MEDICINE | Facility: OTHER | Age: 52
End: 2022-08-15
Attending: FAMILY MEDICINE
Payer: MEDICARE

## 2022-08-15 VITALS
HEART RATE: 76 BPM | SYSTOLIC BLOOD PRESSURE: 110 MMHG | HEIGHT: 55 IN | BODY MASS INDEX: 23.31 KG/M2 | DIASTOLIC BLOOD PRESSURE: 66 MMHG | WEIGHT: 100.7 LBS | OXYGEN SATURATION: 98 % | TEMPERATURE: 96.6 F

## 2022-08-15 DIAGNOSIS — F41.9 ANXIETY: ICD-10-CM

## 2022-08-15 DIAGNOSIS — F91.9 DISTURBANCE OF CONDUCT: ICD-10-CM

## 2022-08-15 DIAGNOSIS — E03.9 ACQUIRED HYPOTHYROIDISM: ICD-10-CM

## 2022-08-15 DIAGNOSIS — Q90.9 DOWN'S SYNDROME: Primary | ICD-10-CM

## 2022-08-15 DIAGNOSIS — F63.9 IMPULSE CONTROL DISORDER: ICD-10-CM

## 2022-08-15 DIAGNOSIS — E78.5 HYPERLIPIDEMIA, UNSPECIFIED HYPERLIPIDEMIA TYPE: ICD-10-CM

## 2022-08-15 PROCEDURE — 99214 OFFICE O/P EST MOD 30 MIN: CPT | Performed by: FAMILY MEDICINE

## 2022-08-15 ASSESSMENT — PAIN SCALES - GENERAL: PAINLEVEL: NO PAIN (0)

## 2022-08-15 NOTE — NURSING NOTE
"Chief Complaint   Patient presents with     Lipids     Anxiety     Thyroid Problem       Initial /66 (BP Location: Left arm, Patient Position: Sitting, Cuff Size: Adult Regular)   Pulse 76   Temp (!) 96.6  F (35.9  C) (Tympanic)   Ht 1.365 m (4' 5.74\")   Wt 45.7 kg (100 lb 11.2 oz)   SpO2 98%   BMI 24.52 kg/m   Estimated body mass index is 24.52 kg/m  as calculated from the following:    Height as of this encounter: 1.365 m (4' 5.74\").    Weight as of this encounter: 45.7 kg (100 lb 11.2 oz).  Medication Reconciliation: complete  Dahlia Javed MA  "

## 2022-08-17 ENCOUNTER — TELEPHONE (OUTPATIENT)
Dept: FAMILY MEDICINE | Facility: OTHER | Age: 52
End: 2022-08-17

## 2022-08-17 NOTE — TELEPHONE ENCOUNTER
Marianne Kwon [phone]  [facility] nurse  Hany Link [phone]    Please add these names and numbers to the referral that [facility] is placing to [facility] to Dr. Rice and Margie Haile.      Marianne and Hany would like to be called from [facility] for any changes, questions why there is a referral ect. They want to be the contact people for the patient.

## 2022-08-18 DIAGNOSIS — F63.9 IMPULSE CONTROL DISEASE: ICD-10-CM

## 2022-08-19 RX ORDER — GUANFACINE 1 MG/1
TABLET ORAL
Qty: 45 TABLET | Refills: 5 | Status: SHIPPED | OUTPATIENT
Start: 2022-08-19 | End: 2024-04-11

## 2022-08-19 NOTE — TELEPHONE ENCOUNTER
Tenex 1 mg.   Last Written Prescription Date:  3/22/2022  Last Fill Quantity: 45,   # refills: 5  Last Office Visit: 8/15/2022  Future Office visit:

## 2022-09-04 ENCOUNTER — HEALTH MAINTENANCE LETTER (OUTPATIENT)
Age: 52
End: 2022-09-04

## 2022-09-07 DIAGNOSIS — Z00.00 HEALTH CARE MAINTENANCE: ICD-10-CM

## 2022-09-09 RX ORDER — CHLORHEXIDINE GLUCONATE ORAL RINSE 1.2 MG/ML
SOLUTION DENTAL
Qty: 473 ML | Refills: 11 | Status: SHIPPED | OUTPATIENT
Start: 2022-09-09

## 2022-09-09 NOTE — TELEPHONE ENCOUNTER
chlorhexidine gluconate 0.12 % mouthwash  Last Written Prescription Date:  8/10/2021  Last Fill Quantity: 473 ml,   # refills: 11  Last Office Visit: 8/15/2022  Future Office visit:       Routing refill request to provider for review/approval because:

## 2022-09-16 DIAGNOSIS — E03.9 HYPOTHYROIDISM, UNSPECIFIED TYPE: ICD-10-CM

## 2022-09-16 DIAGNOSIS — F41.9 ANXIETY: ICD-10-CM

## 2022-09-19 RX ORDER — SERTRALINE HYDROCHLORIDE 100 MG/1
100 TABLET, FILM COATED ORAL DAILY
Qty: 30 TABLET | Refills: 5 | Status: SHIPPED | OUTPATIENT
Start: 2022-09-19 | End: 2024-04-11

## 2022-09-19 RX ORDER — LEVOTHYROXINE SODIUM 137 UG/1
137 TABLET ORAL DAILY
Qty: 30 TABLET | Refills: 5 | Status: SHIPPED | OUTPATIENT
Start: 2022-09-19 | End: 2023-03-20

## 2022-09-19 NOTE — TELEPHONE ENCOUNTER
Zoloft       Last Written Prescription Date:  7/25/22  Last Fill Quantity: 30,   # refills: 1    Synthroid       Last Written Prescription Date:  3/22/22  Last Fill Quantity: 30,   # refills: 5  Last Office Visit: 8/15/22  Future Office visit:

## 2022-09-20 DIAGNOSIS — M19.011 ARTHRITIS OF RIGHT SHOULDER REGION: ICD-10-CM

## 2022-09-20 RX ORDER — LIDOCAINE 4 G/100G
PATCH TOPICAL
Qty: 30 PATCH | Refills: 5 | Status: SHIPPED | OUTPATIENT
Start: 2022-09-20 | End: 2023-08-07

## 2022-10-03 DIAGNOSIS — Z00.00 HEALTH CARE MAINTENANCE: ICD-10-CM

## 2022-10-04 NOTE — TELEPHONE ENCOUNTER
Calcium Carb-Cholecalciferol      Last Written Prescription Date:  12/2/21  Last Fill Quantity: 100,   # refills: 5  Last Office Visit: 8/15/22  Future Office visit:       Routing refill request to provider for review/approval because:

## 2022-10-25 ENCOUNTER — TRANSFERRED RECORDS (OUTPATIENT)
Dept: HEALTH INFORMATION MANAGEMENT | Facility: CLINIC | Age: 52
End: 2022-10-25

## 2022-11-08 DIAGNOSIS — Z00.00 HEALTH CARE MAINTENANCE: ICD-10-CM

## 2022-11-10 RX ORDER — ASCORBIC ACID 500 MG
TABLET ORAL
Qty: 100 TABLET | Refills: 3 | Status: SHIPPED | OUTPATIENT
Start: 2022-11-10 | End: 2023-12-07

## 2022-11-10 NOTE — TELEPHONE ENCOUNTER
vitamin C (ASCORBIC ACID) 500 MG tablet      Last Written Prescription Date:  7-18-22  Last Fill Quantity: 100,   # refills: 0  Last Office Visit: 8-15-22  Future Office visit:       Routing refill request to provider for review/approval because:  Drug not on the [facility], [facility] or [facility] refill protocol or controlled substance

## 2022-11-16 DIAGNOSIS — R13.10 DYSPHAGIA, UNSPECIFIED TYPE: ICD-10-CM

## 2022-11-17 NOTE — TELEPHONE ENCOUNTER
omeprazole (PRILOSEC) 40 MG DR capsule  Last Written Prescription Date:  5-  Last Fill Quantity: 30,   # refills: 5  Last Office Visit: 8-  Future Office visit:       Routing refill request to provider for review/approval because:  Drug not on the [facility], [facility] or [facility] refill protocol or controlled substance   Patient, Cora Velasquez calling for medication refill. Medication(s) set up as pending orders from medication list.    Caller has been advised that their call does not guarantee an immediate refill. This refill will be reviewed within 72 hours by a qualified provider who will determine whether he or she can refill the medication.    Patient has contacted the pharmacy?  Yes    Patient advised he or she will receive a call back.    Additional information:     Patient is completely out of medications patient is also asking for a refill on amoxicillin (AMOXIL) 500 MG capsule for a sinus infection. Medication was last filled back in march of 2017. Please advise     Patient’s preferred pharmacy has been noted and populated.       [facility] #[id number] - [place], [place] - [address]  [address]  [facility] [address]  Phone: [phone] Fax: [phone]

## 2022-11-18 RX ORDER — OMEPRAZOLE 40 MG/1
40 CAPSULE, DELAYED RELEASE ORAL DAILY
Qty: 30 CAPSULE | Refills: 5 | Status: SHIPPED | OUTPATIENT
Start: 2022-11-18 | End: 2023-05-23

## 2022-12-10 DIAGNOSIS — Z00.00 HEALTHCARE MAINTENANCE: ICD-10-CM

## 2022-12-12 RX ORDER — MULTIVIT,CALC,MINS/IRON/FOLIC 9MG-400MCG
TABLET ORAL
Qty: 100 CAPSULE | Refills: 11 | Status: SHIPPED | OUTPATIENT
Start: 2022-12-12 | End: 2023-12-29

## 2022-12-12 NOTE — TELEPHONE ENCOUNTER
Fish oil      Last Written Prescription Date:  11/2/21  Last Fill Quantity: 100,   # refills: 11  Last Office Visit: 8/15/22  Future Office visit:

## 2023-01-18 DIAGNOSIS — M19.011 ARTHRITIS OF RIGHT SHOULDER REGION: ICD-10-CM

## 2023-01-20 RX ORDER — PSEUDOEPHED/ACETAMINOPH/DIPHEN 30MG-500MG
TABLET ORAL
Qty: 100 TABLET | Refills: 5 | Status: SHIPPED | OUTPATIENT
Start: 2023-01-20 | End: 2024-01-23

## 2023-02-23 DIAGNOSIS — M19.011 ARTHRITIS OF RIGHT SHOULDER REGION: ICD-10-CM

## 2023-02-23 DIAGNOSIS — F41.9 ANXIETY: ICD-10-CM

## 2023-02-27 NOTE — TELEPHONE ENCOUNTER
Tylenol PM       Last Written Prescription Date:  8/11/22  Last Fill Quantity: 60,   # refills: 5  Last Office Visit: 8/15/22  Future Office visit:

## 2023-03-01 RX ORDER — ACETAMINOPHEN, DIPHENHYDRAMINE HYDROCHLORIDE 500; 25 MG/1; MG/1
TABLET, FILM COATED ORAL
Qty: 60 TABLET | Refills: 5 | Status: SHIPPED | OUTPATIENT
Start: 2023-03-01 | End: 2023-09-06

## 2023-03-01 NOTE — TELEPHONE ENCOUNTER
Routing refill request to provider for review/approval because:    Drug not on the [facility] or [facility] refill protocol or controlled substance

## 2023-03-17 DIAGNOSIS — E03.9 HYPOTHYROIDISM, UNSPECIFIED TYPE: ICD-10-CM

## 2023-03-20 DIAGNOSIS — Q90.9 DOWN'S SYNDROME: ICD-10-CM

## 2023-03-20 RX ORDER — LEVOTHYROXINE SODIUM 137 UG/1
137 TABLET ORAL DAILY
Qty: 30 TABLET | Refills: 5 | Status: SHIPPED | OUTPATIENT
Start: 2023-03-20 | End: 2023-09-18

## 2023-03-20 NOTE — TELEPHONE ENCOUNTER
Levothyroxine 137 MCG     Last Written Prescription Date:  9/19/22  Last Fill Quantity: 30,   # refills: 5  Last Office Visit: 8/15/22  Future Office visit:       Routing refill request to provider for review/approval because:  Thyroid Protocol Failed      Normal TSH on file in past 12 months        Recent Labs   Lab Test 11/23/21  0832   TSH 0.11*

## 2023-03-22 NOTE — TELEPHONE ENCOUNTER
levonorgestrel-ethinyl estradiol (SEASONALE) 0.15-0.03 MG tablet      Last Written Prescription Date:  6-22-22  Last Fill Quantity: 91,   # refills: 2  Last Office Visit: 8-15-22  Future Office visit:       Routing refill request to provider for review/approval because:  Drug not on the [facility], [facility] or [facility] refill protocol or controlled substance

## 2023-03-23 RX ORDER — LEVONORGESTREL AND ETHINYL ESTRADIOL 0.15-0.03
KIT ORAL
Qty: 91 TABLET | Refills: 3 | Status: SHIPPED | OUTPATIENT
Start: 2023-03-23 | End: 2024-06-21

## 2023-04-18 DIAGNOSIS — J32.9 CHRONIC SINUSITIS, UNSPECIFIED LOCATION: ICD-10-CM

## 2023-04-20 RX ORDER — FLUTICASONE PROPIONATE 50 MCG
SPRAY, SUSPENSION (ML) NASAL
Qty: 16 G | Refills: 3 | Status: SHIPPED | OUTPATIENT
Start: 2023-04-20 | End: 2024-08-26

## 2023-04-28 DIAGNOSIS — Z00.00 HEALTHCARE MAINTENANCE: ICD-10-CM

## 2023-04-28 RX ORDER — FERROUS SULFATE 325(65) MG
TABLET ORAL
Qty: 180 TABLET | Refills: 3 | Status: SHIPPED | OUTPATIENT
Start: 2023-04-28 | End: 2024-05-17

## 2023-04-28 NOTE — TELEPHONE ENCOUNTER
FEROSUL 325 (65 Fe) MG tablet  Last Written Prescription Date:  6/7/2022  Last Fill Quantity: 180,   # refills: 3  Last Office Visit: 8/15/2022  Future Office visit:       Routing refill request to provider for review/approval because: Iron Supplements Failed    Rerun Protocol (4/28/2023 9:52 AM)    Hgb OR Hct on record within the past 12 mos.    Patient need only have had a HGB or HCT on file in the past 12 mos. That result does not need to be normal.           Recent Labs   Lab Test 06/01/21  1534 12/12/19  1115 09/17/19  1601   HGB 13.2 13.7 13.6         Recent Labs   Lab Test 06/01/21  1534 12/12/19  1115 09/17/19  1601   HCT 39.5 42.3 41.9

## 2023-04-29 ENCOUNTER — HEALTH MAINTENANCE LETTER (OUTPATIENT)
Age: 53
End: 2023-04-29

## 2023-05-19 DIAGNOSIS — R13.10 DYSPHAGIA, UNSPECIFIED TYPE: ICD-10-CM

## 2023-05-23 RX ORDER — OMEPRAZOLE 40 MG/1
40 CAPSULE, DELAYED RELEASE ORAL DAILY
Qty: 30 CAPSULE | Refills: 1 | Status: SHIPPED | OUTPATIENT
Start: 2023-05-23 | End: 2023-07-18

## 2023-06-19 DIAGNOSIS — E78.5 HYPERLIPIDEMIA LDL GOAL <100: ICD-10-CM

## 2023-06-21 NOTE — TELEPHONE ENCOUNTER
simvastatin (ZOCOR) 10 MG tablet 30 tablet 11 6/23/2022     Last Office Visit: 8/15/2022  Future Office visit:       Routing refill request to provider for review/approval because:

## 2023-06-22 RX ORDER — SIMVASTATIN 10 MG
TABLET ORAL
Qty: 30 TABLET | Refills: 11 | Status: SHIPPED | OUTPATIENT
Start: 2023-06-22 | End: 2024-06-11

## 2023-07-17 DIAGNOSIS — R13.10 DYSPHAGIA, UNSPECIFIED TYPE: ICD-10-CM

## 2023-07-18 RX ORDER — OMEPRAZOLE 40 MG/1
40 CAPSULE, DELAYED RELEASE ORAL DAILY
Qty: 30 CAPSULE | Refills: 11 | Status: SHIPPED | OUTPATIENT
Start: 2023-07-18 | End: 2024-07-11

## 2023-07-18 NOTE — TELEPHONE ENCOUNTER
Omeprazole 40 MG DR cap      Last Written Prescription Date:  5-23-23  Last Fill Quantity: 30,   # refills: 1  Last Office Visit: 8-15-22  Future Office visit:    Next 5 appointments (look out 90 days)    Aug 07, 2023  9:30 AM  (Arrive by 9:15 AM)  SHORT with Clarissa Santos MD  [facility] ([facility] ) [address]  [facility] [address]  [phone]

## 2023-08-07 ENCOUNTER — OFFICE VISIT (OUTPATIENT)
Dept: FAMILY MEDICINE | Facility: OTHER | Age: 53
End: 2023-08-07
Attending: FAMILY MEDICINE
Payer: MEDICARE

## 2023-08-07 VITALS
RESPIRATION RATE: 16 BRPM | OXYGEN SATURATION: 98 % | SYSTOLIC BLOOD PRESSURE: 104 MMHG | HEIGHT: 55 IN | WEIGHT: 121.6 LBS | HEART RATE: 83 BPM | BODY MASS INDEX: 28.14 KG/M2 | DIASTOLIC BLOOD PRESSURE: 66 MMHG | TEMPERATURE: 97.5 F

## 2023-08-07 DIAGNOSIS — M19.011 ARTHRITIS OF RIGHT SHOULDER REGION: ICD-10-CM

## 2023-08-07 DIAGNOSIS — Z00.00 ROUTINE GENERAL MEDICAL EXAMINATION AT A HEALTH CARE FACILITY: Primary | ICD-10-CM

## 2023-08-07 DIAGNOSIS — R56.9 CONVULSIONS, UNSPECIFIED CONVULSION TYPE (H): ICD-10-CM

## 2023-08-07 DIAGNOSIS — F63.9 IMPULSE CONTROL DISORDER: ICD-10-CM

## 2023-08-07 DIAGNOSIS — F41.9 ANXIETY: ICD-10-CM

## 2023-08-07 DIAGNOSIS — E78.5 HYPERLIPIDEMIA, UNSPECIFIED HYPERLIPIDEMIA TYPE: ICD-10-CM

## 2023-08-07 DIAGNOSIS — F91.9 DISTURBANCE OF CONDUCT: ICD-10-CM

## 2023-08-07 DIAGNOSIS — E03.9 ACQUIRED HYPOTHYROIDISM: ICD-10-CM

## 2023-08-07 DIAGNOSIS — Q90.9 DOWN'S SYNDROME: ICD-10-CM

## 2023-08-07 LAB
ALBUMIN SERPL BCG-MCNC: 3.9 G/DL (ref 3.5–5.2)
ALP SERPL-CCNC: 89 U/L (ref 35–104)
ALT SERPL W P-5'-P-CCNC: 11 U/L (ref 0–50)
ANION GAP SERPL CALCULATED.3IONS-SCNC: 15 MMOL/L (ref 7–15)
AST SERPL W P-5'-P-CCNC: 22 U/L (ref 0–45)
BILIRUB SERPL-MCNC: 0.2 MG/DL
BUN SERPL-MCNC: 12.4 MG/DL (ref 6–20)
CALCIUM SERPL-MCNC: 8.9 MG/DL (ref 8.6–10)
CHLORIDE SERPL-SCNC: 104 MMOL/L (ref 98–107)
CHOLEST SERPL-MCNC: 135 MG/DL
CREAT SERPL-MCNC: 0.87 MG/DL (ref 0.51–0.95)
DEPRECATED HCO3 PLAS-SCNC: 21 MMOL/L (ref 22–29)
ERYTHROCYTE [DISTWIDTH] IN BLOOD BY AUTOMATED COUNT: 14.4 % (ref 10–15)
GFR SERPL CREATININE-BSD FRML MDRD: 81 ML/MIN/1.73M2
GLUCOSE SERPL-MCNC: 107 MG/DL (ref 70–99)
HCT VFR BLD AUTO: 39.8 % (ref 35–47)
HDLC SERPL-MCNC: 57 MG/DL
HGB BLD-MCNC: 13.2 G/DL (ref 11.7–15.7)
LDLC SERPL CALC-MCNC: 41 MG/DL
MCH RBC QN AUTO: 34.6 PG (ref 26.5–33)
MCHC RBC AUTO-ENTMCNC: 33.2 G/DL (ref 31.5–36.5)
MCV RBC AUTO: 104 FL (ref 78–100)
NONHDLC SERPL-MCNC: 78 MG/DL
PLATELET # BLD AUTO: 222 10E3/UL (ref 150–450)
POTASSIUM SERPL-SCNC: 3.8 MMOL/L (ref 3.4–5.3)
PROT SERPL-MCNC: 6.7 G/DL (ref 6.4–8.3)
RBC # BLD AUTO: 3.82 10E6/UL (ref 3.8–5.2)
SODIUM SERPL-SCNC: 140 MMOL/L (ref 136–145)
TRIGL SERPL-MCNC: 186 MG/DL
TSH SERPL DL<=0.005 MIU/L-ACNC: 3.27 UIU/ML (ref 0.3–4.2)
WBC # BLD AUTO: 6.2 10E3/UL (ref 4–11)

## 2023-08-07 PROCEDURE — 85027 COMPLETE CBC AUTOMATED: CPT | Mod: ZL | Performed by: FAMILY MEDICINE

## 2023-08-07 PROCEDURE — 99213 OFFICE O/P EST LOW 20 MIN: CPT | Mod: 25 | Performed by: FAMILY MEDICINE

## 2023-08-07 PROCEDURE — 36415 COLL VENOUS BLD VENIPUNCTURE: CPT | Mod: ZL | Performed by: FAMILY MEDICINE

## 2023-08-07 PROCEDURE — 84443 ASSAY THYROID STIM HORMONE: CPT | Mod: ZL | Performed by: FAMILY MEDICINE

## 2023-08-07 PROCEDURE — 99396 PREV VISIT EST AGE 40-64: CPT | Performed by: FAMILY MEDICINE

## 2023-08-07 PROCEDURE — 80061 LIPID PANEL: CPT | Mod: ZL | Performed by: FAMILY MEDICINE

## 2023-08-07 PROCEDURE — 80053 COMPREHEN METABOLIC PANEL: CPT | Mod: ZL | Performed by: FAMILY MEDICINE

## 2023-08-07 PROCEDURE — G0463 HOSPITAL OUTPT CLINIC VISIT: HCPCS

## 2023-08-07 RX ORDER — NALTREXONE HYDROCHLORIDE 50 MG/1
50 TABLET, FILM COATED ORAL EVERY MORNING
COMMUNITY
Start: 2023-03-07 | End: 2024-05-02

## 2023-08-07 RX ORDER — LIDOCAINE 4 G/G
PATCH TOPICAL DAILY PRN
Qty: 30 PATCH | Refills: 5 | Status: SHIPPED | OUTPATIENT
Start: 2023-08-07

## 2023-08-07 ASSESSMENT — ENCOUNTER SYMPTOMS
NAUSEA: 0
ARTHRALGIAS: 0
PALPITATIONS: 0
FEVER: 0
PARESTHESIAS: 0
HEMATURIA: 0
HEARTBURN: 0
FREQUENCY: 0
NERVOUS/ANXIOUS: 0
DYSURIA: 0
DIARRHEA: 0
SORE THROAT: 0
CHILLS: 0
CONSTIPATION: 0
HEADACHES: 0
ABDOMINAL PAIN: 0
COUGH: 0
DIZZINESS: 0
MYALGIAS: 0
SHORTNESS OF BREATH: 0
HEMATOCHEZIA: 0
WEAKNESS: 0
BREAST MASS: 0
JOINT SWELLING: 0
EYE PAIN: 0

## 2023-08-07 ASSESSMENT — ACTIVITIES OF DAILY LIVING (ADL)
CURRENT_FUNCTION: MEDICATION ADMINISTRATION REQUIRES ASSISTANCE
CURRENT_FUNCTION: PREPARING MEALS REQUIRES ASSISTANCE
CURRENT_FUNCTION: LAUNDRY REQUIRES ASSISTANCE
CURRENT_FUNCTION: MONEY MANAGEMENT REQUIRES ASSISTANCE
CURRENT_FUNCTION: HOUSEWORK REQUIRES ASSISTANCE
CURRENT_FUNCTION: TRANSPORTATION REQUIRES ASSISTANCE
CURRENT_FUNCTION: BATHING REQUIRES ASSISTANCE

## 2023-08-07 ASSESSMENT — ANXIETY QUESTIONNAIRES
7. FEELING AFRAID AS IF SOMETHING AWFUL MIGHT HAPPEN: NOT AT ALL
GAD7 TOTAL SCORE: 3
1. FEELING NERVOUS, ANXIOUS, OR ON EDGE: NOT AT ALL
2. NOT BEING ABLE TO STOP OR CONTROL WORRYING: NOT AT ALL
3. WORRYING TOO MUCH ABOUT DIFFERENT THINGS: NOT AT ALL
4. TROUBLE RELAXING: NOT AT ALL
6. BECOMING EASILY ANNOYED OR IRRITABLE: NEARLY EVERY DAY
GAD7 TOTAL SCORE: 3
IF YOU CHECKED OFF ANY PROBLEMS ON THIS QUESTIONNAIRE, HOW DIFFICULT HAVE THESE PROBLEMS MADE IT FOR YOU TO DO YOUR WORK, TAKE CARE OF THINGS AT HOME, OR GET ALONG WITH OTHER PEOPLE: VERY DIFFICULT
5. BEING SO RESTLESS THAT IT IS HARD TO SIT STILL: NOT AT ALL

## 2023-08-07 ASSESSMENT — PAIN SCALES - GENERAL: PAINLEVEL: NO PAIN (0)

## 2023-08-07 ASSESSMENT — PATIENT HEALTH QUESTIONNAIRE - PHQ9
10. IF YOU CHECKED OFF ANY PROBLEMS, HOW DIFFICULT HAVE THESE PROBLEMS MADE IT FOR YOU TO DO YOUR WORK, TAKE CARE OF THINGS AT HOME, OR GET ALONG WITH OTHER PEOPLE: VERY DIFFICULT
SUM OF ALL RESPONSES TO PHQ QUESTIONS 1-9: 5
SUM OF ALL RESPONSES TO PHQ QUESTIONS 1-9: 5

## 2023-08-07 NOTE — LETTER
August 11, 2023      Aaliyah Dean  [address]     [facility] [address]        Dear ,    We are writing to inform you of your test results.    Normal/stable labs.     Resulted Orders   TSH with free T4 reflex   Result Value Ref Range    TSH 3.27 0.30 - 4.20 uIU/mL   Lipid Profile (Chol, Trig, HDL, LDL calc)   Result Value Ref Range    Cholesterol 135 <200 mg/dL    Triglycerides 186 (H) <150 mg/dL    Direct Measure HDL 57 >=50 mg/dL    LDL Cholesterol Calculated 41 <=100 mg/dL    Non HDL Cholesterol 78 <130 mg/dL    Narrative    Cholesterol  Desirable:  <200 mg/dL    Triglycerides  Normal:  Less than 150 mg/dL  Borderline High:  150-199 mg/dL  High:  200-499 mg/dL  Very High:  Greater than or equal to 500 mg/dL    Direct Measure HDL  Female:  Greater than or equal to 50 mg/dL   Male:  Greater than or equal to 40 mg/dL    LDL Cholesterol  Desirable:  <100mg/dL  Above Desirable:  100-129 mg/dL   Borderline High:  130-159 mg/dL   High:  160-189 mg/dL   Very High:  >= 190 mg/dL    Non HDL Cholesterol  Desirable:  130 mg/dL  Above Desirable:  130-159 mg/dL  Borderline High:  160-189 mg/dL  High:  190-219 mg/dL  Very High:  Greater than or equal to 220 mg/dL   Comprehensive metabolic panel   Result Value Ref Range    Sodium 140 136 - 145 mmol/L    Potassium 3.8 3.4 - 5.3 mmol/L    Chloride 104 98 - 107 mmol/L    Carbon Dioxide (CO2) 21 (L) 22 - 29 mmol/L    Anion Gap 15 7 - 15 mmol/L    Urea Nitrogen 12.4 6.0 - 20.0 mg/dL    Creatinine 0.87 0.51 - 0.95 mg/dL    Calcium 8.9 8.6 - 10.0 mg/dL    Glucose 107 (H) 70 - 99 mg/dL    Alkaline Phosphatase 89 35 - 104 U/L    AST 22 0 - 45 U/L      Comment:      Reference intervals for this test were updated on 6/12/2023 to more accurately reflect our healthy population. There may be differences in the flagging of prior results with similar values performed with this method. Interpretation of those prior results can be made in the context of the updated  reference intervals.    ALT 11 0 - 50 U/L      Comment:      Reference intervals for this test were updated on 6/12/2023 to more accurately reflect our healthy population. There may be differences in the flagging of prior results with similar values performed with this method. Interpretation of those prior results can be made in the context of the updated reference intervals.      Protein Total 6.7 6.4 - 8.3 g/dL    Albumin 3.9 3.5 - 5.2 g/dL    Bilirubin Total 0.2 <=1.2 mg/dL    GFR Estimate 81 >60 mL/min/1.73m2   CBC with platelets   Result Value Ref Range    WBC Count 6.2 4.0 - 11.0 10e3/uL    RBC Count 3.82 3.80 - 5.20 10e6/uL    Hemoglobin 13.2 11.7 - 15.7 g/dL    Hematocrit 39.8 35.0 - 47.0 %     (H) 78 - 100 fL    MCH 34.6 (H) 26.5 - 33.0 pg    MCHC 33.2 31.5 - 36.5 g/dL    RDW 14.4 10.0 - 15.0 %    Platelet Count 222 150 - 450 10e3/uL       If you have any questions or concerns, please call the clinic at the number listed above.       Sincerely,      Clarissa Santos MD

## 2023-08-07 NOTE — PROGRESS NOTES
"   SUBJECTIVE:   CC: Aaliyah is an 52 year old who presents for preventive health visit.     Healthy Habits:     In general, how would you rate your overall health?  Good    Frequency of exercise:  1 day/week    Duration of exercise:  Less than 15 minutes    Do you usually eat at least 4 servings of fruit and vegetables a day, include whole grains    & fiber and avoid regularly eating high fat or \"junk\" foods?  Yes    Taking medications regularly:  Yes    Medication side effects:  None    Ability to successfully perform activities of daily living:  Transportation requires assistance, preparing meals requires assistance, housework requires assistance, bathing requires assistance, laundry requires assistance, medication administration requires assistance and money management requires assistance    Home Safety:  No safety concerns identified    Hearing Impairment:  No hearing concerns    In the past 6 months, have you been bothered by leaking of urine?  No    In general, how would you rate your overall mental or emotional health?  Fair    Additional concerns today:  No      Today's PHQ-9 Score:       8/7/2023     9:30 AM   PHQ-9 SCORE   PHQ-9 Total Score MyChart 5 (Mild depression)   PHQ-9 Total Score 5       Hyperlipidemia Follow-Up    Are you regularly taking any medication or supplement to lower your cholesterol?   Yes- simvastatin  Are you having muscle aches or other side effects that you think could be caused by your cholesterol lowering medication?  No    Anxiety Follow-Up  How are you doing with your anxiety since your last visit? No change  Are you having other symptoms that might be associated with anxiety? Outbursts, spitting  Have you had a significant life event? No   Are you feeling depressed? No  Do you have any concerns with your use of alcohol or other drugs? No    Social History     Tobacco Use    Smoking status: Never    Smokeless tobacco: Never    Tobacco comments:     no passive exposure "   Substance Use Topics    Alcohol use: No    Drug use: No         11/29/2021     2:00 PM 3/8/2022     9:00 AM 8/7/2023     9:31 AM   TAYLER-7 SCORE   Total Score   3 (minimal anxiety)   Total Score 4 3 3         11/29/2021     2:00 PM 3/8/2022     9:00 AM 8/7/2023     9:30 AM   PHQ   PHQ-9 Total Score 0 6 5   Q9: Thoughts of better off dead/self-harm past 2 weeks Not at all Not at all Not at all       Hypothyroidism Follow-up    Since last visit, patient describes the following symptoms: Weight stable, no hair loss, no skin changes, no constipation, no loose stools      Social History     Tobacco Use    Smoking status: Never    Smokeless tobacco: Never    Tobacco comments:     no passive exposure   Substance Use Topics    Alcohol use: No             8/7/2023     9:33 AM   Alcohol Use   Prescreen: >3 drinks/day or >7 drinks/week? No     Reviewed orders with patient.  Reviewed health maintenance and updated orders accordingly - Yes  Patient Active Problem List   Diagnosis    Down's syndrome    Hypothyroidism    Hyperlipidemia    Disturbance of conduct    Convulsions (H)    Dysphagia    Allergic rhinitis    Other acne    Impulse control disorder    ACP (advance care planning)    Anxiety     Past Surgical History:   Procedure Laterality Date    CHOLECYSTECTOMY  01/2011    ENT SURGERY      EXAM UNDER ANESTHESIA PELVIC N/A 5/4/2016    Procedure: EXAM UNDER ANESTHESIA PELVIC;  Surgeon: Valentin Ferris MD;  Location: [facility]    GENITOURINARY SURGERY      INTERNAL URETHROTOMY    HERNIA REPAIR  march 4 2015    ventral herniorrhaphy with intraperitoneal sepramesh    ileostomy take-down  2011    illeostomy  04/25/2011    ventilation tubes, bilateral         Social History     Tobacco Use    Smoking status: Never    Smokeless tobacco: Never    Tobacco comments:     no passive exposure   Substance Use Topics    Alcohol use: No     Family History   Problem Relation Age of Onset    Cancer Mother         cause of death    Diabetes No  family hx of          Current Outpatient Medications   Medication Sig Dispense Refill    ACETAMINOPHEN EXTRA STRENGTH 500 MG tablet Take 2 tablets (1,000 mg) by mouth every morning. May also take 2 tablets (1,000 mg) daily as needed for mild pain. 100 tablet 5    ACETAMINOPHEN -25 MG tablet Take 2 tablets by mouth At Bedtime 60 tablet 5    Calcium Carb-Cholecalciferol (CALCIUM-VITAMIN D3) 250-125 MG-UNIT TABS TAKE ONE (1) TABLET BY MOUTH TWICE DAILY WITH MEALS 100 tablet 5    chlorhexidine (PERIDEX) 0.12 % solution SWAB ON TEETH AND GUMS FOR 30 SECONDS TWICE A  mL 11    FEROSUL 325 (65 Fe) MG tablet Take 1 tablet (325 mg) by mouth 2 times daily 180 tablet 3    fluticasone (FLONASE) 50 MCG/ACT nasal spray INSTILL 2 SPRAYS INTO BOTH NOSTRILS DAILY SHAKE GENTLY 16 g 3    GAS RELIEF 125 MG CAPS Take 1-2 capsules by mouth 2 times daily as needed 30 capsule 3    guanFACINE (TENEX) 1 MG tablet TAKE 1/2 TABLET BY MOUTH THREE TIMES DAILY (Patient taking differently: Take 1 mg by mouth 2 times daily) 45 tablet 5    ibuprofen (ADVIL,MOTRIN) 400 MG tablet Take 1 tablet by mouth every 4-6 hours as needed 120 tablet 2    levonorgestrel-ethinyl estradiol (SEASONALE) 0.15-0.03 MG tablet TAKE ONE (1) TABLET BY MOUTH DAILY QUASENSE/JOLESSA 91 tablet 3    levothyroxine (SYNTHROID/LEVOTHROID) 137 MCG tablet Take 1 tablet (137 mcg) by mouth daily 30 tablet 5    Lidocaine (GNP LIDOCAINE PAIN RELIEF) 4 % Patch Place onto the skin daily as needed for moderate pain To prevent lidocaine toxicity, patient should be patch free for 12 hrs daily. 30 patch 5    loratadine (CLARITIN) 10 MG tablet Take 1 tablet (10 mg) by mouth daily 30 tablet 10    naltrexone (DEPADE/REVIA) 50 MG tablet Take 50 mg by mouth every morning      Omega-3 Fatty Acids (GNP FISH OIL) 1000 MG CAPS Take 1,000 mg by mouth 3 times daily 100 capsule 11    omeprazole (PRILOSEC) 40 MG DR capsule Take 1 capsule (40 mg) by mouth daily 30 capsule 11    risperiDONE  (RISPERDAL) 0.5 MG tablet       sertraline (ZOLOFT) 100 MG tablet Take 1 tablet (100 mg) by mouth daily 30 tablet 5    simvastatin (ZOCOR) 10 MG tablet TAKE ONE (1) TABLET BY MOUTH DAILY 30 tablet 11    Skin Protectants, Misc. (EUCERIN) cream       triamcinolone (ARISTOCORT HP) 0.5 % external cream Apply topically 2 times daily as needed (eczema on left hand) , may use for three weeks then stop for one week then resume 15 g 2    triamcinolone (KENALOG) 0.1 % cream Apply sparingly to affected area twice daily as needed 80 g 1    vitamin C (ASCORBIC ACID) 500 MG tablet TAKE ONE (1) TABLET BY MOUTH DAILY 100 tablet 3     Allergies   Allergen Reactions    Brimonidine Other (See Comments)     Pain, swelling and erythema    Amoxicillin     Cephalexin Monohydrate      Keflex         Breast Cancer Screenin/7/2023     9:33 AM   Breast CA Risk Assessment (FHS-7)   Do you have a family history of breast, colon, or ovarian cancer? No / Unknown         Mammogram Screening: Recommended annual mammography  Pertinent mammograms are reviewed under the imaging tab.    History of abnormal Pap smear: NO - age 30-65 PAP every 5 years with negative HPV co-testing recommended      2016    12:00 AM   PAP / HPV   PAP (Historical) NIL      Reviewed and updated as needed this visit by clinical staff   Tobacco  Allergies  Meds  Problems  Med Hx  Surg Hx  Fam Hx          Reviewed and updated as needed this visit by Provider   Tobacco  Allergies  Meds  Problems  Med Hx  Surg Hx  Fam Hx           Review of Systems   Constitutional:  Negative for chills and fever.   HENT:  Negative for congestion, ear pain, hearing loss and sore throat.    Eyes:  Negative for pain and visual disturbance.   Respiratory:  Negative for cough and shortness of breath.    Cardiovascular:  Negative for chest pain, palpitations and peripheral edema.   Gastrointestinal:  Negative for abdominal pain, constipation, diarrhea, heartburn,  "hematochezia and nausea.   Breasts:  Negative for tenderness, breast mass and discharge.   Genitourinary:  Negative for dysuria, frequency, genital sores, hematuria, pelvic pain, urgency and vaginal bleeding.   Musculoskeletal:  Negative for arthralgias, joint swelling and myalgias.   Skin:  Negative for rash.   Neurological:  Negative for dizziness, weakness, headaches and paresthesias.   Psychiatric/Behavioral:  Positive for mood changes. The patient is not nervous/anxious.           OBJECTIVE:   /66 (BP Location: Right arm, Patient Position: Sitting, Cuff Size: Adult Regular)   Pulse 83   Temp 97.5  F (36.4  C) (Tympanic)   Resp 16   Ht 1.365 m (4' 5.75\")   Wt 55.2 kg (121 lb 9.6 oz)   SpO2 98%   BMI 29.59 kg/m    Physical Exam  GENERAL: healthy, alert and no distress  EYES: Eyes grossly normal to inspection, PERRL and conjunctivae and sclerae normal  HENT: ear canals and TM's normal, nose and mouth without ulcers or lesions  RESP: lungs clear to auscultation - no rales, rhonchi or wheezes  CV: regular rates and rhythm, normal S1 S2, no S3 or S4, and no murmur, click or rub  ABDOMEN: soft, nontender, no hepatosplenomegaly, no masses and bowel sounds normal  MS: no gross musculoskeletal defects noted, no edema  SKIN: no suspicious lesions or rashes  NEURO: Normal strength and tone, mentation intact and speech normal  PSYCH: affect normal/bright and patient is overall cooperative with exam today    Diagnostic Test Results:  See orders    ASSESSMENT/PLAN:       ICD-10-CM    1. Routine general medical examination at a health care facility  Z00.00       2. Down's syndrome  Q90.9       3. Convulsions, unspecified convulsion type (H)  R56.9 Comprehensive metabolic panel     CBC with platelets     Comprehensive metabolic panel     CBC with platelets      4. Hyperlipidemia, unspecified hyperlipidemia type  E78.5 Lipid Profile (Chol, Trig, HDL, LDL calc)     Comprehensive metabolic panel     Lipid Profile " (Chol, Trig, HDL, LDL calc)     Comprehensive metabolic panel      5. Anxiety  F41.9       6. Disturbance of conduct  F91.9       7. Impulse control disorder  F63.9       8. Acquired hypothyroidism  E03.9 TSH with free T4 reflex     TSH with free T4 reflex      9. Arthritis of right shoulder region  M19.011 Lidocaine (GNP LIDOCAINE PAIN RELIEF) 4 % Patch            COUNSELING:  Reviewed preventive health counseling, as reflected in patient instructions        She reports that she has never smoked. She has never used smokeless tobacco.          Clarissa Santos MD  [facility] submitted by the patient for this visit:  Patient Health Questionnaire (Submitted on 8/7/2023)  If you checked off any problems, how difficult have these problems made it for you to do your work, take care of things at home, or get along with other people?: Very difficult  PHQ9 TOTAL SCORE: 5  TAYLER-7 (Submitted on 8/7/2023)  TAYLER 7 TOTAL SCORE: 3

## 2023-09-05 DIAGNOSIS — F41.9 ANXIETY: ICD-10-CM

## 2023-09-05 DIAGNOSIS — M19.011 ARTHRITIS OF RIGHT SHOULDER REGION: ICD-10-CM

## 2023-09-06 RX ORDER — ACETAMINOPHEN, DIPHENHYDRAMINE HYDROCHLORIDE 500; 25 MG/1; MG/1
TABLET, FILM COATED ORAL
Qty: 60 TABLET | Refills: 5 | Status: SHIPPED | OUTPATIENT
Start: 2023-09-06 | End: 2024-04-24

## 2023-09-06 NOTE — TELEPHONE ENCOUNTER
Tylenol PM      Last Written Prescription Date:  3/1/23  Last Fill Quantity: 60,   # refills: 5  Last Office Visit: 8/7/23  Future Office visit:       Routing refill request to provider for review/approval because:

## 2023-09-16 DIAGNOSIS — E03.9 HYPOTHYROIDISM, UNSPECIFIED TYPE: ICD-10-CM

## 2023-09-18 RX ORDER — LEVOTHYROXINE SODIUM 137 UG/1
137 TABLET ORAL DAILY
Qty: 30 TABLET | Refills: 10 | Status: SHIPPED | OUTPATIENT
Start: 2023-09-18 | End: 2024-08-12

## 2023-09-18 NOTE — TELEPHONE ENCOUNTER
RESPIRATORY NOTE:Start weaning pt on CPAP PS 10, 35%FiO2, pt is tolerating well, and 
understand the weaning plan. No SOB or distress noted this time. RN at bedside. Will 
continue to monitor closely Synthroid      Last Written Prescription Date:  3/20/23  Last Fill Quantity: 30,   # refills: 5  Last Office Visit: 8/7/23  Future Office visit:       Routing refill request to provider for review/approval because:

## 2023-10-16 DIAGNOSIS — Z00.00 HEALTH CARE MAINTENANCE: ICD-10-CM

## 2023-10-17 NOTE — TELEPHONE ENCOUNTER
Calcium Carb-Cholecalciferol (CALCIUM-VITAMIN D3) 250-3.125 MG-MCG TABS   Last Written Prescription Date:  10/5/22  Last Fill Quantity: 100,   # refills: 5  Last Office Visit: 8/7/23  Future Office visit:       Routing refill request to provider for review/approval because:

## 2023-10-18 RX ORDER — CALCIUM CARBONATE-CHOLECALCIFEROL TAB 250 MG-125 UNIT 250-125 MG-UNIT
TAB ORAL
Qty: 100 TABLET | Refills: 5 | Status: SHIPPED | OUTPATIENT
Start: 2023-10-18 | End: 2024-08-21

## 2023-12-07 DIAGNOSIS — Z00.00 HEALTH CARE MAINTENANCE: ICD-10-CM

## 2023-12-07 RX ORDER — ASCORBIC ACID 500 MG
TABLET ORAL
Qty: 100 TABLET | Refills: 1 | Status: SHIPPED | OUTPATIENT
Start: 2023-12-07 | End: 2024-04-24

## 2023-12-07 NOTE — TELEPHONE ENCOUNTER
Vitamin C      Last Written Prescription Date:  11/10/22  Last Fill Quantity: 100,   # refills: 3  Last Office Visit: 08/07/23  Future Office visit:

## 2023-12-29 DIAGNOSIS — Z00.00 HEALTHCARE MAINTENANCE: ICD-10-CM

## 2023-12-29 RX ORDER — CHLORAL HYDRATE 500 MG
1000 CAPSULE ORAL 3 TIMES DAILY
Qty: 100 CAPSULE | Refills: 11 | Status: SHIPPED | OUTPATIENT
Start: 2023-12-29

## 2023-12-29 NOTE — TELEPHONE ENCOUNTER
Omega 3 fish oil      Last Written Prescription Date:  12/12/22  Last Fill Quantity: 100,   # refills: 11  Last Office Visit: 8/7/23  Future Office visit:       Routing refill request to provider for review/approval because:  Vitamin supplement protocol failed.   States medication indicated for associated dx.

## 2023-12-29 NOTE — TELEPHONE ENCOUNTER
Fish oil  Last Written Prescription Date: 12/12/22  Last Fill Quantity: 100 # of Refills: 11  Last Office Visit: 8/7/23

## 2024-01-23 DIAGNOSIS — M19.011 ARTHRITIS OF RIGHT SHOULDER REGION: ICD-10-CM

## 2024-01-23 RX ORDER — PSEUDOEPHED/ACETAMINOPH/DIPHEN 30MG-500MG
TABLET ORAL
Qty: 100 TABLET | Refills: 3 | Status: SHIPPED | OUTPATIENT
Start: 2024-01-23 | End: 2024-08-22

## 2024-01-23 NOTE — TELEPHONE ENCOUNTER
Tylenol      Last Written Prescription Date:  11.30.23  Last Fill Quantity: #100,   # refills: 0  Last Office Visit: 8.7.23  Future Office visit:       Routing refill request to provider for review/approval because:

## 2024-02-06 DIAGNOSIS — F63.9 IMPULSE CONTROL DISORDER: Primary | ICD-10-CM

## 2024-02-06 RX ORDER — RISPERIDONE 0.5 MG/1
0.5 TABLET ORAL 3 TIMES DAILY
Qty: 90 TABLET | Refills: 5 | Status: SHIPPED | OUTPATIENT
Start: 2024-02-06 | End: 2024-06-11

## 2024-02-06 NOTE — TELEPHONE ENCOUNTER
Risperdal       Last Written Prescription Date:  reported by patient   Last Fill Quantity: unknown,   # refills: unknown  Last Office Visit: 08/07/2023  Future Office visit:

## 2024-02-15 NOTE — TELEPHONE ENCOUNTER
triamcinolone (KENALOG) 0.1 % cream   Last Written Prescription Date: 5/16/17  Last Fill Quantity: 80g,  # refills: 1   Last Office Visit with [facility], [facility] or [facility] prescribing provider: 12/8/17                                                [id number]

## 2024-04-11 DIAGNOSIS — F63.9 IMPULSE CONTROL DISEASE: ICD-10-CM

## 2024-04-11 DIAGNOSIS — F41.9 ANXIETY: ICD-10-CM

## 2024-04-11 RX ORDER — SERTRALINE HYDROCHLORIDE 100 MG/1
100 TABLET, FILM COATED ORAL EVERY MORNING
Qty: 30 TABLET | Refills: 3 | Status: SHIPPED | OUTPATIENT
Start: 2024-04-11 | End: 2024-06-11

## 2024-04-11 RX ORDER — GUANFACINE 1 MG/1
1 TABLET ORAL 2 TIMES DAILY
Qty: 60 TABLET | Refills: 5 | Status: SHIPPED | OUTPATIENT
Start: 2024-04-11 | End: 2024-09-09

## 2024-04-11 NOTE — TELEPHONE ENCOUNTER
guanFACINE 1mg      Routing refill request to provider for review/approval because:  Drug not on the [facility], [facility] or [facility] refill protocol or controlled substance

## 2024-04-11 NOTE — TELEPHONE ENCOUNTER
Tenex       Last Written Prescription Date:  8/19/2022  Last Fill Quantity: 45,   # refills: 5  Last Office Visit: 8/07/2023    Zoloft       Last Written Prescription Date:  9/19/2022  Last Fill Quantity: 30,   # refills: 5  Last Office Visit: 08/07/2023

## 2024-04-23 DIAGNOSIS — M19.011 ARTHRITIS OF RIGHT SHOULDER REGION: ICD-10-CM

## 2024-04-23 DIAGNOSIS — Z00.00 HEALTH CARE MAINTENANCE: ICD-10-CM

## 2024-04-23 DIAGNOSIS — F41.9 ANXIETY: ICD-10-CM

## 2024-04-23 NOTE — TELEPHONE ENCOUNTER
Acetaminophen      Last Written Prescription Date:  1/23/24  Last Fill Quantity: 100,   # refills: 3  Last Office Visit: 8/7/23  Future Office visit:       Routing refill request to provider for review/approval because:      Vitamin C      Last Written Prescription Date:  12/7/23  Last Fill Quantity: 100,   # refills: 1  Last Office Visit: 8/7/23  Future Office visit:       Routing refill request to provider for review/approval because:

## 2024-04-24 RX ORDER — ACETAMINOPHEN, DIPHENHYDRAMINE HYDROCHLORIDE 500; 25 MG/1; MG/1
TABLET, FILM COATED ORAL
Qty: 60 TABLET | Refills: 0 | Status: SHIPPED | OUTPATIENT
Start: 2024-04-24 | End: 2024-08-08

## 2024-04-24 RX ORDER — ASCORBIC ACID 500 MG
TABLET ORAL
Qty: 100 TABLET | Refills: 0 | Status: SHIPPED | OUTPATIENT
Start: 2024-04-24

## 2024-04-30 NOTE — PROGRESS NOTES
"  Assessment & Plan     1. Hyperlipidemia, unspecified hyperlipidemia type  No changes to current care plan, physical with labs due this summer.    2. Anxiety  Refilled at previous dose.  If adjustments are needed, will need to go back to [facility] for review, staff expresses understanding.  - naltrexone (DEPADE/REVIA) 50 MG tablet; Take 1 tablet (50 mg) by mouth every morning  Dispense: 30 tablet; Refill: 5    3. Disturbance of conduct  As above  - naltrexone (DEPADE/REVIA) 50 MG tablet; Take 1 tablet (50 mg) by mouth every morning  Dispense: 30 tablet; Refill: 5    4. Impulse control disorder  As above  - naltrexone (DEPADE/REVIA) 50 MG tablet; Take 1 tablet (50 mg) by mouth every morning  Dispense: 30 tablet; Refill: 5    5. Down's syndrome    6. Acquired hypothyroidism  Labs due at physical       The longitudinal plan of care for the diagnosis(es)/condition(s) as documented were addressed during this visit. Due to the added complexity in care, I will continue to support Se[facility] in the subsequent management and with ongoing continuity of care.       BMI  Estimated body mass index is 27.86 kg/m  as calculated from the following:    Height as of 8/7/23: 1.365 m (4' 5.75\").    Weight as of this encounter: 51.9 kg (114 lb 8 oz).     Return in about 3 months (around 8/2/2024) for Physical Exam.      Rebekah Fischer is a 53 year old, presenting for the following health issues:  Lipids, Anxiety, and Thyroid Problem    HPI     Hyperlipidemia Follow-Up    Are you regularly taking any medication or supplement to lower your cholesterol?   Yes- Zocor 10 mg  Are you having muscle aches or other side effects that you think could be caused by your cholesterol lowering medication?  No    Anxiety   How are you doing with your anxiety since your last visit? No change  Are you having other symptoms that might be associated with anxiety? No  Have you had a significant life event? No   Are you feeling depressed? No  Do you have any " concerns with your use of alcohol or other drugs? No  Patient has been following with [facility] for mental health care.  She has been stable with naltrexone for the past 9 months or so.  As she has been stable, she is no longer following with [facility], and staff asks if I can refill the medication.    Social History     Tobacco Use    Smoking status: Never    Smokeless tobacco: Never    Tobacco comments:     no passive exposure   Vaping Use    Vaping status: Never Used   Substance Use Topics    Alcohol use: No    Drug use: No         3/8/2022     9:00 AM 8/7/2023     9:31 AM 5/2/2024    10:08 AM   TAYLER-7 SCORE   Total Score  3 (minimal anxiety) 0 (minimal anxiety)   Total Score 3 3 0         11/29/2021     2:00 PM 3/8/2022     9:00 AM 8/7/2023     9:30 AM   PHQ   PHQ-9 Total Score 0 6 5   Q9: Thoughts of better off dead/self-harm past 2 weeks Not at all Not at all Not at all         8/7/2023     9:30 AM   Last PHQ-9   1.  Little interest or pleasure in doing things 0   2.  Feeling down, depressed, or hopeless 0   3.  Trouble falling or staying asleep, or sleeping too much 2   4.  Feeling tired or having little energy 1   5.  Poor appetite or overeating 2   6.  Feeling bad about yourself 0   7.  Trouble concentrating 0   8.  Moving slowly or restless 0   Q9: Thoughts of better off dead/self-harm past 2 weeks 0   PHQ-9 Total Score 5         5/2/2024    10:08 AM   TAYLER-7    1. Feeling nervous, anxious, or on edge 0   2. Not being able to stop or control worrying 0   3. Worrying too much about different things 0   4. Trouble relaxing 0   5. Being so restless that it is hard to sit still 0   6. Becoming easily annoyed or irritable 0   7. Feeling afraid, as if something awful might happen 0   TAYLER-7 Total Score 0   If you checked any problems, how difficult have they made it for you to do your work, take care of things at home, or get along with other people? Not difficult at all     Hypothyroidism Follow-up    Since last  visit, patient describes the following symptoms: Weight stable, no hair loss, no skin changes, no constipation, no loose stools          Review of Systems  Constitutional, HEENT, cardiovascular, pulmonary, gi and gu systems are negative, except as otherwise noted.      Objective    BP 96/65 (BP Location: Left arm, Patient Position: Sitting, Cuff Size: Adult Regular)   Pulse 72   Temp 97.5  F (36.4  C) (Tympanic)   Resp 16   Wt 51.9 kg (114 lb 8 oz)   LMP  (LMP Unknown)   SpO2 96%   Breastfeeding No   BMI 27.86 kg/m    Body mass index is 27.86 kg/m .  Physical Exam   GENERAL: alert and no distress  PSYCH: mentation appears normal, affect normal/bright          Signed Electronically by: Clarissa Santos MD

## 2024-05-02 ENCOUNTER — OFFICE VISIT (OUTPATIENT)
Dept: FAMILY MEDICINE | Facility: OTHER | Age: 54
End: 2024-05-02
Attending: FAMILY MEDICINE
Payer: MEDICARE

## 2024-05-02 VITALS
RESPIRATION RATE: 16 BRPM | OXYGEN SATURATION: 96 % | TEMPERATURE: 97.5 F | WEIGHT: 114.5 LBS | BODY MASS INDEX: 27.86 KG/M2 | SYSTOLIC BLOOD PRESSURE: 96 MMHG | DIASTOLIC BLOOD PRESSURE: 65 MMHG | HEART RATE: 72 BPM

## 2024-05-02 DIAGNOSIS — Q90.9 DOWN'S SYNDROME: ICD-10-CM

## 2024-05-02 DIAGNOSIS — E78.5 HYPERLIPIDEMIA, UNSPECIFIED HYPERLIPIDEMIA TYPE: Primary | ICD-10-CM

## 2024-05-02 DIAGNOSIS — F91.9 DISTURBANCE OF CONDUCT: ICD-10-CM

## 2024-05-02 DIAGNOSIS — E03.9 ACQUIRED HYPOTHYROIDISM: ICD-10-CM

## 2024-05-02 DIAGNOSIS — F63.9 IMPULSE CONTROL DISORDER: ICD-10-CM

## 2024-05-02 DIAGNOSIS — F41.9 ANXIETY: ICD-10-CM

## 2024-05-02 PROCEDURE — 99214 OFFICE O/P EST MOD 30 MIN: CPT | Performed by: FAMILY MEDICINE

## 2024-05-02 PROCEDURE — G2211 COMPLEX E/M VISIT ADD ON: HCPCS | Performed by: FAMILY MEDICINE

## 2024-05-02 PROCEDURE — G0463 HOSPITAL OUTPT CLINIC VISIT: HCPCS

## 2024-05-02 RX ORDER — NALTREXONE HYDROCHLORIDE 50 MG/1
50 TABLET, FILM COATED ORAL EVERY MORNING
Qty: 30 TABLET | Refills: 5 | Status: SHIPPED | OUTPATIENT
Start: 2024-05-02

## 2024-05-02 ASSESSMENT — ANXIETY QUESTIONNAIRES
IF YOU CHECKED OFF ANY PROBLEMS ON THIS QUESTIONNAIRE, HOW DIFFICULT HAVE THESE PROBLEMS MADE IT FOR YOU TO DO YOUR WORK, TAKE CARE OF THINGS AT HOME, OR GET ALONG WITH OTHER PEOPLE: NOT DIFFICULT AT ALL
6. BECOMING EASILY ANNOYED OR IRRITABLE: NOT AT ALL
7. FEELING AFRAID AS IF SOMETHING AWFUL MIGHT HAPPEN: NOT AT ALL
3. WORRYING TOO MUCH ABOUT DIFFERENT THINGS: NOT AT ALL
GAD7 TOTAL SCORE: 0
7. FEELING AFRAID AS IF SOMETHING AWFUL MIGHT HAPPEN: NOT AT ALL
5. BEING SO RESTLESS THAT IT IS HARD TO SIT STILL: NOT AT ALL
2. NOT BEING ABLE TO STOP OR CONTROL WORRYING: NOT AT ALL
8. IF YOU CHECKED OFF ANY PROBLEMS, HOW DIFFICULT HAVE THESE MADE IT FOR YOU TO DO YOUR WORK, TAKE CARE OF THINGS AT HOME, OR GET ALONG WITH OTHER PEOPLE?: NOT DIFFICULT AT ALL
4. TROUBLE RELAXING: NOT AT ALL
1. FEELING NERVOUS, ANXIOUS, OR ON EDGE: NOT AT ALL
GAD7 TOTAL SCORE: 0

## 2024-05-02 ASSESSMENT — PAIN SCALES - GENERAL: PAINLEVEL: NO PAIN (0)

## 2024-05-16 DIAGNOSIS — Z00.00 HEALTHCARE MAINTENANCE: ICD-10-CM

## 2024-05-16 NOTE — TELEPHONE ENCOUNTER
Ferosul      Last Written Prescription Date:  4/28/23  Last Fill Quantity: 180,   # refills: 3  Last Office Visit: 5/2/24  Future Office visit:       Routing refill request to provider for review/approval because:

## 2024-05-17 RX ORDER — FERROUS SULFATE 325(65) MG
TABLET ORAL
Qty: 180 TABLET | Refills: 3 | Status: SHIPPED | OUTPATIENT
Start: 2024-05-17

## 2024-06-10 DIAGNOSIS — E78.5 HYPERLIPIDEMIA LDL GOAL <100: ICD-10-CM

## 2024-06-10 DIAGNOSIS — F63.9 IMPULSE CONTROL DISORDER: ICD-10-CM

## 2024-06-10 DIAGNOSIS — F41.9 ANXIETY: ICD-10-CM

## 2024-06-10 NOTE — TELEPHONE ENCOUNTER
Simvastatin  Last Written Prescription Date: 6/22/23  Last Fill Quantity: 30 # of Refills: 11  Last Office Visit: 5/2/24    Risperdal  Last Written Prescription Date: 2/6/24  Last Fill Quantity: 90 # of Refills: 5  Last Office Visit: 5/2/24    Zoloft  Last Written Prescription Date: 4/11/24  Last Fill Quantity: 30 # of Refills: 3  Last Office Visit: 5/2/24

## 2024-06-11 RX ORDER — SIMVASTATIN 10 MG
TABLET ORAL
Qty: 30 TABLET | Refills: 10 | Status: SHIPPED | OUTPATIENT
Start: 2024-06-11

## 2024-06-11 RX ORDER — RISPERIDONE 0.5 MG/1
0.5 TABLET ORAL 3 TIMES DAILY
Qty: 90 TABLET | Refills: 3 | Status: SHIPPED | OUTPATIENT
Start: 2024-06-11

## 2024-06-11 RX ORDER — SERTRALINE HYDROCHLORIDE 100 MG/1
100 TABLET, FILM COATED ORAL EVERY MORNING
Qty: 30 TABLET | Refills: 4 | Status: SHIPPED | OUTPATIENT
Start: 2024-06-11

## 2024-06-20 DIAGNOSIS — Q90.9 DOWN'S SYNDROME: ICD-10-CM

## 2024-06-20 NOTE — TELEPHONE ENCOUNTER
levonorgestrel 0.15 mg-ethinyl estradiol 30 mcg tablets,3 mos pack(91)             Last Written Prescription Date:  3/23/23  Last Fill Quantity: 91,   # refills: 3  Last Office Visit: 5/2/24  Future Office visit:       Routing refill request to provider for review/approval because:    Contraceptives Protocol [id number]/20/2024 09:47 AM   Protocol Details Medication indicated for associated diagnosis

## 2024-06-21 RX ORDER — LEVONORGESTREL AND ETHINYL ESTRADIOL 0.15-0.03
KIT ORAL
Qty: 91 TABLET | Refills: 3 | Status: SHIPPED | OUTPATIENT
Start: 2024-06-21

## 2024-07-09 DIAGNOSIS — R13.10 DYSPHAGIA, UNSPECIFIED TYPE: ICD-10-CM

## 2024-07-10 NOTE — TELEPHONE ENCOUNTER
Prilosec       Last Written Prescription Date:  7/18/2023  Last Fill Quantity: 30,   # refills: 11  Last Office Visit: 5/02/2024  Future Office visit:

## 2024-07-10 NOTE — TELEPHONE ENCOUNTER
PPI Protocol [id number]/2024 04:07 PM   Protocol Details Medication indicated for associated diagnosis

## 2024-07-11 RX ORDER — OMEPRAZOLE 40 MG/1
40 CAPSULE, DELAYED RELEASE ORAL DAILY
Qty: 30 CAPSULE | Refills: 11 | Status: SHIPPED | OUTPATIENT
Start: 2024-07-11

## 2024-08-06 DIAGNOSIS — F41.9 ANXIETY: ICD-10-CM

## 2024-08-06 DIAGNOSIS — M19.011 ARTHRITIS OF RIGHT SHOULDER REGION: ICD-10-CM

## 2024-08-07 NOTE — TELEPHONE ENCOUNTER
Acetaminophen PM 25 mg-500 mg tablet         Last Written Prescription Date:  4/24/24  Last Fill Quantity: 60,   # refills: 0  Last Office Visit: 5/2/24  Future Office visit:       Routing refill request to provider for review/approval because:  Drug not on the [facility], [facility] or [facility] refill protocol or controlled substance

## 2024-08-08 RX ORDER — ACETAMINOPHEN, DIPHENHYDRAMINE HYDROCHLORIDE 500; 25 MG/1; MG/1
TABLET, FILM COATED ORAL
Qty: 60 TABLET | Refills: 5 | Status: SHIPPED | OUTPATIENT
Start: 2024-08-08

## 2024-08-10 DIAGNOSIS — E03.9 HYPOTHYROIDISM, UNSPECIFIED TYPE: ICD-10-CM

## 2024-08-12 RX ORDER — LEVOTHYROXINE SODIUM 137 UG/1
137 TABLET ORAL DAILY
Qty: 30 TABLET | Refills: 11 | Status: SHIPPED | OUTPATIENT
Start: 2024-08-12

## 2024-08-12 NOTE — TELEPHONE ENCOUNTER
levothyroxine 137 mcg tablet         Last Written Prescription Date:  9/18/23  Last Fill Quantity: 30,   # refills: 10  Last Office Visit: 5/2/24  Future Office visit:       Routing refill request to provider for review/approval because:  Thyroid Protocol Kavixi29/10/2024 11:46 AM   Protocol Details Normal TSH on file in past 12 months     TSH   Date Value Ref Range Status   08/07/2023 3.27 0.30 - 4.20 uIU/mL Final   11/23/2021 0.11 (L) 0.40 - 4.00 mU/L Final   06/01/2021 0.07 (L) 0.40 - 4.00 mU/L Final

## 2024-08-21 DIAGNOSIS — M19.011 ARTHRITIS OF RIGHT SHOULDER REGION: ICD-10-CM

## 2024-08-21 DIAGNOSIS — Z00.00 HEALTH CARE MAINTENANCE: ICD-10-CM

## 2024-08-21 RX ORDER — CALCIUM CARBONATE-CHOLECALCIFEROL TAB 250 MG-125 UNIT 250-125 MG-UNIT
TAB ORAL
Qty: 180 TABLET | Refills: 2 | Status: SHIPPED | OUTPATIENT
Start: 2024-08-21

## 2024-08-21 NOTE — TELEPHONE ENCOUNTER
Tylenol      Last Written Prescription Date:  01/23/24  Last Fill Quantity: 100,   # refills: 3  Last Office Visit: 05/02/24  Future Office visit:

## 2024-08-22 RX ORDER — PSEUDOEPHED/ACETAMINOPH/DIPHEN 30MG-500MG
TABLET ORAL
Qty: 100 TABLET | Refills: 2 | Status: SHIPPED | OUTPATIENT
Start: 2024-08-22

## 2024-08-22 NOTE — TELEPHONE ENCOUNTER
Analgesics (Non-Narcotic Tylenol and ASA Only) [id number]/21/2024 08:46 AM   Protocol Details Medication matches indication

## 2024-08-26 DIAGNOSIS — J32.9 CHRONIC SINUSITIS, UNSPECIFIED LOCATION: ICD-10-CM

## 2024-08-26 RX ORDER — FLUTICASONE PROPIONATE 50 MCG
SPRAY, SUSPENSION (ML) NASAL
Qty: 16 G | Refills: 5 | Status: SHIPPED | OUTPATIENT
Start: 2024-08-26

## 2024-08-26 NOTE — TELEPHONE ENCOUNTER
Flonase      Last Written Prescription Date:  4/20/23  Last Fill Quantity: 16g,   # refills: 3  Last Office Visit: 5/2/24  Future Office visit:       Routing refill request to provider for review/approval because:

## 2024-09-09 DIAGNOSIS — F63.9 IMPULSE CONTROL DISEASE: ICD-10-CM

## 2024-09-09 RX ORDER — GUANFACINE 1 MG/1
1 TABLET ORAL 2 TIMES DAILY
Qty: 60 TABLET | Refills: 5 | Status: SHIPPED | OUTPATIENT
Start: 2024-09-09

## 2024-09-09 NOTE — TELEPHONE ENCOUNTER
guanfacine 1 mg tablet         Last Written Prescription Date:  4/11/24  Last Fill Quantity: 60,   # refills: 5  Last Office Visit: 5/2/24  Future Office visit:       Routing refill request to provider for review/approval because:  Drug not on the [facility], [facility] or [facility] refill protocol or controlled substance

## 2024-10-03 ENCOUNTER — HOSPITAL ENCOUNTER (EMERGENCY)
Facility: HOSPITAL | Age: 54
Discharge: HOME | End: 2024-10-03
Attending: STUDENT IN AN ORGANIZED HEALTH CARE EDUCATION/TRAINING PROGRAM | Admitting: STUDENT IN AN ORGANIZED HEALTH CARE EDUCATION/TRAINING PROGRAM
Payer: MEDICARE

## 2024-10-03 ENCOUNTER — APPOINTMENT (OUTPATIENT)
Dept: GENERAL RADIOLOGY | Facility: HOSPITAL | Age: 54
End: 2024-10-03
Attending: STUDENT IN AN ORGANIZED HEALTH CARE EDUCATION/TRAINING PROGRAM
Payer: MEDICARE

## 2024-10-03 VITALS
TEMPERATURE: 96.2 F | SYSTOLIC BLOOD PRESSURE: 127 MMHG | HEART RATE: 70 BPM | OXYGEN SATURATION: 98 % | RESPIRATION RATE: 12 BRPM | DIASTOLIC BLOOD PRESSURE: 75 MMHG

## 2024-10-03 DIAGNOSIS — R55 SYNCOPE, UNSPECIFIED SYNCOPE TYPE: ICD-10-CM

## 2024-10-03 DIAGNOSIS — E86.0 DEHYDRATION: ICD-10-CM

## 2024-10-03 LAB
ALBUMIN UR-MCNC: NEGATIVE MG/DL
ANION GAP SERPL CALCULATED.3IONS-SCNC: 10 MMOL/L (ref 7–15)
APPEARANCE UR: ABNORMAL
BACTERIA #/AREA URNS HPF: ABNORMAL /HPF
BASOPHILS # BLD AUTO: 0 10E3/UL (ref 0–0.2)
BASOPHILS NFR BLD AUTO: 1 %
BILIRUB UR QL STRIP: NEGATIVE
BUN SERPL-MCNC: 11.8 MG/DL (ref 6–20)
CALCIUM SERPL-MCNC: 8.8 MG/DL (ref 8.8–10.4)
CHLORIDE SERPL-SCNC: 105 MMOL/L (ref 98–107)
COLOR UR AUTO: ABNORMAL
CREAT SERPL-MCNC: 1.05 MG/DL (ref 0.51–0.95)
EGFRCR SERPLBLD CKD-EPI 2021: 64 ML/MIN/1.73M2
EOSINOPHIL # BLD AUTO: 0 10E3/UL (ref 0–0.7)
EOSINOPHIL NFR BLD AUTO: 0 %
ERYTHROCYTE [DISTWIDTH] IN BLOOD BY AUTOMATED COUNT: 14.9 % (ref 10–15)
FLUAV RNA SPEC QL NAA+PROBE: NEGATIVE
FLUBV RNA RESP QL NAA+PROBE: NEGATIVE
GLUCOSE SERPL-MCNC: 95 MG/DL (ref 70–99)
GLUCOSE UR STRIP-MCNC: NEGATIVE MG/DL
HCO3 SERPL-SCNC: 28 MMOL/L (ref 22–29)
HCT VFR BLD AUTO: 39 % (ref 35–47)
HGB BLD-MCNC: 13 G/DL (ref 11.7–15.7)
HGB UR QL STRIP: NEGATIVE
HOLD SPECIMEN: NORMAL
HYALINE CASTS: 11 /LPF
IMM GRANULOCYTES # BLD: 0 10E3/UL
IMM GRANULOCYTES NFR BLD: 0 %
KETONES UR STRIP-MCNC: NEGATIVE MG/DL
LEUKOCYTE ESTERASE UR QL STRIP: NEGATIVE
LYMPHOCYTES # BLD AUTO: 1.3 10E3/UL (ref 0.8–5.3)
LYMPHOCYTES NFR BLD AUTO: 22 %
MCH RBC QN AUTO: 37.9 PG (ref 26.5–33)
MCHC RBC AUTO-ENTMCNC: 33.3 G/DL (ref 31.5–36.5)
MCV RBC AUTO: 114 FL (ref 78–100)
MONOCYTES # BLD AUTO: 0.3 10E3/UL (ref 0–1.3)
MONOCYTES NFR BLD AUTO: 6 %
MUCOUS THREADS #/AREA URNS LPF: PRESENT /LPF
NEUTROPHILS # BLD AUTO: 4.1 10E3/UL (ref 1.6–8.3)
NEUTROPHILS NFR BLD AUTO: 71 %
NITRATE UR QL: NEGATIVE
NRBC # BLD AUTO: 0 10E3/UL
NRBC BLD AUTO-RTO: 0 /100
PH UR STRIP: 6.5 [PH] (ref 4.7–8)
PLATELET # BLD AUTO: 166 10E3/UL (ref 150–450)
POTASSIUM SERPL-SCNC: 4.5 MMOL/L (ref 3.4–5.3)
RBC # BLD AUTO: 3.43 10E6/UL (ref 3.8–5.2)
RBC URINE: 4 /HPF
RSV RNA SPEC NAA+PROBE: NEGATIVE
SARS-COV-2 RNA RESP QL NAA+PROBE: NEGATIVE
SODIUM SERPL-SCNC: 143 MMOL/L (ref 135–145)
SP GR UR STRIP: 1.01 (ref 1–1.03)
SQUAMOUS EPITHELIAL: 2 /HPF
UROBILINOGEN UR STRIP-MCNC: NORMAL MG/DL
WBC # BLD AUTO: 5.8 10E3/UL (ref 4–11)
WBC URINE: 4 /HPF

## 2024-10-03 PROCEDURE — 96360 HYDRATION IV INFUSION INIT: CPT | Performed by: STUDENT IN AN ORGANIZED HEALTH CARE EDUCATION/TRAINING PROGRAM

## 2024-10-03 PROCEDURE — 93005 ELECTROCARDIOGRAM TRACING: CPT | Performed by: STUDENT IN AN ORGANIZED HEALTH CARE EDUCATION/TRAINING PROGRAM

## 2024-10-03 PROCEDURE — 80048 BASIC METABOLIC PNL TOTAL CA: CPT | Performed by: STUDENT IN AN ORGANIZED HEALTH CARE EDUCATION/TRAINING PROGRAM

## 2024-10-03 PROCEDURE — 36415 COLL VENOUS BLD VENIPUNCTURE: CPT | Performed by: STUDENT IN AN ORGANIZED HEALTH CARE EDUCATION/TRAINING PROGRAM

## 2024-10-03 PROCEDURE — 93010 ELECTROCARDIOGRAM REPORT: CPT | Performed by: INTERNAL MEDICINE

## 2024-10-03 PROCEDURE — 258N000003 HC RX IP 258 OP 636: Performed by: STUDENT IN AN ORGANIZED HEALTH CARE EDUCATION/TRAINING PROGRAM

## 2024-10-03 PROCEDURE — 71046 X-RAY EXAM CHEST 2 VIEWS: CPT

## 2024-10-03 PROCEDURE — 81001 URINALYSIS AUTO W/SCOPE: CPT | Performed by: STUDENT IN AN ORGANIZED HEALTH CARE EDUCATION/TRAINING PROGRAM

## 2024-10-03 PROCEDURE — 85004 AUTOMATED DIFF WBC COUNT: CPT | Performed by: STUDENT IN AN ORGANIZED HEALTH CARE EDUCATION/TRAINING PROGRAM

## 2024-10-03 PROCEDURE — 99285 EMERGENCY DEPT VISIT HI MDM: CPT | Mod: 25 | Performed by: STUDENT IN AN ORGANIZED HEALTH CARE EDUCATION/TRAINING PROGRAM

## 2024-10-03 PROCEDURE — 99284 EMERGENCY DEPT VISIT MOD MDM: CPT | Performed by: STUDENT IN AN ORGANIZED HEALTH CARE EDUCATION/TRAINING PROGRAM

## 2024-10-03 PROCEDURE — 82310 ASSAY OF CALCIUM: CPT | Performed by: STUDENT IN AN ORGANIZED HEALTH CARE EDUCATION/TRAINING PROGRAM

## 2024-10-03 PROCEDURE — 87637 SARSCOV2&INF A&B&RSV AMP PRB: CPT | Performed by: STUDENT IN AN ORGANIZED HEALTH CARE EDUCATION/TRAINING PROGRAM

## 2024-10-03 RX ADMIN — SODIUM CHLORIDE, POTASSIUM CHLORIDE, SODIUM LACTATE AND CALCIUM CHLORIDE 1000 ML: 600; 310; 30; 20 INJECTION, SOLUTION INTRAVENOUS at 15:15

## 2024-10-03 ASSESSMENT — COLUMBIA-SUICIDE SEVERITY RATING SCALE - C-SSRS
2. HAVE YOU ACTUALLY HAD ANY THOUGHTS OF KILLING YOURSELF IN THE PAST MONTH?: NO
1. IN THE PAST MONTH, HAVE YOU WISHED YOU WERE DEAD OR WISHED YOU COULD GO TO SLEEP AND NOT WAKE UP?: NO
6. HAVE YOU EVER DONE ANYTHING, STARTED TO DO ANYTHING, OR PREPARED TO DO ANYTHING TO END YOUR LIFE?: NO

## 2024-10-03 ASSESSMENT — ACTIVITIES OF DAILY LIVING (ADL)
ADLS_ACUITY_SCORE: 37
ADLS_ACUITY_SCORE: 35
ADLS_ACUITY_SCORE: 37

## 2024-10-03 NOTE — ED PROVIDER NOTES
[facility]  ED Provider Note    Chief Complaint   Patient presents with    Syncope     History:  Aaliyah Dean is a 54 year old female with Down syndrome hypothyroidism who presents to the emergency department today complaining of dizziness and syncope that happened when she stood up.  She has no symptoms when she is lying down or moving her head.  She notes that her pee smells bad.  Denies abdominal pain no nausea vomiting or diarrhea.  No reports of fevers.  No other    Review of Systems   Performed; see HPI for pertinent positives and negatives.     Medical history, surgical history, and social history was reviewed.  Nursing documentation, triage note, and vitals were reviewed.    Vitals:  BP: 115/67  Pulse: 64  Temp: (!) 96.2  F (35.7  C)  Resp: 16  SpO2: 95 %    Physical Exam:  Constitutional: Alert and conversant. NAD   HENT: NCAT   Eyes: Normal pupils   Neck: supple   CV: No pallor, regular rate and rhythm no murmurs clicks or rub  Pulmonary/Chest: Non-labored respirations, clear to auscultation bilateral  Abdominal: non-distended soft, nontender no rebound no guarding no pain McBurney's point negative Gibbs sign  MSK: DUQUE.   Neuro: Alert and appropriate   Skin: Warm and dry. No diaphoresis. No rashes on exposed skin    Psych: Appropriate mood and affect       MDM:      ED Course as of 10/03/24 1658   u Oct 03, 2024   1644 Aaliyah Dean is a 54 year old female presenting with transient loss of consciousness.   Vitals wnl   Exam ressuring   Differential includes but is not limited to syncope, hypoglycemia, seizure, SAH/ICH, TIA, MI/ACS, arrythmia (WPW, brugada, Long QT, HOCM), aortic dissection, ruptured AAA, aortic stenosis, toxins/drugs, infection/sepsis, PE, GI bleed/hypovolemia, and orthostatic hypotension. No clinical history consistent with seizure.     Arrhythmia is unlikely.  EKG shows NSR, no interval abnormalities (eg QT prolongation/WPW).  No findings to suggest  Brugada.  Tele reveals no tachycardia or bradycardic dysrhythmia.  HOCM was considered but there are no clear historical elements pointing toward this.  EKG not suggestive.  The QRS voltage is not extremely large and no suggestive Q waves.     BG normal. Patient does not complain of headache so SAH unlikely. Neuro exam unremarkable, low suspicion for acute intracranial bleed or ischemia. EKG with normal intervals and no evidence of ischemia. No tearing pain to back and no chest/abdominal pain so dissection less likely. Hemodynamically stable so less consistent with ruptured AAA. Aortic stenosis possible but less likely given lack of AS hx, patient's age;  no murmur appreciated. PE less likely as patient does not have pleuritic pain, SOB, or ongoing symptoms to suggest massive/submassive PE capable of causing cerebral hypoperfusion and syncope and is low risk, Wells score 0.      Labs reassuring. Imaging: CXR Mild interstitial thickening with subtle nodularity of both lungs. No evidence of sepsis. Most likely etiology of syncope is dehydration.     Patient is appropriate for further outpatient management. Discharged in stable condition with all questions answered and return precautions given. Follow up PCP.   1880 Patient feeling much better after a liter of fluids and some pudding.       Procedures:  Procedures        Impression:  Final diagnoses:   Syncope, unspecified syncope type   Dehydration            Dexter Quintero MD  10/03/24 2197

## 2024-10-03 NOTE — DISCHARGE INSTRUCTIONS
Return to the emergency department for worsening symptoms or new concerning symptoms.  Follow-up with your primary care provider within 1 to 2 weeks stay well-hydrated.,  Call to schedule an appointment.

## 2024-10-03 NOTE — ED TRIAGE NOTES
Pt comes in via [facility] for Dizziness and syncope. Pt states that she has no pain. Pt does state that her pee stinks, but it doesn't hurt to pee.

## 2024-10-03 NOTE — ED NOTES
Bed: ED11a  Expected date:   Expected time:   Means of arrival:   Comments:  [facility]

## 2024-10-04 LAB
ATRIAL RATE - MUSE: 66 BPM
DIASTOLIC BLOOD PRESSURE - MUSE: NORMAL MMHG
INTERPRETATION ECG - MUSE: NORMAL
P AXIS - MUSE: 43 DEGREES
PR INTERVAL - MUSE: 118 MS
QRS DURATION - MUSE: 74 MS
QT - MUSE: 390 MS
QTC - MUSE: 408 MS
R AXIS - MUSE: 28 DEGREES
SYSTOLIC BLOOD PRESSURE - MUSE: NORMAL MMHG
T AXIS - MUSE: 40 DEGREES
VENTRICULAR RATE- MUSE: 66 BPM

## 2024-10-08 DIAGNOSIS — F63.9 IMPULSE CONTROL DISORDER: ICD-10-CM

## 2024-10-08 DIAGNOSIS — F91.9 DISTURBANCE OF CONDUCT: ICD-10-CM

## 2024-10-08 DIAGNOSIS — F41.9 ANXIETY: ICD-10-CM

## 2024-10-09 NOTE — TELEPHONE ENCOUNTER
naltrexone 50 mg tablet       Last Written Prescription Date:  5/2/24  Last Fill Quantity: 30,   # refills: 5  Last Office Visit: 5/2/24  Future Office visit:       Routing refill request to provider for review/approval because:  Drug not on the [facility] or [facility] refill protocol or controlled substance    risperidone 0.5 mg tablet       Last Written Prescription Date:  6/11/24  Last Fill Quantity: 90,   # refills: 3  Last Office Visit: 5/2/24  Future Office visit:       Routing refill request to provider for review/approval because:  Antipsychotic Medications Dqsqmu21/08/2024 08:51 AM   Protocol Details   Lipid panel on file within the past 12 months    Medication indicated for associated diagnosis     Recent Labs   Lab Test 08/07/23  1006 06/01/21  1534   CHOL 135 134   HDL 57 42*   LDL 41 58   TRIG 186* 168*       
Home

## 2024-10-10 RX ORDER — RISPERIDONE 0.5 MG/1
0.5 TABLET ORAL 3 TIMES DAILY
Qty: 90 TABLET | Refills: 11 | Status: SHIPPED | OUTPATIENT
Start: 2024-10-10

## 2024-10-10 RX ORDER — NALTREXONE HYDROCHLORIDE 50 MG/1
50 TABLET, FILM COATED ORAL EVERY MORNING
Qty: 30 TABLET | Refills: 11 | Status: SHIPPED | OUTPATIENT
Start: 2024-10-10

## 2024-10-18 ENCOUNTER — APPOINTMENT (OUTPATIENT)
Dept: CT IMAGING | Facility: HOSPITAL | Age: 54
End: 2024-10-18
Attending: NURSE PRACTITIONER
Payer: MEDICARE

## 2024-10-18 ENCOUNTER — HOSPITAL ENCOUNTER (EMERGENCY)
Facility: HOSPITAL | Age: 54
Discharge: HOME | End: 2024-10-18
Attending: NURSE PRACTITIONER | Admitting: NURSE PRACTITIONER
Payer: MEDICARE

## 2024-10-18 VITALS
DIASTOLIC BLOOD PRESSURE: 74 MMHG | RESPIRATION RATE: 18 BRPM | TEMPERATURE: 98.3 F | HEART RATE: 72 BPM | SYSTOLIC BLOOD PRESSURE: 127 MMHG

## 2024-10-18 DIAGNOSIS — R45.1 AGITATION: ICD-10-CM

## 2024-10-18 DIAGNOSIS — S00.03XA CONTUSION OF SCALP, INITIAL ENCOUNTER: ICD-10-CM

## 2024-10-18 LAB
ALBUMIN UR-MCNC: NEGATIVE MG/DL
ANION GAP SERPL CALCULATED.3IONS-SCNC: 12 MMOL/L (ref 7–15)
APPEARANCE UR: CLEAR
BACTERIA #/AREA URNS HPF: ABNORMAL /HPF
BILIRUB UR QL STRIP: NEGATIVE
BUN SERPL-MCNC: 8.3 MG/DL (ref 6–20)
CALCIUM SERPL-MCNC: 8.5 MG/DL (ref 8.8–10.4)
CHLORIDE SERPL-SCNC: 103 MMOL/L (ref 98–107)
COLOR UR AUTO: ABNORMAL
CREAT SERPL-MCNC: 1.47 MG/DL (ref 0.51–0.95)
EGFRCR SERPLBLD CKD-EPI 2021: 42 ML/MIN/1.73M2
ERYTHROCYTE [DISTWIDTH] IN BLOOD BY AUTOMATED COUNT: 15 % (ref 10–15)
GLUCOSE SERPL-MCNC: 81 MG/DL (ref 70–99)
GLUCOSE UR STRIP-MCNC: NEGATIVE MG/DL
HCO3 SERPL-SCNC: 24 MMOL/L (ref 22–29)
HCT VFR BLD AUTO: 36.9 % (ref 35–47)
HGB BLD-MCNC: 12.2 G/DL (ref 11.7–15.7)
HGB UR QL STRIP: NEGATIVE
HOLD SPECIMEN: NORMAL
KETONES UR STRIP-MCNC: NEGATIVE MG/DL
LEUKOCYTE ESTERASE UR QL STRIP: NEGATIVE
MCH RBC QN AUTO: 37.7 PG (ref 26.5–33)
MCHC RBC AUTO-ENTMCNC: 33.1 G/DL (ref 31.5–36.5)
MCV RBC AUTO: 114 FL (ref 78–100)
NITRATE UR QL: NEGATIVE
PH UR STRIP: 5.5 [PH] (ref 4.7–8)
PLATELET # BLD AUTO: 206 10E3/UL (ref 150–450)
POTASSIUM SERPL-SCNC: 4 MMOL/L (ref 3.4–5.3)
RBC # BLD AUTO: 3.24 10E6/UL (ref 3.8–5.2)
RBC URINE: 1 /HPF
SODIUM SERPL-SCNC: 139 MMOL/L (ref 135–145)
SP GR UR STRIP: 1 (ref 1–1.03)
SQUAMOUS EPITHELIAL: 0 /HPF
UROBILINOGEN UR STRIP-MCNC: NORMAL MG/DL
WBC # BLD AUTO: 6.9 10E3/UL (ref 4–11)
WBC URINE: 1 /HPF

## 2024-10-18 PROCEDURE — G1010 CDSM STANSON: HCPCS

## 2024-10-18 PROCEDURE — 81003 URINALYSIS AUTO W/O SCOPE: CPT | Performed by: NURSE PRACTITIONER

## 2024-10-18 PROCEDURE — 36415 COLL VENOUS BLD VENIPUNCTURE: CPT | Performed by: NURSE PRACTITIONER

## 2024-10-18 PROCEDURE — 99284 EMERGENCY DEPT VISIT MOD MDM: CPT | Performed by: NURSE PRACTITIONER

## 2024-10-18 PROCEDURE — 85027 COMPLETE CBC AUTOMATED: CPT | Performed by: NURSE PRACTITIONER

## 2024-10-18 PROCEDURE — 99284 EMERGENCY DEPT VISIT MOD MDM: CPT | Mod: 25

## 2024-10-18 PROCEDURE — 80048 BASIC METABOLIC PNL TOTAL CA: CPT | Performed by: NURSE PRACTITIONER

## 2024-10-18 ASSESSMENT — COLUMBIA-SUICIDE SEVERITY RATING SCALE - C-SSRS
6. HAVE YOU EVER DONE ANYTHING, STARTED TO DO ANYTHING, OR PREPARED TO DO ANYTHING TO END YOUR LIFE?: NO
2. HAVE YOU ACTUALLY HAD ANY THOUGHTS OF KILLING YOURSELF IN THE PAST MONTH?: NO
1. IN THE PAST MONTH, HAVE YOU WISHED YOU WERE DEAD OR WISHED YOU COULD GO TO SLEEP AND NOT WAKE UP?: NO

## 2024-10-18 ASSESSMENT — ACTIVITIES OF DAILY LIVING (ADL)
ADLS_ACUITY_SCORE: 35
ADLS_ACUITY_SCORE: 35

## 2024-10-18 ASSESSMENT — ENCOUNTER SYMPTOMS
HEMATOLOGIC/LYMPHATIC NEGATIVE: 1
PSYCHIATRIC NEGATIVE: 1
RESPIRATORY NEGATIVE: 1
NEUROLOGICAL NEGATIVE: 1
CARDIOVASCULAR NEGATIVE: 1
EYES NEGATIVE: 1
CONSTITUTIONAL NEGATIVE: 1
ENDOCRINE NEGATIVE: 1
GASTROINTESTINAL NEGATIVE: 1
ALLERGIC/IMMUNOLOGIC NEGATIVE: 1

## 2024-10-19 NOTE — ED PROVIDER NOTES
History     Chief Complaint   Patient presents with    Mental Health Problem     HPI  Aaliyah Dean is a 54 year old individual with history of Down syndrome, hypothyroidism, impulse control disorder, anxiety, is sent in from her group home due to agitation.  Apparently patient got agitated at the group home and starting bashing her head against the wall.  Brought in by EMS for evaluation.  Patient states that she does have head pain.  No other acute complaints from the patient.    Allergies:  Allergies   Allergen Reactions    Brimonidine Other (See Comments)     Pain, swelling and erythema    Amoxicillin     Cephalexin Monohydrate      Keflex         Problem List:    Patient Active Problem List    Diagnosis Date Noted    Anxiety 06/20/2019     Priority: Medium    ACP (advance care planning) 06/12/2018     Priority: Medium    Allergic rhinitis 11/15/2010     Priority: Medium    Other acne 11/15/2010     Priority: Medium    Impulse control disorder 11/15/2010     Priority: Medium    Dysphagia 11/13/2007     Priority: Medium    Convulsions (H) 05/16/2007     Priority: Medium    Disturbance of conduct 01/05/2006     Priority: Medium    Down's syndrome 06/03/2002     Priority: Medium    Hyperlipidemia 05/09/2002     Priority: Medium    Hypothyroidism 03/21/2001     Priority: Medium        Past Medical History:    Past Medical History:   Diagnosis Date    Allergic rhinitis, cause unspecified 11/15/2010    Down's syndrome 06/03/2002    DYSPHAGIA NOS 11/13/2007    Impulse control disorder, unspecified 11/15/2010    Other acne 11/15/2010    Other and unspecified hyperlipidemia 05/09/2002    Other convulsions 05/16/2007    Unspecified acquired hypothyroidism 03/21/2001    Unspecified disturbance of conduct 01/05/2006       Past Surgical History:    Past Surgical History:   Procedure Laterality Date    CHOLECYSTECTOMY  01/2011    ENT SURGERY      EXAM UNDER ANESTHESIA PELVIC N/A 5/4/2016    Procedure: EXAM UNDER  ANESTHESIA PELVIC;  Surgeon: Valentin Ferris MD;  Location: [facility] OR    GENITOURINARY SURGERY      INTERNAL URETHROTOMY    HERNIA REPAIR  march 4 2015    ventral herniorrhaphy with intraperitoneal sepramesh    ileostomy take-down  2011    illeostomy  04/25/2011    ventilation tubes, bilateral         Family History:    Family History   Problem Relation Age of Onset    Cancer Mother         cause of death    Diabetes No family hx of        Social History:  Marital Status:  Single [1]  Social History     Tobacco Use    Smoking status: Never    Smokeless tobacco: Never    Tobacco comments:     no passive exposure   Vaping Use    Vaping status: Never Used   Substance Use Topics    Alcohol use: No    Drug use: No        Medications:    acetaminophen (TYLENOL) 500 MG tablet  calcium carbonate-vitamin D (CALCIUM-VITAMIN D3) 250-3.125 MG-MCG TABS per tablet  chlorhexidine (PERIDEX) 0.12 % solution  diphenhydrAMINE-acetaminophen (ACETAMINOPHEN PM)  MG tablet  FEROSUL 325 (65 Fe) MG tablet  fish oil-omega-3 fatty acids 1000 MG capsule  fluticasone (FLONASE) 50 MCG/ACT nasal spray  GAS RELIEF 125 MG CAPS  guanFACINE (TENEX) 1 MG tablet  ibuprofen (ADVIL,MOTRIN) 400 MG tablet  levonorgestrel-ethinyl estradiol (SEASONALE) 0.15-0.03 MG tablet  levothyroxine (SYNTHROID/LEVOTHROID) 137 MCG tablet  Lidocaine (GNP LIDOCAINE PAIN RELIEF) 4 % Patch  loratadine (CLARITIN) 10 MG tablet  naltrexone (DEPADE/REVIA) 50 MG tablet  omeprazole (PRILOSEC) 40 MG DR capsule  risperiDONE (RISPERDAL) 0.5 MG tablet  sertraline (ZOLOFT) 100 MG tablet  simvastatin (ZOCOR) 10 MG tablet  Skin Protectants, Misc. (EUCERIN) cream  triamcinolone (ARISTOCORT HP) 0.5 % external cream  triamcinolone (KENALOG) 0.1 % cream  vitamin C (ASCORBIC ACID) 500 MG tablet          Review of Systems   Constitutional: Negative.    HENT: Negative.     Eyes: Negative.    Respiratory: Negative.     Cardiovascular: Negative.    Gastrointestinal: Negative.    Endocrine:  Negative.    Genitourinary: Negative.    Musculoskeletal:         Head pain   Skin: Negative.    Allergic/Immunologic: Negative.    Neurological: Negative.    Hematological: Negative.    Psychiatric/Behavioral: Negative.         Physical Exam   BP: 127/74  Pulse: 72  Temp: 98.3  F (36.8  C)  Resp: 18      GENERAL APPEARANCE:  The patient is a 54 year old well-developed, well-nourished individual that appears as stated age.  HEENT:  Normocephalic.  No soft spots, indentations, noted upon palpation of the scalp or face.  Tenderness to palpation to anterior scalp.  No crepitus or deformities noted to face or scalp.  Pupils are equal, round, and reactive to light.  Oropharynx is clear.  No avulsed teeth, buccal or tongue lacerations present.  Voice is clear and without muffling.   No otorrhea or rhinorrhea present.  Negative de la garza sign.  Negative for hemotympanum bilaterally.  NECK:  Supple.  Trachea is midline.  No carotid bruits present on auscultation.  LUNGS:  Breathing is easy.  Breath sounds are equal and clear bilaterally.  No wheezes, rhonchi, or rales.  HEART:  Regular rate and rhythm with normal S1 and S2.  No murmurs, gallops, or rubs.  MUSCULOSKELETAL:  No cervical/thoracic/lumbar spinal tenderness, step-offs, deformities noted to palpation.  No paraspinal tenderness noted to palpation.  Cervical range of motion intact.  MENTAL STATUS:   The patient was alert and oriented to person. Registration and recall intact. No difficulty with concentration.  Spontaneous eye opening.  Follows commands.  GCS 15.   CRANIAL NERVES:  PERRL. EOMI; no nystagmus.  Full visual fields.  Trapezius and sternocleidomastoid are full strength. Tongue was midline and protrudes midline. Uvula was midline and raises midline. Facial sensation was intact to pain and light touch at all distributions. No speech disturbance. Hearing intact to conversation and whisper.  No facial asymmetry.  SENSORY:  Sensation intact.  PSYCHIATRIC:   Appropriate mood and affect.  Calm and cooperative with history and physical exam.  SKIN:  Warm, dry, and well perfused.  Good turgor.  No lesions, nodules, or rashes are noted.  No bruising noted.        ED Course     ED Course as of 10/18/24 2112   Fri Oct 18, 2024   1953 In to see patient and history/physical completed.    1957 Labs and head CT ordered.   2025 CT of head negative for acute abnormalities.   2057 Basic metabolic panel(!)  Electrolytes normal but does have worsening renal functions with a creatinine 1.47 and GFR 42 and compared to 2 weeks prior.               Results for orders placed or performed during the hospital encounter of 10/18/24 (from the past 24 hour(s))   CT Head w/o Contrast    Narrative    PROCEDURE: CT HEAD W/O CONTRAST   10/18/2024 8:09 PM    HISTORY:Female, age,  54 years, , , Injury    COMPARISON:No relevant prior imaging.    TECHNIQUE: CT of the brain without contrast. Axial; sagittal and  coronal reconstructed images were reviewed.  This facility minimizes radiation dose by adjusting the mA and/or kV  according to each patient size.  This CT scan was performed using one or more the following dose  reduction techniques:  -Automated exposure control,  -Adjustment of the mA and/or kV according to patient's size, and/or,  -Use of iterative reconstruction technique.      FINDINGS: Ventricles and sulci are normal in size and shape. Gray and  white matter demonstrate normal differentiation.    There is no evidence of mass, mass effect or midline shift. No  evidence of acute hemorrhage. No acute fracture.         Impression    IMPRESSION:   No acute intracranial abnormality.  No acute fracture.     Unremarkable examination.        NATHANIEL JORGENSEN MD         SYSTEM ID:  [id number]   CBC with platelets   Result Value Ref Range    WBC Count 6.9 4.0 - 11.0 10e3/uL    RBC Count 3.24 (L) 3.80 - 5.20 10e6/uL    Hemoglobin 12.2 11.7 - 15.7 g/dL    Hematocrit 36.9 35.0 - 47.0 %     (H) 78 -  100 fL    MCH 37.7 (H) 26.5 - 33.0 pg    MCHC 33.1 31.5 - 36.5 g/dL    RDW 15.0 10.0 - 15.0 %    Platelet Count 206 150 - 450 10e3/uL   Basic metabolic panel   Result Value Ref Range    Sodium 139 135 - 145 mmol/L    Potassium 4.0 3.4 - 5.3 mmol/L    Chloride 103 98 - 107 mmol/L    Carbon Dioxide (CO2) 24 22 - 29 mmol/L    Anion Gap 12 7 - 15 mmol/L    Urea Nitrogen 8.3 6.0 - 20.0 mg/dL    Creatinine 1.47 (H) 0.51 - 0.95 mg/dL    GFR Estimate 42 (L) >60 mL/min/1.73m2    Calcium 8.5 (L) 8.8 - 10.4 mg/dL    Glucose 81 70 - 99 mg/dL   Extra Tube    Narrative    The following orders were created for panel order Extra Tube.  Procedure                               Abnormality         Status                     ---------                               -----------         ------                     Extra Blue Top Tube[[id number]]                              In process                 Extra Red Top Tube[[id number]]                               In process                 Extra Heparinized Syringe[[id number]]                        In process                   Please view results for these tests on the individual orders.   UA with Microscopic reflex to Culture    Specimen: Urine, Midstream   Result Value Ref Range    Color Urine Straw Colorless, Straw, Light Yellow, Yellow    Appearance Urine Clear Clear    Glucose Urine Negative Negative mg/dL    Bilirubin Urine Negative Negative    Ketones Urine Negative Negative mg/dL    Specific Gravity Urine 1.004 1.003 - 1.035    Blood Urine Negative Negative    pH Urine 5.5 4.7 - 8.0    Protein Albumin Urine Negative Negative mg/dL    Urobilinogen Urine Normal Normal, 2.0 mg/dL    Nitrite Urine Negative Negative    Leukocyte Esterase Urine Negative Negative    Bacteria Urine Few (A) None Seen /HPF    RBC Urine 1 <=2 /HPF    WBC Urine 1 <=5 /HPF    Squamous Epithelials Urine 0 <=1 /HPF    Narrative    Urine Culture not indicated       Medications - No data to display    Assessments & Plan  (with Medical Decision Making)     I have reviewed the nursing notes.    I have reviewed the findings, diagnosis, plan and need for follow up with the patient.    Summary:  Patient presents to the ER today for agitation.  Potential diagnosis which have been considered and evaluated include UTI, electrolyte abnormality, psych, as well as others. Many of these have been excluded using the various modalities and assessment as noted on the chart. At the present time, the diagnosis given seems to be the most likely agitation and scalp contusion.  Upon arrival, vitals signs are normal.  The patient is alert and cooperative at this time.  Patient apparently got agitated with staff members or other residents and started banging her head on the wall.  Now complains of top of the head pain.  No other complaints from the patient.  Due to possibility of electrolyte abnormality or UTI did get lab work which showed WBC of 6.9 with hemoglobin of 12.2.  Electrolytes normal but does have a BUN of 8.3 with a creatinine 1.47 and GFR of 42 which has worsened since 2 weeks prior where he was 1.05 and a GFR of 64.  UA benign.  Head CT conducted showing no acute abnormalities.  At this time labs and imaging show no acute abnormalities.  Patient calm and cooperative.  Likely behavioral in nature as patient was upset.  Will discharge back to group home for follow-up with PCP.  Acetaminophen/ibuprofen for pain control.  Return if new or worsening symptoms.  Staff from group home verbalized understanding of plan of care.  Patient discharged home with staff.        Critical Care Time: None     Impression and plan discussed with patient. Questions answered, concerns addressed, indications for urgent re-evaluation reviewed, and  given. Patient/Parent/Caregiver agree with treatment plan and have no further questions at this time.  AVS provided at discharge.    This note was created by the Dragon Voice Dictation System. Inadvertent  typographical errors, due to software recognition problems, may still exist.             New Prescriptions    No medications on file       Final diagnoses:   Agitation   Contusion of scalp, initial encounter       10/18/2024   [facility]       Jonathan Duckworth, BRIGETTE CNP  10/18/24 2483

## 2024-10-19 NOTE — DISCHARGE INSTRUCTIONS
Pain control:   If your past medical conditions, allergies, current medications, or current status does not prevent you from using acetaminophen and/or ibuprofen, use the following:   Acetaminophen 650-1000 mg every 6 hours as needed for pain in addition to ibuprofen 400-600 mg every 6 hours as needed for pain.  Take these two medications together if wanted.    Remember that these are for AS NEEDED.  If not needed, do not take.            Follow-up with your primary care provider for reevaluation.  Contact your primary care provider if you have any questions or concerns.  Do not hesitate to return to the ER if any new or worsening symptoms.     Please read the attached instructions (if any).  They highlight more specific treatments and interventions for you at home.              Thank you for letting me participate in your care and wish you a fast and uneventful recovery,    Jonathan MACHUCA CNP    Do not hesitate to contact me with questions or concerns.  [email or web address]

## 2024-10-19 NOTE — ED TRIAGE NOTES
Pt comes from group home where she was said to have some behaviors. Pt was banging her head on wall. Pt then reports she stuck her fingers down her throat to throw up

## 2024-10-24 DIAGNOSIS — F91.9 DISTURBANCE OF CONDUCT: Primary | ICD-10-CM

## 2024-10-24 RX ORDER — HYDROXYZINE HYDROCHLORIDE 25 MG/1
25-50 TABLET, FILM COATED ORAL EVERY 6 HOURS PRN
Qty: 60 TABLET | Refills: 1 | Status: SHIPPED | OUTPATIENT
Start: 2024-10-24

## 2024-11-07 DIAGNOSIS — Z00.00 HEALTH CARE MAINTENANCE: ICD-10-CM

## 2024-11-07 RX ORDER — CHLORHEXIDINE GLUCONATE ORAL RINSE 1.2 MG/ML
SOLUTION DENTAL
Qty: 473 ML | Refills: 11 | Status: SHIPPED | OUTPATIENT
Start: 2024-11-07

## 2024-11-07 NOTE — TELEPHONE ENCOUNTER
Chlorhexadine (Peridex) 0.12% solution     Last Written Prescription Date:  09/09/2022  Last Fill Quantity: 473,   # refills: 11  Last Office Visit: 05/02/2024  Future Office visit:    Next 5 appointments (look out 90 days)      Nov 18, 2024 11:00 AM  (Arrive by 10:45 AM)  Adult Preventative Visit with Clarissa Santos MD  [facility] ([facility] ) [address]  [facility] [address]  [phone]

## 2024-11-07 NOTE — TELEPHONE ENCOUNTER
Routing refill request to provider for review/approval because:  Drug not on the [facility] [facility] or [facility] refill protocol or controlled substance

## 2024-11-11 DIAGNOSIS — F41.9 ANXIETY: ICD-10-CM

## 2024-11-11 NOTE — TELEPHONE ENCOUNTER
Zoloft      Last Written Prescription Date:  06/11/24  Last Fill Quantity: 30,   # refills: 4  Last Office Visit: 05/02/24  Future Office visit:    Next 5 appointments (look out 90 days)      Nov 18, 2024 11:00 AM  (Arrive by 10:45 AM)  Adult Preventative Visit with Clarissa Santos MD  [facility] ([facility] ) [address]  [place] [address]  [phone]

## 2024-11-12 RX ORDER — SERTRALINE HYDROCHLORIDE 100 MG/1
100 TABLET, FILM COATED ORAL EVERY MORNING
Qty: 30 TABLET | Refills: 5 | Status: SHIPPED | OUTPATIENT
Start: 2024-11-12

## 2024-11-12 NOTE — TELEPHONE ENCOUNTER
Routing refill request to provider for review/approval because:  SSRIs Protocol [id number]/11/2024 08:51 AM   Protocol Details TAYLER-7 score of less than 5 in past 6 months.           3/8/2022     9:00 AM 8/7/2023     9:31 AM 5/2/2024    10:08 AM   TAYLER-7 SCORE   Total Score  3 (minimal anxiety) 0 (minimal anxiety)   Total Score 3 3 0

## 2024-11-18 ENCOUNTER — OFFICE VISIT (OUTPATIENT)
Dept: FAMILY MEDICINE | Facility: OTHER | Age: 54
End: 2024-11-18
Attending: FAMILY MEDICINE
Payer: MEDICARE

## 2024-11-18 VITALS
SYSTOLIC BLOOD PRESSURE: 100 MMHG | RESPIRATION RATE: 16 BRPM | TEMPERATURE: 97.4 F | OXYGEN SATURATION: 96 % | WEIGHT: 101.7 LBS | HEART RATE: 81 BPM | BODY MASS INDEX: 22.88 KG/M2 | DIASTOLIC BLOOD PRESSURE: 68 MMHG | HEIGHT: 56 IN

## 2024-11-18 DIAGNOSIS — E03.9 HYPOTHYROIDISM, UNSPECIFIED TYPE: ICD-10-CM

## 2024-11-18 DIAGNOSIS — F63.9 IMPULSE CONTROL DISORDER: ICD-10-CM

## 2024-11-18 DIAGNOSIS — F91.9 DISTURBANCE OF CONDUCT: ICD-10-CM

## 2024-11-18 DIAGNOSIS — Q90.9 DOWN'S SYNDROME: ICD-10-CM

## 2024-11-18 DIAGNOSIS — Z12.31 VISIT FOR SCREENING MAMMOGRAM: ICD-10-CM

## 2024-11-18 DIAGNOSIS — Z00.00 ROUTINE GENERAL MEDICAL EXAMINATION AT A HEALTH CARE FACILITY: Primary | ICD-10-CM

## 2024-11-18 DIAGNOSIS — E78.5 HYPERLIPIDEMIA, UNSPECIFIED HYPERLIPIDEMIA TYPE: ICD-10-CM

## 2024-11-18 DIAGNOSIS — Z12.11 SCREEN FOR COLON CANCER: ICD-10-CM

## 2024-11-18 LAB
ALBUMIN SERPL BCG-MCNC: 3.5 G/DL (ref 3.5–5.2)
ALP SERPL-CCNC: 104 U/L (ref 40–150)
ALT SERPL W P-5'-P-CCNC: 13 U/L (ref 0–50)
ANION GAP SERPL CALCULATED.3IONS-SCNC: 11 MMOL/L (ref 7–15)
AST SERPL W P-5'-P-CCNC: 22 U/L (ref 0–45)
BILIRUB SERPL-MCNC: 0.3 MG/DL
BUN SERPL-MCNC: 12 MG/DL (ref 6–20)
CALCIUM SERPL-MCNC: 9.1 MG/DL (ref 8.8–10.4)
CHLORIDE SERPL-SCNC: 105 MMOL/L (ref 98–107)
CHOLEST SERPL-MCNC: 117 MG/DL
CREAT SERPL-MCNC: 1.07 MG/DL (ref 0.51–0.95)
EGFRCR SERPLBLD CKD-EPI 2021: 62 ML/MIN/1.73M2
ERYTHROCYTE [DISTWIDTH] IN BLOOD BY AUTOMATED COUNT: 15 % (ref 10–15)
FASTING STATUS PATIENT QL REPORTED: YES
FASTING STATUS PATIENT QL REPORTED: YES
GLUCOSE SERPL-MCNC: 88 MG/DL (ref 70–99)
HCO3 SERPL-SCNC: 25 MMOL/L (ref 22–29)
HCT VFR BLD AUTO: 38.8 % (ref 35–47)
HDLC SERPL-MCNC: 61 MG/DL
HGB BLD-MCNC: 13.1 G/DL (ref 11.7–15.7)
LDLC SERPL CALC-MCNC: 28 MG/DL
MCH RBC QN AUTO: 38.6 PG (ref 26.5–33)
MCHC RBC AUTO-ENTMCNC: 33.8 G/DL (ref 31.5–36.5)
MCV RBC AUTO: 115 FL (ref 78–100)
NONHDLC SERPL-MCNC: 56 MG/DL
PLATELET # BLD AUTO: 234 10E3/UL (ref 150–450)
POTASSIUM SERPL-SCNC: 4.4 MMOL/L (ref 3.4–5.3)
PROT SERPL-MCNC: 5.9 G/DL (ref 6.4–8.3)
RBC # BLD AUTO: 3.39 10E6/UL (ref 3.8–5.2)
SODIUM SERPL-SCNC: 141 MMOL/L (ref 135–145)
TRIGL SERPL-MCNC: 141 MG/DL
TSH SERPL DL<=0.005 MIU/L-ACNC: 0.94 UIU/ML (ref 0.3–4.2)
WBC # BLD AUTO: 4.8 10E3/UL (ref 4–11)

## 2024-11-18 PROCEDURE — 85018 HEMOGLOBIN: CPT | Mod: ZL | Performed by: FAMILY MEDICINE

## 2024-11-18 PROCEDURE — 84443 ASSAY THYROID STIM HORMONE: CPT | Mod: ZL | Performed by: FAMILY MEDICINE

## 2024-11-18 PROCEDURE — 85027 COMPLETE CBC AUTOMATED: CPT | Mod: ZL | Performed by: FAMILY MEDICINE

## 2024-11-18 PROCEDURE — G0463 HOSPITAL OUTPT CLINIC VISIT: HCPCS

## 2024-11-18 PROCEDURE — 85014 HEMATOCRIT: CPT | Mod: ZL | Performed by: FAMILY MEDICINE

## 2024-11-18 PROCEDURE — 80061 LIPID PANEL: CPT | Mod: ZL | Performed by: FAMILY MEDICINE

## 2024-11-18 PROCEDURE — 85048 AUTOMATED LEUKOCYTE COUNT: CPT | Mod: ZL | Performed by: FAMILY MEDICINE

## 2024-11-18 PROCEDURE — 36415 COLL VENOUS BLD VENIPUNCTURE: CPT | Mod: ZL | Performed by: FAMILY MEDICINE

## 2024-11-18 PROCEDURE — 85041 AUTOMATED RBC COUNT: CPT | Mod: ZL | Performed by: FAMILY MEDICINE

## 2024-11-18 PROCEDURE — 80053 COMPREHEN METABOLIC PANEL: CPT | Mod: ZL | Performed by: FAMILY MEDICINE

## 2024-11-18 SDOH — HEALTH STABILITY: PHYSICAL HEALTH: ON AVERAGE, HOW MANY DAYS PER WEEK DO YOU ENGAGE IN MODERATE TO STRENUOUS EXERCISE (LIKE A BRISK WALK)?: 0 DAYS

## 2024-11-18 ASSESSMENT — SOCIAL DETERMINANTS OF HEALTH (SDOH): HOW OFTEN DO YOU GET TOGETHER WITH FRIENDS OR RELATIVES?: ONCE A WEEK

## 2024-11-18 ASSESSMENT — PAIN SCALES - GENERAL: PAINLEVEL_OUTOF10: NO PAIN (0)

## 2024-11-18 NOTE — LETTER
November 19, 2024      Seble Dean  [address]     [facility] [address]        Dear ,    We are writing to inform you of your test results.    Kidney function improved, all other labs overall normal/stable     Resulted Orders   CBC with platelets   Result Value Ref Range    WBC Count 4.8 4.0 - 11.0 10e3/uL    RBC Count 3.39 (L) 3.80 - 5.20 10e6/uL    Hemoglobin 13.1 11.7 - 15.7 g/dL    Hematocrit 38.8 35.0 - 47.0 %     (H) 78 - 100 fL    MCH 38.6 (H) 26.5 - 33.0 pg    MCHC 33.8 31.5 - 36.5 g/dL    RDW 15.0 10.0 - 15.0 %    Platelet Count 234 150 - 450 10e3/uL   Lipid Profile (Chol, Trig, HDL, LDL calc)   Result Value Ref Range    Cholesterol 117 <200 mg/dL    Triglycerides 141 <150 mg/dL    Direct Measure HDL 61 >=50 mg/dL    LDL Cholesterol Calculated 28 <100 mg/dL    Non HDL Cholesterol 56 <130 mg/dL    Patient Fasting > 8hrs? Yes     Narrative    Cholesterol  Desirable: < 200 mg/dL  Borderline High: 200 - 239 mg/dL  High: >= 240 mg/dL    Triglycerides  Normal: < 150 mg/dL  Borderline High: 150 - 199 mg/dL  High: 200-499 mg/dL  Very High: >= 500 mg/dL    Direct Measure HDL  Female: >= 50 mg/dL   Male: >= 40 mg/dL    LDL Cholesterol  Desirable: < 100 mg/dL  Above Desirable: 100 - 129 mg/dL   Borderline High: 130 - 159 mg/dL   High:  160 - 189 mg/dL   Very High: >= 190 mg/dL    Non HDL Cholesterol  Desirable: < 130 mg/dL  Above Desirable: 130 - 159 mg/dL  Borderline High: 160 - 189 mg/dL  High: 190 - 219 mg/dL  Very High: >= 220 mg/dL   TSH with free T4 reflex   Result Value Ref Range    TSH 0.94 0.30 - 4.20 uIU/mL   Comprehensive metabolic panel   Result Value Ref Range    Sodium 141 135 - 145 mmol/L    Potassium 4.4 3.4 - 5.3 mmol/L    Carbon Dioxide (CO2) 25 22 - 29 mmol/L    Anion Gap 11 7 - 15 mmol/L    Urea Nitrogen 12.0 6.0 - 20.0 mg/dL    Creatinine 1.07 (H) 0.51 - 0.95 mg/dL    GFR Estimate 62 >60 mL/min/1.73m2      Comment:      eGFR calculated using 2021 CKD-EPI equation.     Calcium 9.1 8.8 - 10.4 mg/dL      Comment:      Reference intervals for this test were updated on 7/16/2024 to reflect our healthy population more accurately. There may be differences in the flagging of prior results with similar values performed with this method. Those prior results can be interpreted in the context of the updated reference intervals.    Chloride 105 98 - 107 mmol/L    Glucose 88 70 - 99 mg/dL    Alkaline Phosphatase 104 40 - 150 U/L    AST 22 0 - 45 U/L    ALT 13 0 - 50 U/L    Protein Total 5.9 (L) 6.4 - 8.3 g/dL    Albumin 3.5 3.5 - 5.2 g/dL    Bilirubin Total 0.3 <=1.2 mg/dL    Patient Fasting > 8hrs? Yes        If you have any questions or concerns, please call the clinic at the number listed above.       Sincerely,      Clarissa Santos MD

## 2024-11-18 NOTE — PROGRESS NOTES
Preventive Care Visit  [facility]  Clarissa St Sudeep MD, Family Medicine  Nov 18, 2024      Assessment & Plan       ICD-10-CM    1. Routine general medical examination at a health care facility  Z00.00       2. Down's syndrome  Q90.9       3. Disturbance of conduct  F91.9       4. Impulse control disorder  F63.9       5. Hyperlipidemia, unspecified hyperlipidemia type  E78.5 CBC with platelets     Lipid Profile (Chol, Trig, HDL, LDL calc)     Comprehensive metabolic panel     CBC with platelets     Lipid Profile (Chol, Trig, HDL, LDL calc)     Comprehensive metabolic panel      6. Hypothyroidism, unspecified type  E03.9 TSH with free T4 reflex     TSH with free T4 reflex      7. Visit for screening mammogram  Z12.31 MA Screening Bilateral w/ Robb      8. Screen for colon cancer  Z12.11 COLOGUARD([employer])         Chronic conditions are overall stable - labs are updated, will make medication adjustments as needed.  Medication review completed, stable overall  Mammogram and cologuard ordered    Patient has been advised of split billing requirements and indicates understanding: Yes        Counseling  Appropriate preventive services were addressed with this patient via screening, questionnaire, or discussion as appropriate for fall prevention, nutrition, physical activity, Tobacco-use cessation, social engagement, weight loss and cognition.  Checklist reviewing preventive services available has been given to the patient.  Reviewed patient's diet, addressing concerns and/or questions.   Updated plan of care.  Patient reported difficulty with activities of daily living were addressed today.Information on urinary incontinence and treatment options given to patient.       Return in about 1 year (around 11/18/2025) for Physical Exam.      Rebekah Pruett is a 54 year old, presenting for the following:  Physical            HPI    Hyperlipidemia Follow-Up    Are you regularly taking any medication or supplement to  lower your cholesterol?   Yes- fish oil and simvastatin  Are you having muscle aches or other side effects that you think could be caused by your cholesterol lowering medication?  No    Hypothyroidism Follow-up    Since last visit, patient describes the following symptoms: Weight stable, no hair loss, no skin changes, no constipation, no loose stools    Health Care Directive  Patient has a Health Care Directive on file  Advance care planning document is on file and is current.      11/18/2024   General Health   How would you rate your overall physical health? Good   Feel stress (tense, anxious, or unable to sleep) Only a little      (!) STRESS CONCERN      11/18/2024   Nutrition   Diet: Regular (no restrictions)            11/18/2024   Exercise   Days per week of moderate/strenous exercise 0 days      (!) EXERCISE CONCERN      11/18/2024   Social Factors   Frequency of gathering with friends or relatives Once a week   Worry food won't last until get money to buy more No   Food not last or not have enough money for food? No   Do you have housing? (Housing is defined as stable permanent housing and does not include staying ouside in a car, in a tent, in an abandoned building, in an overnight shelter, or couch-surfing.) Yes   Are you worried about losing your housing? No   Lack of transportation? No   Unable to get utilities (heat,electricity)? No            11/18/2024   Fall Risk   Fallen 2 or more times in the past year? No    Trouble with walking or balance? No        Patient-reported          11/18/2024   Activities of Daily Living- Home Safety   Needs help with the following daily activites Telephone use    Transportation    Shopping    Preparing meals    Housework    Bathing    Laundry    Medication administration    Money management    Dressing   Safety concerns in the home None of the above       Multiple values from one day are sorted in reverse-chronological order         11/18/2024   Dental   Dentist two  times every year? Yes            11/18/2024   Hearing Screening   Hearing concerns? None of the above            11/18/2024   Driving Risk Screening   Patient/family members have concerns about driving No            11/18/2024   General Alertness/Fatigue Screening   Have you been more tired than usual lately? No            11/18/2024   Urinary Incontinence Screening   Bothered by leaking urine in past 6 months Yes            11/18/2024   TB Screening   Were you born outside of the [place]? No                  11/18/2024   Substance Use   Alcohol more than 3/day or more than 7/wk Not Applicable   Do you have a current opioid prescription? No   How severe/bad is pain from 1 to 10? 0/10 (No Pain)   Do you use any other substances recreationally? No        Social History     Tobacco Use    Smoking status: Never    Smokeless tobacco: Never    Tobacco comments:     no passive exposure   Vaping Use    Vaping status: Never Used   Substance Use Topics    Alcohol use: No    Drug use: No          Mammogram Screening - Mammogram every 1-2 years updated in Health Maintenance based on mutual decision making          11/18/2024   One time HIV Screening   Previous HIV test? No        History of abnormal Pap smear: Unable to complete due to developmental disability        5/4/2016    12:00 AM   PAP / HPV   PAP (Historical) NIL      ASCVD Risk   The ASCVD Risk score (Melodie MCCALL, et al., 2019) failed to calculate for the following reasons:    The valid total cholesterol range is 130 to 320 mg/dL          Reviewed and updated as needed this visit by Provider   Tobacco  Allergies  Meds  Problems  Med Hx  Surg Hx  Fam Hx            Patient Active Problem List   Diagnosis    Down's syndrome    Hypothyroidism    Hyperlipidemia    Disturbance of conduct    Convulsions (H)    Dysphagia    Allergic rhinitis    Other acne    Impulse control disorder    ACP (advance care planning)    Anxiety     Past Surgical History:   Procedure  Laterality Date    CHOLECYSTECTOMY  01/2011    ENT SURGERY      EXAM UNDER ANESTHESIA PELVIC N/A 5/4/2016    Procedure: EXAM UNDER ANESTHESIA PELVIC;  Surgeon: Valentin Ferris MD;  Location: [facility]    GENITOURINARY SURGERY      INTERNAL URETHROTOMY    HERNIA REPAIR  march 4 2015    ventral herniorrhaphy with intraperitoneal sepramesh    ileostomy take-down  2011    illeostomy  04/25/2011    ventilation tubes, bilateral         Social History     Tobacco Use    Smoking status: Never    Smokeless tobacco: Never    Tobacco comments:     no passive exposure   Substance Use Topics    Alcohol use: No     Family History   Problem Relation Age of Onset    Cancer Mother         cause of death    Diabetes No family hx of          Current Outpatient Medications   Medication Sig Dispense Refill    calcium carbonate-vitamin D (CALCIUM-VITAMIN D3) 250-3.125 MG-MCG TABS per tablet TAKE ONE (1) TABLET BY MOUTH TWICE DAILY WITH MEALS 180 tablet 2    chlorhexidine (PERIDEX) 0.12 % solution SWAB ON TEETH AND GUMS FOR 30 SECONDS TWICE A  mL 11    diphenhydrAMINE-acetaminophen (ACETAMINOPHEN PM)  MG tablet Take 2 tablets by mouth At Bedtime 60 tablet 5    FEROSUL 325 (65 Fe) MG tablet Take 1 tablet (325 mg) by mouth 2 times daily 180 tablet 3    fish oil-omega-3 fatty acids 1000 MG capsule Take 1,000 mg by mouth 3 times daily 100 capsule 11    fluticasone (FLONASE) 50 MCG/ACT nasal spray INSTILL 2 SPRAYS INTO BOTH NOSTRILS DAILY SHAKE GENTLY 16 g 5    GAS RELIEF 125 MG CAPS Take 1-2 capsules by mouth 2 times daily as needed 30 capsule 3    guanFACINE (TENEX) 1 MG tablet Take 1 tablet (1 mg) by mouth 2 times daily 60 tablet 5    hydrOXYzine HCl (ATARAX) 25 MG tablet Take 1-2 tablets (25-50 mg) by mouth every 6 hours as needed for anxiety. 60 tablet 1    ibuprofen (ADVIL,MOTRIN) 400 MG tablet Take 1 tablet by mouth every 4-6 hours as needed 120 tablet 2    levonorgestrel-ethinyl estradiol (SEASONALE) 0.15-0.03 MG tablet TAKE  ONE (1) TABLET BY MOUTH DAILY QUASENSE/JOLESSA 91 tablet 3    levothyroxine (SYNTHROID/LEVOTHROID) 137 MCG tablet Take 1 tablet (137 mcg) by mouth daily 30 tablet 11    Lidocaine (GNP LIDOCAINE PAIN RELIEF) 4 % Patch Place onto the skin daily as needed for moderate pain To prevent lidocaine toxicity, patient should be patch free for 12 hrs daily. 30 patch 5    loratadine (CLARITIN) 10 MG tablet Take 1 tablet (10 mg) by mouth daily 30 tablet 10    naltrexone (DEPADE/REVIA) 50 MG tablet Take 1 tablet (50 mg) by mouth every morning 30 tablet 11    omeprazole (PRILOSEC) 40 MG DR capsule Take 1 capsule (40 mg) by mouth daily 30 capsule 11    risperiDONE (RISPERDAL) 0.5 MG tablet Take 1 tablet by mouth three times a day 90 tablet 11    sertraline (ZOLOFT) 100 MG tablet Take 1 tablet by mouth in the morning 30 tablet 5    simvastatin (ZOCOR) 10 MG tablet TAKE ONE (1) TABLET BY MOUTH DAILY 30 tablet 10    Skin Protectants, Misc. (EUCERIN) cream       triamcinolone (ARISTOCORT HP) 0.5 % external cream Apply topically 2 times daily as needed (eczema on left hand) , may use for three weeks then stop for one week then resume 15 g 2    triamcinolone (KENALOG) 0.1 % cream Apply sparingly to affected area twice daily as needed 80 g 1    vitamin C (ASCORBIC ACID) 500 MG tablet TAKE ONE (1) TABLET BY MOUTH DAILY 100 tablet 0    acetaminophen (TYLENOL) 500 MG tablet Take 2 tablets (1,000 mg) by mouth every morning. May also take 2 tablets (1,000 mg) daily as needed for mild pain. 100 tablet 2     Allergies   Allergen Reactions    Brimonidine Other (See Comments)     Pain, swelling and erythema    Amoxicillin     Cephalexin Monohydrate      Keflex       Current providers sharing in care for this patient include:  Patient Care Team:  Clarissa Santos MD as PCP - General (Family Practice)  Clarissa Santos MD as Assigned PCP    The following health maintenance items are reviewed in Epic and correct as of today:  Health  "Maintenance   Topic Date Due    MAMMO SCREENING  Never done    COLORECTAL CANCER SCREENING  Never done    HIV SCREENING  Never done    HEPATITIS C SCREENING  Never done    HEPATITIS B IMMUNIZATION (1 of 3 - 19+ 3-dose series) Never done    PAP  05/04/2019    ZOSTER IMMUNIZATION (1 of 2) Never done    DTAP/TDAP/TD IMMUNIZATION (2 - Td or Tdap) 01/13/2023    MEDICARE ANNUAL WELLNESS VISIT  08/07/2024    LIPID  11/18/2025    TSH W/FREE T4 REFLEX  11/18/2025    ADVANCE CARE PLANNING  01/25/2027    GLUCOSE  11/18/2027    RSV VACCINE (1 - 1-dose 75+ series) 08/18/2047    PHQ-2 (once per calendar year)  Completed    INFLUENZA VACCINE  Completed    COVID-19 Vaccine  Completed    Pneumococcal Vaccine: Pediatrics (0 to 5 Years) and At-Risk Patients (6 to 64 Years)  Aged Out    HPV IMMUNIZATION  Aged Out    MENINGITIS IMMUNIZATION  Aged Out    RSV MONOCLONAL ANTIBODY  Aged Out         Review of Systems  Constitutional, HEENT, cardiovascular, pulmonary, gi and gu systems are negative, except as otherwise noted.     Objective    Exam  /68 (BP Location: Right arm, Patient Position: Sitting, Cuff Size: Adult Regular)   Pulse 81   Temp 97.4  F (36.3  C) (Tympanic)   Resp 16   Ht 1.422 m (4' 8\")   Wt 46.1 kg (101 lb 11.2 oz)   SpO2 96%   BMI 22.80 kg/m     Estimated body mass index is 22.8 kg/m  as calculated from the following:    Height as of this encounter: 1.422 m (4' 8\").    Weight as of this encounter: 46.1 kg (101 lb 11.2 oz).    Physical Exam  GENERAL: alert and no distress  EYES: Eyes grossly normal to inspection, PERRL and conjunctivae and sclerae normal  HENT: ear canals and TM's normal, nose and mouth without ulcers or lesions  NECK: no adenopathy, no asymmetry, masses, or scars  RESP: lungs clear to auscultation - no rales, rhonchi or wheezes  CV: regular rates and rhythm, normal S1 S2, no S3 or S4, and no murmur, click or rub  ABDOMEN: soft, nontender, no hepatosplenomegaly, no masses and bowel sounds " normal  MS: no gross musculoskeletal defects noted, no edema  SKIN: no suspicious lesions or rashes  NEURO: Normal strength and tone, mentation intact and speech normal  PSYCH: mentation appears normal, affect normal/bright          11/18/2024   Mini Cog   Mini-Cog Not Completed (choose reason) Mental handicap                 Signed Electronically by: Clarissa Santos MD

## 2024-11-20 DIAGNOSIS — M19.011 ARTHRITIS OF RIGHT SHOULDER REGION: ICD-10-CM

## 2024-11-20 NOTE — TELEPHONE ENCOUNTER
acetaminophen 500 mg tablet       Last Written Prescription Date:  8/22/24  Last Fill Quantity: 100,   # refills: 2  Last Office Visit: 11/18/24  Future Office visit:       Routing refill request to provider for review/approval because:  Analgesics (Non-Narcotic Tylenol and ASA Only) [employer]7211/20/2024 11:44 AM   Protocol Details   Medication matches indication

## 2024-11-21 RX ORDER — PSEUDOEPHED/ACETAMINOPH/DIPHEN 30MG-500MG
TABLET ORAL
Qty: 100 TABLET | Refills: 2 | Status: SHIPPED | OUTPATIENT
Start: 2024-11-21

## 2024-12-30 ENCOUNTER — TELEPHONE (OUTPATIENT)
Dept: FAMILY MEDICINE | Facility: OTHER | Age: 54
End: 2024-12-30

## 2024-12-30 NOTE — TELEPHONE ENCOUNTER
Pt called and left a message.  Pt was seen in ER for pain and was started B12 tablets but needs to be retested and see if pt needs to start on the injection.      Pt scheduled with a covering provider.

## 2024-12-30 NOTE — TELEPHONE ENCOUNTER
Symptom or reason needing to speak to RN: ER Follow up/ 12/21/24-12/25/24 / [facility] / Septic, B12 not absorbing by pill, may need injection instead    Best number to return call: [phone]      Best time to return call: any

## 2025-01-06 ENCOUNTER — MEDICAL CORRESPONDENCE (OUTPATIENT)
Dept: HEALTH INFORMATION MANAGEMENT | Facility: CLINIC | Age: 55
End: 2025-01-06

## 2025-01-06 ENCOUNTER — OFFICE VISIT (OUTPATIENT)
Dept: FAMILY MEDICINE | Facility: OTHER | Age: 55
End: 2025-01-06
Attending: NURSE PRACTITIONER
Payer: MEDICARE

## 2025-01-06 VITALS
OXYGEN SATURATION: 97 % | SYSTOLIC BLOOD PRESSURE: 90 MMHG | WEIGHT: 101 LBS | DIASTOLIC BLOOD PRESSURE: 61 MMHG | HEART RATE: 96 BPM | RESPIRATION RATE: 18 BRPM | BODY MASS INDEX: 22.64 KG/M2 | TEMPERATURE: 97 F

## 2025-01-06 DIAGNOSIS — Z09 HOSPITAL DISCHARGE FOLLOW-UP: Primary | ICD-10-CM

## 2025-01-06 DIAGNOSIS — D53.9 MACROCYTIC ANEMIA: ICD-10-CM

## 2025-01-06 DIAGNOSIS — R65.21 SEVERE SEPSIS WITH SEPTIC SHOCK (H): ICD-10-CM

## 2025-01-06 DIAGNOSIS — F50.89 SELF INDUCED VOMITING: ICD-10-CM

## 2025-01-06 DIAGNOSIS — D51.9 ANEMIA DUE TO VITAMIN B12 DEFICIENCY, UNSPECIFIED B12 DEFICIENCY TYPE: ICD-10-CM

## 2025-01-06 DIAGNOSIS — K76.9 LIVER LESION, RIGHT LOBE: ICD-10-CM

## 2025-01-06 DIAGNOSIS — D69.6 THROMBOCYTOPENIA: ICD-10-CM

## 2025-01-06 DIAGNOSIS — J69.0 ASPIRATION PNEUMONIA, UNSPECIFIED ASPIRATION PNEUMONIA TYPE, UNSPECIFIED LATERALITY, UNSPECIFIED PART OF LUNG (H): ICD-10-CM

## 2025-01-06 DIAGNOSIS — A41.9 SEVERE SEPSIS WITH SEPTIC SHOCK (H): ICD-10-CM

## 2025-01-06 PROBLEM — N17.9 AKI (ACUTE KIDNEY INJURY): Status: RESOLVED | Noted: 2024-12-21 | Resolved: 2025-01-06

## 2025-01-06 PROBLEM — N30.01 ACUTE CYSTITIS WITH HEMATURIA: Status: ACTIVE | Noted: 2024-12-21

## 2025-01-06 PROBLEM — N30.01 ACUTE CYSTITIS WITH HEMATURIA: Status: RESOLVED | Noted: 2024-12-21 | Resolved: 2025-01-06

## 2025-01-06 PROBLEM — E83.42 HYPOMAGNESEMIA: Status: ACTIVE | Noted: 2024-12-23

## 2025-01-06 PROBLEM — R45.1 RESTLESSNESS AND AGITATION: Status: ACTIVE | Noted: 2024-12-22

## 2025-01-06 PROBLEM — N17.9 AKI (ACUTE KIDNEY INJURY): Status: ACTIVE | Noted: 2024-12-21

## 2025-01-06 PROBLEM — D72.819 LEUKOPENIA: Status: ACTIVE | Noted: 2024-12-21

## 2025-01-06 LAB
ERYTHROCYTE [DISTWIDTH] IN BLOOD BY AUTOMATED COUNT: 14.5 % (ref 10–15)
HCT VFR BLD AUTO: 35 % (ref 35–47)
HGB BLD-MCNC: 11.4 G/DL (ref 11.7–15.7)
MCH RBC QN AUTO: 37.7 PG (ref 26.5–33)
MCHC RBC AUTO-ENTMCNC: 32.6 G/DL (ref 31.5–36.5)
MCV RBC AUTO: 116 FL (ref 78–100)
PLATELET # BLD AUTO: 226 10E3/UL (ref 150–450)
RBC # BLD AUTO: 3.02 10E6/UL (ref 3.8–5.2)
WBC # BLD AUTO: 7.9 10E3/UL (ref 4–11)

## 2025-01-06 PROCEDURE — 36415 COLL VENOUS BLD VENIPUNCTURE: CPT | Mod: ZL | Performed by: NURSE PRACTITIONER

## 2025-01-06 PROCEDURE — 86340 INTRINSIC FACTOR ANTIBODY: CPT | Mod: ZL | Performed by: NURSE PRACTITIONER

## 2025-01-06 PROCEDURE — G0463 HOSPITAL OUTPT CLINIC VISIT: HCPCS

## 2025-01-06 PROCEDURE — 99495 TRANSJ CARE MGMT MOD F2F 14D: CPT | Performed by: NURSE PRACTITIONER

## 2025-01-06 PROCEDURE — 85027 COMPLETE CBC AUTOMATED: CPT | Mod: ZL | Performed by: NURSE PRACTITIONER

## 2025-01-06 PROCEDURE — 82607 VITAMIN B-12: CPT | Mod: ZL | Performed by: NURSE PRACTITIONER

## 2025-01-06 ASSESSMENT — PAIN SCALES - GENERAL: PAINLEVEL_OUTOF10: NO PAIN (0)

## 2025-01-06 NOTE — PROGRESS NOTES
Assessment & Plan     Hospital discharge follow-up  Improved. Follow up with Seble's primary provider, Dr. Wall to discuss any further medication needs. It appears Seble is feeling much better, which is great to see. Continue offering distraction and enrichment activities to help with behaviors and self-gagging.         Severe sepsis with septic shock (H)  Resolved.     Anemia due to vitamin B12 deficiency, unspecified B12 deficiency type  Recheck B12 and intrinsic factor, as recommended in discharge plan. If significantly low, may be a candidate for monthly injections since Seble is not tolerating the SL tabs well.   - Vitamin B12; Future  - Intrinsic Factor Blocking Antibody; Future  - CBC with platelets; Future  - Vitamin B12  - Intrinsic Factor Blocking Antibody  - CBC with platelets    Aspiration pneumonia, unspecified aspiration pneumonia type, unspecified laterality, unspecified part of lung (H)  Resolved. Continues gagging self and inducing vomiting, more at night.     Self induced vomiting  Follow up with primary provider.     Liver lesion, right lobe  Follow up with primary provider    Macrocytic anemia  Labs rechecked today. Clinic will call with any abnormal results.     Thrombocytopenia (H)  Recheck today. No bleeding reported.         MED REC REQUIRED{  Post Medication Reconciliation Status: discharge medications reconciled, continue medications without change        No follow-ups on file.    Subjective   Seble is a 54 year old, presenting for the following health issues:  ER F/U        1/6/2025     3:21 PM   Additional Questions   Roomed by Margie MILLER   Accompanied by Staff jennifer     History of Present Illness       Reason for visit:  Follow up    She eats 2-3 servings of fruits and vegetables daily.She consumes 1 sweetened beverage(s) daily.She exercises with enough effort to increase her heart rate 9 or less minutes per day.  She exercises with enough effort to increase her heart rate 3 or less  days per week.   She is taking medications regularly.     Also check B-12 level        Hospital Follow-up Visit:    Hospital/Nursing Home/IP Rehab Facility:  [facility]  Date of Admission: 12/21/24  Date of Discharge: 12/25/24  Reason(s) for Admission: Severe Sepsis presumed aspiration pneumonia  Was the patient in the ICU or did the patient experience delirium during hospitalization?  yes  Do you have any other stressors you would like to discuss with your provider? No    Problems taking medications regularly:  None  Medication changes since discharge: None  Problems adhering to non-medication therapy:  None    Summary of hospitalization:  CareEverywhere information obtained and reviewed  Diagnostic Tests/Treatments reviewed.  Follow up needed: B12 and intrinsic factor antibiodies.  Other Healthcare Providers Involved in Patient s Care:          PCP- Dr. Wall. Lives in a group home setting.   Update since discharge: improved.   Staff denies fevers, atypical behavior, cough, diarrhea concerns. Continues to gag self to point of vomiting. Continues to vomiting up some of their pills, more at night than during daytime. Taking the B12 but not tolerating is as a sublingual mediation. Usually just swallows it or spits out.  Staff is wondering about possible change to medication to help with self-gagging behaviors. PCP is Dr. Wall who manages all medications.         Plan of care communicated with patient and staff. Chart Cc'd to primary provider           Objective    BP 90/61 (BP Location: Left arm, Patient Position: Sitting, Cuff Size: Adult Large)   Pulse 96   Temp 97  F (36.1  C) (Tympanic)   Resp 18   Wt 45.8 kg (101 lb)   LMP  (LMP Unknown)   SpO2 97%   Breastfeeding No   BMI 22.64 kg/m    Body mass index is 22.64 kg/m .  Physical Exam               Signed Electronically by: Dionne Wu, CNP

## 2025-01-07 DIAGNOSIS — F41.9 ANXIETY: ICD-10-CM

## 2025-01-07 DIAGNOSIS — Z00.00 HEALTHCARE MAINTENANCE: ICD-10-CM

## 2025-01-07 DIAGNOSIS — M19.011 ARTHRITIS OF RIGHT SHOULDER REGION: ICD-10-CM

## 2025-01-08 LAB — VIT B12 SERPL-MCNC: 1149 PG/ML (ref 232–1245)

## 2025-01-08 RX ORDER — CHLORAL HYDRATE 500 MG
1000 CAPSULE ORAL 3 TIMES DAILY
Qty: 100 CAPSULE | Refills: 11 | Status: SHIPPED | OUTPATIENT
Start: 2025-01-08

## 2025-01-08 RX ORDER — ACETAMINOPHEN, DIPHENHYDRAMINE HYDROCHLORIDE 500; 25 MG/1; MG/1
TABLET, FILM COATED ORAL
Qty: 60 TABLET | Refills: 5 | Status: SHIPPED | OUTPATIENT
Start: 2025-01-08

## 2025-01-08 NOTE — TELEPHONE ENCOUNTER
Acetaminophen PM 25 mg-500 mg tablet         Last Written Prescription Date:  11/21/24  Last Fill Quantity: 100,   # refills: 2  Last Office Visit: 1/6/25  Future Office visit:    Next 5 appointments (look out 90 days)      Jan 23, 2025 11:30 AM  (Arrive by 11:15 AM)  Provider Visit with Clarissa Santos MD  [facility] ([facility] ) [address]  [place] [address]  [phone]             Routing refill request to provider for review/approval because:  Drug not on the [facility], [facility] or [facility] refill protocol or controlled substance

## 2025-01-09 LAB — IF BLOCK AB SER QL RIA: NEGATIVE

## 2025-01-21 ENCOUNTER — HOSPITAL ENCOUNTER (EMERGENCY)
Facility: HOSPITAL | Age: 55
Discharge: HOME | End: 2025-01-21
Attending: STUDENT IN AN ORGANIZED HEALTH CARE EDUCATION/TRAINING PROGRAM
Payer: MEDICARE

## 2025-01-21 ENCOUNTER — APPOINTMENT (OUTPATIENT)
Dept: CT IMAGING | Facility: HOSPITAL | Age: 55
End: 2025-01-21
Attending: STUDENT IN AN ORGANIZED HEALTH CARE EDUCATION/TRAINING PROGRAM
Payer: MEDICARE

## 2025-01-21 VITALS
TEMPERATURE: 97.6 F | RESPIRATION RATE: 16 BRPM | SYSTOLIC BLOOD PRESSURE: 126 MMHG | HEART RATE: 98 BPM | OXYGEN SATURATION: 97 % | DIASTOLIC BLOOD PRESSURE: 76 MMHG

## 2025-01-21 DIAGNOSIS — K52.9 GASTROENTERITIS: ICD-10-CM

## 2025-01-21 LAB
ALBUMIN SERPL BCG-MCNC: 3.8 G/DL (ref 3.5–5.2)
ALBUMIN UR-MCNC: NEGATIVE MG/DL
ALP SERPL-CCNC: 130 U/L (ref 40–150)
ALT SERPL W P-5'-P-CCNC: 14 U/L (ref 0–50)
ANION GAP SERPL CALCULATED.3IONS-SCNC: 11 MMOL/L (ref 7–15)
APPEARANCE UR: CLEAR
AST SERPL W P-5'-P-CCNC: 22 U/L (ref 0–45)
BILIRUB SERPL-MCNC: 0.4 MG/DL
BILIRUB UR QL STRIP: NEGATIVE
BUN SERPL-MCNC: 14 MG/DL (ref 6–20)
CALCIUM SERPL-MCNC: 9.1 MG/DL (ref 8.8–10.4)
CHLORIDE SERPL-SCNC: 102 MMOL/L (ref 98–107)
COLOR UR AUTO: YELLOW
CREAT SERPL-MCNC: 1.03 MG/DL (ref 0.51–0.95)
EGFRCR SERPLBLD CKD-EPI 2021: 65 ML/MIN/1.73M2
ERYTHROCYTE [DISTWIDTH] IN BLOOD BY AUTOMATED COUNT: 13.6 % (ref 10–15)
GLUCOSE SERPL-MCNC: 113 MG/DL (ref 70–99)
GLUCOSE UR STRIP-MCNC: NEGATIVE MG/DL
HCO3 SERPL-SCNC: 27 MMOL/L (ref 22–29)
HCT VFR BLD AUTO: 40.5 % (ref 35–47)
HGB BLD-MCNC: 13.4 G/DL (ref 11.7–15.7)
HGB UR QL STRIP: NEGATIVE
HOLD SPECIMEN: NORMAL
KETONES UR STRIP-MCNC: NEGATIVE MG/DL
LEUKOCYTE ESTERASE UR QL STRIP: NEGATIVE
LIPASE SERPL-CCNC: 16 U/L (ref 13–60)
MCH RBC QN AUTO: 37.2 PG (ref 26.5–33)
MCHC RBC AUTO-ENTMCNC: 33.1 G/DL (ref 31.5–36.5)
MCV RBC AUTO: 113 FL (ref 78–100)
MUCOUS THREADS #/AREA URNS LPF: PRESENT /LPF
NITRATE UR QL: NEGATIVE
PH UR STRIP: 5.5 [PH] (ref 4.7–8)
PLATELET # BLD AUTO: 226 10E3/UL (ref 150–450)
POTASSIUM SERPL-SCNC: 4.6 MMOL/L (ref 3.4–5.3)
PROT SERPL-MCNC: 7 G/DL (ref 6.4–8.3)
RBC # BLD AUTO: 3.6 10E6/UL (ref 3.8–5.2)
RBC URINE: 1 /HPF
SODIUM SERPL-SCNC: 140 MMOL/L (ref 135–145)
SP GR UR STRIP: 1.05 (ref 1–1.03)
SQUAMOUS EPITHELIAL: 2 /HPF
UROBILINOGEN UR STRIP-MCNC: NORMAL MG/DL
WBC # BLD AUTO: 11.7 10E3/UL (ref 4–11)
WBC URINE: 1 /HPF

## 2025-01-21 PROCEDURE — 250N000013 HC RX MED GY IP 250 OP 250 PS 637: Performed by: STUDENT IN AN ORGANIZED HEALTH CARE EDUCATION/TRAINING PROGRAM

## 2025-01-21 PROCEDURE — 36415 COLL VENOUS BLD VENIPUNCTURE: CPT | Performed by: STUDENT IN AN ORGANIZED HEALTH CARE EDUCATION/TRAINING PROGRAM

## 2025-01-21 PROCEDURE — 85048 AUTOMATED LEUKOCYTE COUNT: CPT | Performed by: STUDENT IN AN ORGANIZED HEALTH CARE EDUCATION/TRAINING PROGRAM

## 2025-01-21 PROCEDURE — 81001 URINALYSIS AUTO W/SCOPE: CPT | Performed by: STUDENT IN AN ORGANIZED HEALTH CARE EDUCATION/TRAINING PROGRAM

## 2025-01-21 PROCEDURE — 99284 EMERGENCY DEPT VISIT MOD MDM: CPT | Performed by: STUDENT IN AN ORGANIZED HEALTH CARE EDUCATION/TRAINING PROGRAM

## 2025-01-21 PROCEDURE — 99285 EMERGENCY DEPT VISIT HI MDM: CPT | Mod: 25

## 2025-01-21 PROCEDURE — 85018 HEMOGLOBIN: CPT | Performed by: STUDENT IN AN ORGANIZED HEALTH CARE EDUCATION/TRAINING PROGRAM

## 2025-01-21 PROCEDURE — 250N000011 HC RX IP 250 OP 636: Performed by: RADIOLOGY

## 2025-01-21 PROCEDURE — 83690 ASSAY OF LIPASE: CPT | Performed by: STUDENT IN AN ORGANIZED HEALTH CARE EDUCATION/TRAINING PROGRAM

## 2025-01-21 PROCEDURE — 74177 CT ABD & PELVIS W/CONTRAST: CPT

## 2025-01-21 PROCEDURE — 82040 ASSAY OF SERUM ALBUMIN: CPT | Performed by: STUDENT IN AN ORGANIZED HEALTH CARE EDUCATION/TRAINING PROGRAM

## 2025-01-21 RX ORDER — ONDANSETRON 2 MG/ML
4 INJECTION INTRAMUSCULAR; INTRAVENOUS EVERY 30 MIN PRN
Status: DISCONTINUED | OUTPATIENT
Start: 2025-01-21 | End: 2025-01-21 | Stop reason: HOSPADM

## 2025-01-21 RX ORDER — MAGNESIUM HYDROXIDE/ALUMINUM HYDROXICE/SIMETHICONE 120; 1200; 1200 MG/30ML; MG/30ML; MG/30ML
15 SUSPENSION ORAL ONCE
Status: COMPLETED | OUTPATIENT
Start: 2025-01-21 | End: 2025-01-21

## 2025-01-21 RX ORDER — HYDROMORPHONE HYDROCHLORIDE 1 MG/ML
0.5 INJECTION, SOLUTION INTRAMUSCULAR; INTRAVENOUS; SUBCUTANEOUS EVERY 30 MIN PRN
Status: DISCONTINUED | OUTPATIENT
Start: 2025-01-21 | End: 2025-01-21 | Stop reason: HOSPADM

## 2025-01-21 RX ORDER — ONDANSETRON 4 MG/1
4 TABLET, ORALLY DISINTEGRATING ORAL EVERY 8 HOURS PRN
Qty: 15 TABLET | Refills: 0 | Status: SHIPPED | OUTPATIENT
Start: 2025-01-21

## 2025-01-21 RX ORDER — IOPAMIDOL 755 MG/ML
50 INJECTION, SOLUTION INTRAVASCULAR ONCE
Status: COMPLETED | OUTPATIENT
Start: 2025-01-21 | End: 2025-01-21

## 2025-01-21 RX ADMIN — ALUMINUM HYDROXIDE, MAGNESIUM HYDROXIDE, AND SIMETHICONE 15 ML: 1200; 120; 1200 SUSPENSION ORAL at 11:20

## 2025-01-21 RX ADMIN — IOPAMIDOL 50 ML: 755 INJECTION, SOLUTION INTRAVENOUS at 10:15

## 2025-01-21 ASSESSMENT — ACTIVITIES OF DAILY LIVING (ADL)
ADLS_ACUITY_SCORE: 41

## 2025-01-21 NOTE — PROGRESS NOTES
"  Assessment & Plan     1. Acute gastroenteritis (Primary)  Acute illness seems to have improved, monitor symptoms    2. Self induced vomiting  With patient's developmental disabilities, I would wonder if she is experiencing worsening acid reflux, and she is gagging herself in order to relieve the acid.  Will refer to GI to see if endoscopy would be recommended, or if change in therapy is warranted.  - Adult GI  Referral - Consult Only; Future    3. Dysphagia, unspecified type  As above.  - Adult GI  Referral - Consult Only; Future       The longitudinal plan of care for the diagnosis(es)/condition(s) as documented were addressed during this visit. Due to the added complexity in care, I will continue to support Seble in the subsequent management and with ongoing continuity of care.     MED REC REQUIRED  Post Medication Reconciliation Status: patient was not discharged from an inpatient facility or TCU      Return if symptoms worsen or fail to improve.      Subjective   Seble is a 54 year old, presenting for the following health issues:  ER F/U    HPI     ED/UC Followup:    Facility:  [facility]  Date of visit: 1/21/25  Reason for visit: Gastroenteritis   Current Status: feels like she needs to gag, sticks finger down throat to vomit    Patient has had this behavior for a while.  Patient's guardian is here with her today and wonders if the patient needs an endoscopy, if this is Seble's way of telling us something else is going on.        Review of Systems  Constitutional, HEENT, cardiovascular, pulmonary, gi and gu systems are negative, except as otherwise noted.      Objective    BP 98/65 (BP Location: Left arm, Patient Position: Sitting, Cuff Size: Adult Regular)   Pulse 89   Temp 97.8  F (36.6  C) (Tympanic)   Resp 18   Ht 1.422 m (4' 8\")   Wt 45.8 kg (101 lb)   LMP  (LMP Unknown)   SpO2 96%   Breastfeeding No   BMI 22.64 kg/m    Body mass index is 22.64 kg/m .  Physical Exam "   GENERAL: alert and no distress  PSYCH: mentation appears normal, affect normal/bright          Signed Electronically by: Clarissa Santos MD

## 2025-01-21 NOTE — ED TRIAGE NOTES
"Patient arrives by [facility] from the [facility] where she lives. Patient states she took her medication this morning and \"the pills made her tummy hurt.\" EMS reports that staff at the [facility] stated she lowered herself to the ground and forced herself to puke. BP and SpO2 monitors placed. Call light within reach. Patient is afebrile on arrival.     Triage Assessment (Adult)       Row Name 01/21/25 0835          Triage Assessment    Airway WDL WDL        Respiratory WDL    Respiratory WDL WDL        Skin Circulation/Temperature WDL    Skin Circulation/Temperature WDL WDL        Cardiac WDL    Cardiac WDL WDL        Cognitive/Neuro/Behavioral WDL    Cognitive/Neuro/Behavioral WDL WDL  Patient has down syndrome and appears to be at baseline, per facility                     "

## 2025-01-21 NOTE — ED NOTES
EMS reported no paperwork provided by [facility] staff. Hx of Downs Syndrome, hypothyroidism, and hyper lipidemia reported to EMS by staff.     Guardian: Yamileth Montoya [phone]

## 2025-01-21 NOTE — DISCHARGE INSTRUCTIONS
Return to the emergency department for symptoms or new concerning symptoms.  Follow-up with primary care provider within the next week for any ongoing symptoms.  Use the Zofran to help with your nausea.  Do your best to stay hydrated

## 2025-01-21 NOTE — ED PROVIDER NOTES
[facility]  ED Provider Note    Chief Complaint   Patient presents with    Nausea & Vomiting     History:  Aaliyah Dean is a 54 year old female with Down syndrome who presents to the emergency department today complaining of nausea vomiting.  She states the pills that she takes make her tummy hurt.  It is reported that the group home staff states that she puts her fingers down her throat to make herself vomit.  No other complain    Review of Systems   Performed; see HPI for pertinent positives and negatives.     Medical history, surgical history, and social history was reviewed.  Nursing documentation, triage note, and vitals were reviewed.    Vitals:  BP: 113/64  Pulse: 78  Temp: 97.6  F (36.4  C)  Resp: 16  SpO2: 96 %    Physical Exam:  Constitutional: Alert and conversant. NAD   HENT: NCAT   Eyes: Normal pupils   Neck: supple   CV: No pallor  Pulmonary/Chest: Non-labored respirations  Abdominal: non-distended generalized tenderness to palpation negative Gibbs sign no obvious specific pain at McBurney's point   MSK: DUQUE.   Neuro: Alert and appropriate   Skin: Warm and dry. No diaphoresis. No rashes on exposed skin    Psych: Appropriate mood and affect       MDM:      ED Course as of 01/21/25 1118   Tue Jan 21, 2025   1117 Pt with nausea, vomiting and diarrhea  Differential includes but is not limited to food poisoning, gastroenteritis, pancreatitis, appendicitis, GERD, SBO, gastroparesis, gastric outlet obstruction, gastritis, biliary colic, renal colic/pyelonephritis, intracranial etiology (e.g. stroke, ich, increased ICP), peripheral vertigo, C. diff infection, parasitic infection, bacterial etiologies (such as Yersinia, Shigella, E. Coli, campylobacter, salmonella etc), laxative use.  Vitals acceptable and overall reassuring   Exam reassuring, benign abdomen  Based on hx, exam, clinical presentation, vitals, the likelihood of catastrophic intraabdominal etiology or surgical process is  not sufficiently low to obviate the need for advanced imaging. Therefore, CT was performed with no acute findings requiring intervention, there is intestinal wall thickening consistent with gastroenteritis, especially given her nausea and vomiting. Based on hx, exam, clinical presentation and vitals, laboratory evaluation indicated with no acute findings requiring intervention. Symptoms controlled in the ED with zofran an d IVF. Based on the degree of clinically apparent hypovolemia, IV access and fluid resuscitation was not indicated. No clinical signs of end organ failure. Pt ultimately passed PO trial. Given low likelihood of bacterial etiology, no antibiotics prescribed. Pt discharged in stable condition with all questions answered and return precautions given.        Procedures:  Procedures        Impression:  Final diagnoses:   Gastroenteritis            Dexter Quintero MD  01/21/25 1112

## 2025-01-21 NOTE — ED NOTES
AVS reviewed with Yamileth Montoya (sister and guardian) via phone. All questions and concerns answered. Education reviewed, pt states no further questions at this time.     Yamileth will be picking patient up in 10 minutes.

## 2025-01-23 ENCOUNTER — OFFICE VISIT (OUTPATIENT)
Dept: FAMILY MEDICINE | Facility: OTHER | Age: 55
End: 2025-01-23
Attending: FAMILY MEDICINE
Payer: MEDICARE

## 2025-01-23 VITALS
SYSTOLIC BLOOD PRESSURE: 98 MMHG | RESPIRATION RATE: 18 BRPM | OXYGEN SATURATION: 96 % | HEART RATE: 89 BPM | HEIGHT: 56 IN | DIASTOLIC BLOOD PRESSURE: 65 MMHG | WEIGHT: 101 LBS | BODY MASS INDEX: 22.72 KG/M2 | TEMPERATURE: 97.8 F

## 2025-01-23 DIAGNOSIS — K52.9 ACUTE GASTROENTERITIS: Primary | ICD-10-CM

## 2025-01-23 DIAGNOSIS — F50.89 SELF INDUCED VOMITING: ICD-10-CM

## 2025-01-23 DIAGNOSIS — R13.10 DYSPHAGIA, UNSPECIFIED TYPE: ICD-10-CM

## 2025-01-23 PROCEDURE — G0463 HOSPITAL OUTPT CLINIC VISIT: HCPCS

## 2025-01-23 ASSESSMENT — PAIN SCALES - GENERAL: PAINLEVEL_OUTOF10: NO PAIN (0)

## 2025-02-22 ENCOUNTER — HOSPITAL ENCOUNTER (EMERGENCY)
Facility: HOSPITAL | Age: 55
Discharge: HOME | End: 2025-02-22
Attending: EMERGENCY MEDICINE
Payer: MEDICARE

## 2025-02-22 VITALS
TEMPERATURE: 99.3 F | HEART RATE: 82 BPM | DIASTOLIC BLOOD PRESSURE: 79 MMHG | RESPIRATION RATE: 16 BRPM | OXYGEN SATURATION: 97 % | SYSTOLIC BLOOD PRESSURE: 137 MMHG

## 2025-02-22 DIAGNOSIS — K62.3 RECTAL PROLAPSE: ICD-10-CM

## 2025-02-22 PROCEDURE — 99283 EMERGENCY DEPT VISIT LOW MDM: CPT

## 2025-02-22 PROCEDURE — 99283 EMERGENCY DEPT VISIT LOW MDM: CPT | Performed by: EMERGENCY MEDICINE

## 2025-02-22 RX ORDER — DOCUSATE SODIUM 100 MG/1
100 CAPSULE, LIQUID FILLED ORAL 2 TIMES DAILY
Qty: 28 CAPSULE | Refills: 0 | Status: SHIPPED | OUTPATIENT
Start: 2025-02-22 | End: 2025-03-08

## 2025-02-22 ASSESSMENT — COLUMBIA-SUICIDE SEVERITY RATING SCALE - C-SSRS
6. HAVE YOU EVER DONE ANYTHING, STARTED TO DO ANYTHING, OR PREPARED TO DO ANYTHING TO END YOUR LIFE?: NO
1. IN THE PAST MONTH, HAVE YOU WISHED YOU WERE DEAD OR WISHED YOU COULD GO TO SLEEP AND NOT WAKE UP?: NO
2. HAVE YOU ACTUALLY HAD ANY THOUGHTS OF KILLING YOURSELF IN THE PAST MONTH?: NO

## 2025-02-22 ASSESSMENT — ACTIVITIES OF DAILY LIVING (ADL): ADLS_ACUITY_SCORE: 41

## 2025-02-23 ASSESSMENT — ENCOUNTER SYMPTOMS
FEVER: 0
COUGH: 0
CHILLS: 0
SHORTNESS OF BREATH: 0

## 2025-02-23 NOTE — ED NOTES
Updates provided to [facility] group home, and report given on pt discharge instructions. Pt legal guardian Liza also updated on pt condition and disposition.

## 2025-02-23 NOTE — ED NOTES
At bedside with provider to complete a rectal exam, no prolapse seen, just external hemorrhoids. Pt states she has to have a stool. Placed pt on a bedside commode.

## 2025-02-23 NOTE — ED TRIAGE NOTES
"Pt presents via [place] ambulance for \"rectal prolapse\", per ambulance personnel EMS was called to bring pt due to her having a bowel movement, in which staff saw part of her rectum outside of her body, and it went back in. Per EMS pt was also having bleeding, and EMS was told that pt is on her period. Pt reports getting her \"period\" and having abdominal cramps.         "

## 2025-02-23 NOTE — DISCHARGE INSTRUCTIONS
If your rectum prolapses again and won't go back in on its own after a few minutes, pour some sugar on it and wait until it goes back in on its own.

## 2025-02-23 NOTE — ED NOTES
Provider notified that pt was in fact prolapsed after straining on the bedside commode, provided came to bedside to confirm. Prolapsed confirmed.

## 2025-02-23 NOTE — ED NOTES
Returned to bedside to apply sugar to the prolapsed rectum, and found that her rectum had returned back into her. Provider aware.

## 2025-02-23 NOTE — ED PROVIDER NOTES
History     Chief Complaint   Patient presents with    Rectal/perineal Pain     HPI  Aaliyah Dean is a 54 year old female who is here with reported rectal prolapse. Denies pain at this time.    Allergies:  Allergies   Allergen Reactions    Brimonidine Other (See Comments)     Pain, swelling and erythema    Amoxicillin     Cephalexin Monohydrate      Keflex         Problem List:    Patient Active Problem List    Diagnosis Date Noted    Macrocytic anemia 12/24/2024     Priority: Medium    Thrombocytopenia 12/24/2024     Priority: Medium    Hypomagnesemia 12/23/2024     Priority: Medium    Restlessness and agitation 12/22/2024     Priority: Medium    Aspiration pneumonia (H) 12/21/2024     Priority: Medium    Leukopenia 12/21/2024     Priority: Medium    Liver lesion, right lobe 12/21/2024     Priority: Medium    Anxiety 06/20/2019     Priority: Medium    ACP (advance care planning) 06/12/2018     Priority: Medium    Allergic rhinitis 11/15/2010     Priority: Medium    Other acne 11/15/2010     Priority: Medium    Impulse control disorder 11/15/2010     Priority: Medium    Dysphagia 11/13/2007     Priority: Medium    Convulsions (H) 05/16/2007     Priority: Medium    Disturbance of conduct 01/05/2006     Priority: Medium    Down's syndrome 06/03/2002     Priority: Medium    Hyperlipidemia 05/09/2002     Priority: Medium    Hypothyroidism 03/21/2001     Priority: Medium        Past Medical History:    Past Medical History:   Diagnosis Date    Acute cystitis with hematuria 12/21/2024    YANDY (acute kidney injury) 12/21/2024    Allergic rhinitis, cause unspecified 11/15/2010    Down's syndrome 06/03/2002    DYSPHAGIA NOS 11/13/2007    Impulse control disorder, unspecified 11/15/2010    Other acne 11/15/2010    Other and unspecified hyperlipidemia 05/09/2002    Other convulsions 05/16/2007    Severe sepsis with septic shock (H) 04/24/2011    Unspecified acquired hypothyroidism 03/21/2001    Unspecified disturbance  of conduct 01/05/2006       Past Surgical History:    Past Surgical History:   Procedure Laterality Date    CHOLECYSTECTOMY  01/2011    ENT SURGERY      EXAM UNDER ANESTHESIA PELVIC N/A 5/4/2016    Procedure: EXAM UNDER ANESTHESIA PELVIC;  Surgeon: Valentin Ferris MD;  Location: [facility]    GENITOURINARY SURGERY      INTERNAL URETHROTOMY    HERNIA REPAIR  march 4 2015    ventral herniorrhaphy with intraperitoneal sepramesh    ileostomy take-down  2011    illeostomy  04/25/2011    ventilation tubes, bilateral         Family History:    Family History   Problem Relation Age of Onset    Cancer Mother         cause of death    Diabetes No family hx of        Social History:  Marital Status:  Single [1]  Social History     Tobacco Use    Smoking status: Never    Smokeless tobacco: Never    Tobacco comments:     no passive exposure   Vaping Use    Vaping status: Never Used   Substance Use Topics    Alcohol use: No    Drug use: No        Medications:    acetaminophen (TYLENOL) 500 MG tablet  ACETAMINOPHEN -25 MG tablet  calcium carbonate-vitamin D (CALCIUM-VITAMIN D3) 250-3.125 MG-MCG TABS per tablet  chlorhexidine (PERIDEX) 0.12 % solution  docusate sodium (COLACE) 100 MG capsule  FEROSUL 325 (65 Fe) MG tablet  fish oil-omega-3 fatty acids 1000 MG capsule  fluticasone (FLONASE) 50 MCG/ACT nasal spray  GAS RELIEF 125 MG CAPS  guanFACINE (TENEX) 1 MG tablet  hydrOXYzine HCl (ATARAX) 25 MG tablet  ibuprofen (ADVIL,MOTRIN) 400 MG tablet  levonorgestrel-ethinyl estradiol (SEASONALE) 0.15-0.03 MG tablet  levothyroxine (SYNTHROID/LEVOTHROID) 137 MCG tablet  Lidocaine (GNP LIDOCAINE PAIN RELIEF) 4 % Patch  loratadine (CLARITIN) 10 MG tablet  naltrexone (DEPADE/REVIA) 50 MG tablet  omeprazole (PRILOSEC) 40 MG DR capsule  ondansetron (ZOFRAN ODT) 4 MG ODT tab  risperiDONE (RISPERDAL) 0.5 MG tablet  sertraline (ZOLOFT) 100 MG tablet  simvastatin (ZOCOR) 10 MG tablet  Skin Protectants, Misc. (EUCERIN) cream  triamcinolone  (ARISTOCORT HP) 0.5 % external cream  triamcinolone (KENALOG) 0.1 % cream  vitamin C (ASCORBIC ACID) 500 MG tablet          Review of Systems   Constitutional:  Negative for chills and fever.   Respiratory:  Negative for cough and shortness of breath.    All other systems reviewed and are negative.      Physical Exam   BP: 137/79  Pulse: 82  Temp: 99.3  F (37.4  C)  Resp: 16  SpO2: 97 %      Physical Exam  Constitutional:       General: She is not in acute distress.     Appearance: Normal appearance.   HENT:      Head: Normocephalic and atraumatic.      Right Ear: External ear normal.      Left Ear: External ear normal.      Nose: Nose normal.   Eyes:      General: No scleral icterus.     Extraocular Movements: Extraocular movements intact.   Pulmonary:      Effort: Pulmonary effort is normal. No respiratory distress.   Genitourinary:     Comments: On initial exam, no rectal prolapse, was prolapsed after a bowel movement, when sugar was brought in, was back in place  Musculoskeletal:         General: No deformity or signs of injury.   Skin:     General: Skin is dry.      Capillary Refill: Capillary refill takes less than 2 seconds.      Coloration: Skin is not jaundiced.   Neurological:      Mental Status: She is alert. Mental status is at baseline.   Psychiatric:         Mood and Affect: Mood normal.         Behavior: Behavior normal.         ED Course        Procedures             Critical Care time:               No results found for this or any previous visit (from the past 24 hours).    Medications - No data to display    Assessments & Plan (with Medical Decision Making)     I have reviewed the nursing notes.    I have reviewed the findings, diagnosis, plan and need for follow up with the patient.          Medical Decision Making  The patient's presentation was of low complexity (2+ clearly self-limited or minor problems).    The patient's evaluation involved:  an assessment requiring an independent historian  (EMS, NH)    The patient's management necessitated only low risk treatment.    55 yo f here w/ rectal prolapse. No longer prolapsed. Sent home with stool softeners and verbal instructions on how to get it back in if it occurs again and does not go back in on its own.    Discharge Medication List as of 2/22/2025  9:14 PM        START taking these medications    Details   docusate sodium (COLACE) 100 MG capsule Take 1 capsule (100 mg) by mouth 2 times daily for 14 days., Disp-28 capsule, R-0, E-Prescribe             Final diagnoses:   Rectal prolapse       2/22/2025   [facility]       Marek Garces MD  02/23/25 [id number]

## 2025-02-25 ENCOUNTER — OFFICE VISIT (OUTPATIENT)
Dept: CARE COORDINATION | Facility: OTHER | Age: 55
End: 2025-02-25
Attending: INTERNAL MEDICINE
Payer: MEDICARE

## 2025-02-25 ENCOUNTER — APPOINTMENT (OUTPATIENT)
Dept: LAB | Facility: OTHER | Age: 55
End: 2025-02-25
Attending: INTERNAL MEDICINE
Payer: MEDICARE

## 2025-02-25 VITALS
HEIGHT: 56 IN | OXYGEN SATURATION: 98 % | RESPIRATION RATE: 20 BRPM | HEART RATE: 95 BPM | BODY MASS INDEX: 22.5 KG/M2 | SYSTOLIC BLOOD PRESSURE: 124 MMHG | WEIGHT: 100 LBS | TEMPERATURE: 98 F | DIASTOLIC BLOOD PRESSURE: 70 MMHG

## 2025-02-25 DIAGNOSIS — N39.42 URINARY INCONTINENCE WITHOUT SENSORY AWARENESS: Primary | ICD-10-CM

## 2025-02-25 DIAGNOSIS — K90.9 DIARRHEA DUE TO MALABSORPTION: Primary | ICD-10-CM

## 2025-02-25 DIAGNOSIS — D50.9 IRON DEFICIENCY ANEMIA, UNSPECIFIED IRON DEFICIENCY ANEMIA TYPE: ICD-10-CM

## 2025-02-25 DIAGNOSIS — R19.7 DIARRHEA DUE TO MALABSORPTION: Primary | ICD-10-CM

## 2025-02-25 PROCEDURE — G0463 HOSPITAL OUTPT CLINIC VISIT: HCPCS | Mod: 25

## 2025-02-25 PROCEDURE — G0463 HOSPITAL OUTPT CLINIC VISIT: HCPCS

## 2025-02-25 PROCEDURE — 36415 COLL VENOUS BLD VENIPUNCTURE: CPT | Mod: ZL | Performed by: INTERNAL MEDICINE

## 2025-02-25 PROCEDURE — 86364 TISS TRNSGLTMNASE EA IG CLAS: CPT | Mod: ZL | Performed by: INTERNAL MEDICINE

## 2025-02-25 ASSESSMENT — PAIN SCALES - GENERAL: PAINLEVEL_OUTOF10: NO PAIN (0)

## 2025-02-25 NOTE — PROGRESS NOTES
Gastroenterlogy Consult      CC/REFERRING MD:  No ref. provider found    Chief Complaint: Loose stools, anemia, self-induced vomiting      Past Medical History:  Past Medical History:   Diagnosis Date    Acute cystitis with hematuria 12/21/2024    YANDY (acute kidney injury) 12/21/2024    Allergic rhinitis, cause unspecified 11/15/2010    Down's syndrome 06/03/2002    DYSPHAGIA NOS 11/13/2007    Impulse control disorder, unspecified 11/15/2010    Other acne 11/15/2010    Other and unspecified hyperlipidemia 05/09/2002    Other convulsions 05/16/2007    Severe sepsis with septic shock (H) 04/24/2011    IMO Update 10 2016      Unspecified acquired hypothyroidism 03/21/2001    Unspecified disturbance of conduct 01/05/2006         HPI:  Seble is a 54-year-old with Down syndrome she was seen along with 2 of her caregivers.  Prior to her appointment extensive records were reviewed she is largely noncommunicative.  According to her caregivers for the last 10 months she has been sticking her fingers down her throat to force vomiting she usually does this 6 to 8 hours after eating and the emesis consists of bile colored liquid never any food.  Over that time she has lost about 10 pounds.  She also strains to defecate and actually has developed rectal prolapse as a result she has mild chronic diarrhea with 3-4 soft bowel movements a day she has iron deficiency and is on iron sulfate.  He also has B12 deficiency he denies any abdominal pain there is not dysphagia there have never been food impactions she denies nausea she does have GERD and is taking omeprazole 40 mg each morning    PREVIOUS SURGERIES:  Past Surgical History:   Procedure Laterality Date    CHOLECYSTECTOMY  01/2011    ENT SURGERY      EXAM UNDER ANESTHESIA PELVIC N/A 5/4/2016    Procedure: EXAM UNDER ANESTHESIA PELVIC;  Surgeon: Valentin Ferris MD;  Location: [facility]    GENITOURINARY SURGERY      INTERNAL URETHROTOMY    HERNIA REPAIR  march 4 2015    ventral  herniorrhaphy with intraperitoneal sepramesh    ileostomy take-down  2011    illeostomy  04/25/2011    ventilation tubes, bilateral         MEDICATIONS:    Current Outpatient Medications:     acetaminophen (TYLENOL) 500 MG tablet, Take 2 tablets (1,000 mg) by mouth every morning. May also take 2 tablets (1,000 mg) daily as needed for mild pain., Disp: 100 tablet, Rfl: 2    ACETAMINOPHEN -25 MG tablet, Take 2 tablets by mouth At Bedtime, Disp: 60 tablet, Rfl: 5    calcium carbonate-vitamin D (CALCIUM-VITAMIN D3) 250-3.125 MG-MCG TABS per tablet, TAKE ONE (1) TABLET BY MOUTH TWICE DAILY WITH MEALS, Disp: 180 tablet, Rfl: 2    chlorhexidine (PERIDEX) 0.12 % solution, SWAB ON TEETH AND GUMS FOR 30 SECONDS TWICE A DAY, Disp: 473 mL, Rfl: 11    docusate sodium (COLACE) 100 MG capsule, Take 1 capsule (100 mg) by mouth 2 times daily for 14 days., Disp: 28 capsule, Rfl: 0    FEROSUL 325 (65 Fe) MG tablet, Take 1 tablet (325 mg) by mouth 2 times daily, Disp: 180 tablet, Rfl: 3    fish oil-omega-3 fatty acids 1000 MG capsule, Take 1,000 mg by mouth 3 times daily, Disp: 100 capsule, Rfl: 11    fluticasone (FLONASE) 50 MCG/ACT nasal spray, INSTILL 2 SPRAYS INTO BOTH NOSTRILS DAILY SHAKE GENTLY, Disp: 16 g, Rfl: 5    GAS RELIEF 125 MG CAPS, Take 1-2 capsules by mouth 2 times daily as needed, Disp: 30 capsule, Rfl: 3    guanFACINE (TENEX) 1 MG tablet, Take 1 tablet (1 mg) by mouth 2 times daily, Disp: 60 tablet, Rfl: 5    hydrOXYzine HCl (ATARAX) 25 MG tablet, Take 1-2 tablets (25-50 mg) by mouth every 6 hours as needed for anxiety., Disp: 60 tablet, Rfl: 1    ibuprofen (ADVIL,MOTRIN) 400 MG tablet, Take 1 tablet by mouth every 4-6 hours as needed, Disp: 120 tablet, Rfl: 2    levonorgestrel-ethinyl estradiol (SEASONALE) 0.15-0.03 MG tablet, TAKE ONE (1) TABLET BY MOUTH DAILY BRODIE/JOLESSA, Disp: 91 tablet, Rfl: 3    levothyroxine (SYNTHROID/LEVOTHROID) 137 MCG tablet, Take 1 tablet (137 mcg) by mouth daily, Disp: 30  tablet, Rfl: 11    Lidocaine (GNP LIDOCAINE PAIN RELIEF) 4 % Patch, Place onto the skin daily as needed for moderate pain To prevent lidocaine toxicity, patient should be patch free for 12 hrs daily., Disp: 30 patch, Rfl: 5    loratadine (CLARITIN) 10 MG tablet, Take 1 tablet (10 mg) by mouth daily, Disp: 30 tablet, Rfl: 10    naltrexone (DEPADE/REVIA) 50 MG tablet, Take 1 tablet (50 mg) by mouth every morning, Disp: 30 tablet, Rfl: 11    omeprazole (PRILOSEC) 40 MG DR capsule, Take 1 capsule (40 mg) by mouth daily, Disp: 30 capsule, Rfl: 11    ondansetron (ZOFRAN ODT) 4 MG ODT tab, Take 1 tablet (4 mg) by mouth every 8 hours as needed., Disp: 15 tablet, Rfl: 0    risperiDONE (RISPERDAL) 0.5 MG tablet, Take 1 tablet by mouth three times a day, Disp: 90 tablet, Rfl: 11    sertraline (ZOLOFT) 100 MG tablet, Take 1 tablet by mouth in the morning, Disp: 30 tablet, Rfl: 5    simvastatin (ZOCOR) 10 MG tablet, TAKE ONE (1) TABLET BY MOUTH DAILY, Disp: 30 tablet, Rfl: 10    Skin Protectants, Misc. (EUCERIN) cream, , Disp: , Rfl:     triamcinolone (ARISTOCORT HP) 0.5 % external cream, Apply topically 2 times daily as needed (eczema on left hand) , may use for three weeks then stop for one week then resume, Disp: 15 g, Rfl: 2    triamcinolone (KENALOG) 0.1 % cream, Apply sparingly to affected area twice daily as needed, Disp: 80 g, Rfl: 1    vitamin C (ASCORBIC ACID) 500 MG tablet, TAKE ONE (1) TABLET BY MOUTH DAILY, Disp: 100 tablet, Rfl: 3    ALLERGIES:     Allergies   Allergen Reactions    Brimonidine Other (See Comments)     Pain, swelling and erythema    Amoxicillin     Cephalexin Monohydrate      Keflex         FAMILY HISTORY:  Family History   Problem Relation Age of Onset    Cancer Mother         cause of death    Diabetes No family hx of        SOCIAL HISTORY:  Social History     Socioeconomic History    Marital status: Single     Spouse name: Not on file    Number of children: Not on file    Years of education: Not  on file    Highest education level: Not on file   Occupational History    Not on file   Tobacco Use    Smoking status: Never    Smokeless tobacco: Never    Tobacco comments:     no passive exposure   Vaping Use    Vaping status: Never Used   Substance and Sexual Activity    Alcohol use: No    Drug use: No    Sexual activity: Never   Other Topics Concern     Service Not Asked    Blood Transfusions Yes    Caffeine Concern Yes     Comment: 1 cup coffee daily    Occupational Exposure Not Asked    Hobby Hazards Not Asked    Sleep Concern Not Asked    Stress Concern Not Asked    Weight Concern Not Asked    Special Diet Not Asked    Back Care Not Asked    Exercise Yes     Comment: walking, dance    Bike Helmet Not Asked    Seat Belt Not Asked    Self-Exams Not Asked    Parent/sibling w/ CABG, MI or angioplasty before 65F 55M? No   Social History Narrative    Not on file     Social Drivers of Health     Financial Resource Strain: Low Risk  (11/18/2024)    Financial Resource Strain     Within the past 12 months, have you or your family members you live with been unable to get utilities (heat, electricity) when it was really needed?: No   Food Insecurity: No Food Insecurity (12/21/2024)    Received from [facility] [employer] [facility]    Hunger Vital Sign     Worried About Running Out of Food in the Last Year: Never true     Ran Out of Food in the Last Year: Never true   Transportation Needs: No Transportation Needs (12/21/2024)    Received from [facility]    PRAPARE - Transportation     Lack of Transportation (Medical): No     Lack of Transportation (Non-Medical): No   Physical Activity: Unknown (11/18/2024)    Exercise Vital Sign     Days of Exercise per Week: 0 days     Minutes of Exercise per Session: Not on file   Stress: No Stress Concern Present (11/18/2024)    [employer] - Occupational Stress Questionnaire     Feeling of Stress :  "Only a little   Social Connections: Unknown (11/18/2024)    Social Connection and Isolation Panel [NHANES]     Frequency of Communication with Friends and Family: Not on file     Frequency of Social Gatherings with Friends and Family: Once a week     Attends [employer]ology Services: Not on file     Active Member of Clubs or Organizations: Not on file     Attends Club or Organization Meetings: Not on file     Marital Status: Not on file   Interpersonal Safety: Patient Declined (12/21/2024)    Received from [facility] IP[facility]     Do you feel UNSAFE in any of your personal relationships with your family members or any other acquaintances?: Deferred   Housing Stability: Low Risk  (12/21/2024)    [facility] from [facility]    Housing Stability Vital Sign     Unable to Pay for Housing in the Last Year: No     Number of Times Moved in the Last Year: 0     Homeless in the Last Year: No         PHYSICAL EXAM:  Constitutional: aaox3, cooperative, pleasant, not dyspneic/diaphoretic, no acute distress  Vitals reviewed: /70 (BP Location: Right arm, Cuff Size: Adult Regular)   Pulse 95   Temp 98  F (36.7  C) (Tympanic)   Resp 20   Ht 1.422 m (4' 8\")   Wt 45.4 kg (100 lb)   LMP  (LMP Unknown)   SpO2 98%   BMI 22.42 kg/m    Wt:   Wt Readings from Last 2 Encounters:   02/25/25 45.4 kg (100 lb)   01/23/25 45.8 kg (101 lb)      Eyes: Sclera anicteric/injected  Ears/nose/mouth/throat: Normal oropharynx without ulcers or exudate, mucus membranes moist, hearing intact  Neck: supple, thyroid normal size  CV: No edema  Respiratory: Unlabored breathing  Lymph: No axillary, submandibular, supraclavicular or inguinal lymphadenopathy  Abd:  Nondistended, +bs, no hepatosplenomegaly, nontender, no peritoneal signs  Skin: warm, perfused, no jaundice  Psych: Normal affect  MSK: Normal gait      ASSESSMENT/PLAN:  Chronic diarrhea with iron deficiency B12 " deficiency as above given the association between Downs and celiac disease a TTG will be obtained there is also an association with microscopic colitis so a stool calprotectin will also be checked.  I think the patient's behavior of sticking her finger in her throat to induce emesis of gastric secretions is voluntary it would not appear to be related to any organic disease per se.  But instead is likely to be more of a behavioral issue.  Also of concern is her straining to defecate I have discussed with her caregivers that she should not be allowed to sit on the toilet straining for extended periods of time which is what they have been allowing her to do this is not productive and it has resulted in rectal prolapse and hemorrhoids.  She should be allowed 1 or 2 minutes at the most to attempt defecation and at that point she should be removed from the toilet.      Mac Hahn MD

## 2025-02-25 NOTE — PATIENT INSTRUCTIONS
Thank you for allowing Dr Mac Chan  to participate in your care. Please call our office at [phone] with scheduling questions or with any other questions or concerns.

## 2025-02-26 LAB
TTG IGA SER-ACNC: 1 U/ML
TTG IGG SER-ACNC: 1.5 U/ML

## 2025-02-28 ENCOUNTER — TELEPHONE (OUTPATIENT)
Dept: FAMILY MEDICINE | Facility: OTHER | Age: 55
End: 2025-02-28

## 2025-02-28 DIAGNOSIS — K64.9 HEMORRHOIDS, UNSPECIFIED HEMORRHOID TYPE: Primary | ICD-10-CM

## 2025-02-28 RX ORDER — HYDROCORTISONE 25 MG/G
CREAM TOPICAL 2 TIMES DAILY PRN
Qty: 30 G | Refills: 0 | Status: SHIPPED | OUTPATIENT
Start: 2025-02-28

## 2025-04-13 NOTE — TELEPHONE ENCOUNTER
tenex      Last Written Prescription Date: 4/27/17  Last Fill Quantity: 45,  # refills: 5   Last Office Visit with G, P or [facility] prescribing provider: 5/16/17                                                1 - No significant disability. Able to carry out all usual activities, despite some symptoms 1.67

## 2025-05-06 DIAGNOSIS — E78.5 HYPERLIPIDEMIA LDL GOAL <100: ICD-10-CM

## 2025-05-06 RX ORDER — SIMVASTATIN 10 MG
10 TABLET ORAL
Qty: 30 TABLET | Refills: 7 | Status: SHIPPED | OUTPATIENT
Start: 2025-05-06

## 2025-05-06 NOTE — TELEPHONE ENCOUNTER
Simvastatin      Last Written Prescription Date:  6/11/24  Last Fill Quantity: 30,   # refills: 10  Last Office Visit: 1/23/25  Future Office visit:       Routing refill request to provider for review/approval because:

## 2025-05-12 DIAGNOSIS — M19.011 ARTHRITIS OF RIGHT SHOULDER REGION: ICD-10-CM

## 2025-05-12 RX ORDER — PSEUDOEPHED/ACETAMINOPH/DIPHEN 30MG-500MG
TABLET ORAL
Qty: 100 TABLET | Refills: 2 | Status: SHIPPED | OUTPATIENT
Start: 2025-05-12

## 2025-05-12 NOTE — TELEPHONE ENCOUNTER
acetaminophen 500 mg tablet         Last Written Prescription Date:  1/8/25  Last Fill Quantity: 60,   # refills: 5  Last Office Visit: 1/23/25  Future Office visit:       Routing refill request to provider for review/approval because:    Analgesics (Non-Narcotic Tylenol and ASA Only) [id number]/12/2025 11:13 AM   Protocol Details Medication matches indication

## 2025-05-30 ENCOUNTER — OFFICE VISIT (OUTPATIENT)
Dept: FAMILY MEDICINE | Facility: OTHER | Age: 55
End: 2025-05-30
Attending: FAMILY MEDICINE
Payer: MEDICARE

## 2025-05-30 VITALS
RESPIRATION RATE: 18 BRPM | DIASTOLIC BLOOD PRESSURE: 60 MMHG | SYSTOLIC BLOOD PRESSURE: 100 MMHG | BODY MASS INDEX: 25 KG/M2 | TEMPERATURE: 97.7 F | OXYGEN SATURATION: 98 % | HEART RATE: 75 BPM | WEIGHT: 111.5 LBS

## 2025-05-30 DIAGNOSIS — L30.9 ECZEMA, UNSPECIFIED TYPE: Primary | ICD-10-CM

## 2025-05-30 PROCEDURE — G0463 HOSPITAL OUTPT CLINIC VISIT: HCPCS

## 2025-05-30 ASSESSMENT — PAIN SCALES - GENERAL: PAINLEVEL_OUTOF10: NO PAIN (0)

## 2025-05-30 NOTE — PROGRESS NOTES
Assessment & Plan     1. Eczema, unspecified type (Primary)  Use medications from standing orders, triamcinolone in the morning and barrier cream/Corona at night.  Follow-up if no improvement noted.        The longitudinal plan of care for the diagnosis(es)/condition(s) as documented were addressed during this visit. Due to the added complexity in care, I will continue to support Seble in the subsequent management and with ongoing continuity of care.      Follow-up  Return if symptoms worsen or fail to improve.    Subjective   Seble is a 54 year old, presenting for the following health issues:  Derm Problem        5/30/2025    10:19 AM   Additional Questions   Roomed by Senthil lpn   Accompanied by staff         5/30/2025   Forms   Any forms needing to be completed Yes     History of Present Illness       Reason for visit:  Chaffing between legs  Symptom onset:  1-2 weeks ago  Symptoms include:  Red inflamed skin  Symptom intensity:  Moderate  Symptom progression:  Improving  Had these symptoms before:  No  What makes it better:  Barrier cream   She is taking medications regularly.        Concern - chaffing between thighs  Onset: 2 weeks since first noticed  Description: red patched between thighs that look inflamed  Intensity: started of severe but has been improving  Progression of Symptoms:  improving  Accompanying Signs & Symptoms: none  Previous history of similar problem: none  Precipitating factors:        Worsened by: started wearing over the night briefs and later on noticed that there was chaffing  Alleviating factors:        Improved by: putting on corona or gretchen cream throughout the day seems to be helping  Therapies tried and outcome: corona and gretchen barrier cream        Review of Systems  Constitutional, HEENT, cardiovascular, pulmonary, gi and gu systems are negative, except as otherwise noted.      Objective    /60 (BP Location: Right arm, Patient Position: Sitting, Cuff Size: Adult  Regular)   Pulse 75   Temp 97.7  F (36.5  C) (Tympanic)   Resp 18   Wt 50.6 kg (111 lb 8 oz)   LMP 05/29/2025 (Exact Date)   SpO2 98%   BMI 25.00 kg/m    Body mass index is 25 kg/m .  Physical Exam   GENERAL: alert and no distress  SKIN: erythematous dry patches on inner thighs, consistent with eczema, possibly some irritation/chafing as well  PSYCH: mentation appears normal, affect normal/bright          Signed Electronically by: Clarissa Santos MD

## 2025-06-07 DIAGNOSIS — F41.9 ANXIETY: ICD-10-CM

## 2025-06-09 RX ORDER — SERTRALINE HYDROCHLORIDE 100 MG/1
100 TABLET, FILM COATED ORAL EVERY MORNING
Qty: 30 TABLET | Refills: 5 | Status: SHIPPED | OUTPATIENT
Start: 2025-06-09

## 2025-06-09 NOTE — TELEPHONE ENCOUNTER
sertraline 100 mg tablet         Last Written Prescription Date:  11/12/24  Last Fill Quantity: 30,   # refills: 5  Last Office Visit: 5/30/25  Future Office visit:       Routing refill request to provider for review/approval because:    SSRIs Protocol [id number]/07/2025 10:04 AM   Protocol Details TAYLER-7 score of less than 5 in past 6 months.            3/8/2022     9:00 AM 8/7/2023     9:31 AM 5/2/2024    10:08 AM   TAYLER-7 SCORE   Total Score  3 (minimal anxiety) 0 (minimal anxiety)   Total Score 3 3 0

## 2025-06-16 ENCOUNTER — HOSPITAL ENCOUNTER (EMERGENCY)
Facility: HOSPITAL | Age: 55
Discharge: HOME | End: 2025-06-17
Attending: STUDENT IN AN ORGANIZED HEALTH CARE EDUCATION/TRAINING PROGRAM
Payer: MEDICARE

## 2025-06-16 DIAGNOSIS — R10.9 ABDOMINAL PAIN, UNSPECIFIED ABDOMINAL LOCATION: ICD-10-CM

## 2025-06-16 LAB
ALBUMIN UR-MCNC: NEGATIVE MG/DL
ANION GAP SERPL CALCULATED.3IONS-SCNC: 11 MMOL/L (ref 7–15)
APPEARANCE UR: CLEAR
BASOPHILS # BLD AUTO: 0.1 10E3/UL (ref 0–0.2)
BASOPHILS NFR BLD AUTO: 1 %
BILIRUB UR QL STRIP: NEGATIVE
BUN SERPL-MCNC: 12 MG/DL (ref 6–20)
CALCIUM SERPL-MCNC: 8.3 MG/DL (ref 8.8–10.4)
CHLORIDE SERPL-SCNC: 105 MMOL/L (ref 98–107)
COLOR UR AUTO: ABNORMAL
CREAT SERPL-MCNC: 0.88 MG/DL (ref 0.51–0.95)
EGFRCR SERPLBLD CKD-EPI 2021: 79 ML/MIN/1.73M2
EOSINOPHIL # BLD AUTO: 0.1 10E3/UL (ref 0–0.7)
EOSINOPHIL NFR BLD AUTO: 1 %
ERYTHROCYTE [DISTWIDTH] IN BLOOD BY AUTOMATED COUNT: 15.1 % (ref 10–15)
GLUCOSE SERPL-MCNC: 92 MG/DL (ref 70–99)
GLUCOSE UR STRIP-MCNC: NEGATIVE MG/DL
HCO3 SERPL-SCNC: 23 MMOL/L (ref 22–29)
HCT VFR BLD AUTO: 35 % (ref 35–47)
HGB BLD-MCNC: 11.3 G/DL (ref 11.7–15.7)
HGB UR QL STRIP: NEGATIVE
IMM GRANULOCYTES # BLD: 0 10E3/UL
IMM GRANULOCYTES NFR BLD: 0 %
KETONES UR STRIP-MCNC: NEGATIVE MG/DL
LEUKOCYTE ESTERASE UR QL STRIP: NEGATIVE
LYMPHOCYTES # BLD AUTO: 1.5 10E3/UL (ref 0.8–5.3)
LYMPHOCYTES NFR BLD AUTO: 21 %
MCH RBC QN AUTO: 34.9 PG (ref 26.5–33)
MCHC RBC AUTO-ENTMCNC: 32.3 G/DL (ref 31.5–36.5)
MCV RBC AUTO: 108 FL (ref 78–100)
MONOCYTES # BLD AUTO: 0.4 10E3/UL (ref 0–1.3)
MONOCYTES NFR BLD AUTO: 6 %
NEUTROPHILS # BLD AUTO: 5.2 10E3/UL (ref 1.6–8.3)
NEUTROPHILS NFR BLD AUTO: 71 %
NITRATE UR QL: NEGATIVE
NRBC # BLD AUTO: 0 10E3/UL
NRBC BLD AUTO-RTO: 0 /100
PH UR STRIP: 5.5 [PH] (ref 4.7–8)
PLATELET # BLD AUTO: 207 10E3/UL (ref 150–450)
POTASSIUM SERPL-SCNC: 4.2 MMOL/L (ref 3.4–5.3)
RBC # BLD AUTO: 3.24 10E6/UL (ref 3.8–5.2)
RBC URINE: 0 /HPF
SODIUM SERPL-SCNC: 139 MMOL/L (ref 135–145)
SP GR UR STRIP: 1.01 (ref 1–1.03)
SQUAMOUS EPITHELIAL: 2 /HPF
UROBILINOGEN UR STRIP-MCNC: NORMAL MG/DL
WBC # BLD AUTO: 7.3 10E3/UL (ref 4–11)
WBC URINE: <1 /HPF

## 2025-06-16 PROCEDURE — 99283 EMERGENCY DEPT VISIT LOW MDM: CPT | Performed by: STUDENT IN AN ORGANIZED HEALTH CARE EDUCATION/TRAINING PROGRAM

## 2025-06-16 PROCEDURE — 36415 COLL VENOUS BLD VENIPUNCTURE: CPT | Performed by: STUDENT IN AN ORGANIZED HEALTH CARE EDUCATION/TRAINING PROGRAM

## 2025-06-16 PROCEDURE — 99283 EMERGENCY DEPT VISIT LOW MDM: CPT

## 2025-06-16 PROCEDURE — 80048 BASIC METABOLIC PNL TOTAL CA: CPT | Performed by: STUDENT IN AN ORGANIZED HEALTH CARE EDUCATION/TRAINING PROGRAM

## 2025-06-16 PROCEDURE — 81001 URINALYSIS AUTO W/SCOPE: CPT | Performed by: STUDENT IN AN ORGANIZED HEALTH CARE EDUCATION/TRAINING PROGRAM

## 2025-06-16 PROCEDURE — 85025 COMPLETE CBC W/AUTO DIFF WBC: CPT | Performed by: STUDENT IN AN ORGANIZED HEALTH CARE EDUCATION/TRAINING PROGRAM

## 2025-06-16 ASSESSMENT — ENCOUNTER SYMPTOMS
FEVER: 0
DIARRHEA: 0
NAUSEA: 0
CHILLS: 0
VOMITING: 0
SHORTNESS OF BREATH: 0

## 2025-06-16 ASSESSMENT — ACTIVITIES OF DAILY LIVING (ADL): ADLS_ACUITY_SCORE: 41

## 2025-06-17 VITALS
SYSTOLIC BLOOD PRESSURE: 104 MMHG | OXYGEN SATURATION: 98 % | TEMPERATURE: 98 F | DIASTOLIC BLOOD PRESSURE: 62 MMHG | HEIGHT: 60 IN | BODY MASS INDEX: 21.78 KG/M2 | RESPIRATION RATE: 16 BRPM | HEART RATE: 76 BPM

## 2025-06-17 LAB
HOLD SPECIMEN: NORMAL
HOLD SPECIMEN: NORMAL

## 2025-06-17 ASSESSMENT — ACTIVITIES OF DAILY LIVING (ADL): ADLS_ACUITY_SCORE: 41

## 2025-06-17 NOTE — ED PROVIDER NOTES
History     Chief Complaint   Patient presents with    Abdominal Pain     HPI  Aaliyah Dean is a 54 year old female who with history of Down syndrome brought from the group home with complaints of abdominal pain.  Patient states she had a chocolate cookie today and after that she started having abdominal cramps.  Currently she has no pain at this time.  She does not recall if she got anything for pain at the facility.  She denies nausea/vomiting/diarrhea, fever/chills, or any other symptoms.    Allergies:  Allergies   Allergen Reactions    Brimonidine Other (See Comments)     Pain, swelling and erythema    Amoxicillin     Cephalexin Monohydrate      Keflex         Problem List:    Patient Active Problem List    Diagnosis Date Noted    Macrocytic anemia 12/24/2024     Priority: Medium    Thrombocytopenia 12/24/2024     Priority: Medium    Hypomagnesemia 12/23/2024     Priority: Medium    Restlessness and agitation 12/22/2024     Priority: Medium    Aspiration pneumonia (H) 12/21/2024     Priority: Medium    Leukopenia 12/21/2024     Priority: Medium    Liver lesion, right lobe 12/21/2024     Priority: Medium    Anxiety 06/20/2019     Priority: Medium    ACP (advance care planning) 06/12/2018     Priority: Medium    Allergic rhinitis 11/15/2010     Priority: Medium    Other acne 11/15/2010     Priority: Medium    Impulse control disorder 11/15/2010     Priority: Medium    Dysphagia 11/13/2007     Priority: Medium    Convulsions (H) 05/16/2007     Priority: Medium    Disturbance of conduct 01/05/2006     Priority: Medium    Down's syndrome 06/03/2002     Priority: Medium    Hyperlipidemia 05/09/2002     Priority: Medium    Hypothyroidism 03/21/2001     Priority: Medium        Past Medical History:    Past Medical History:   Diagnosis Date    Acute cystitis with hematuria 12/21/2024    YANDY (acute kidney injury) 12/21/2024    Allergic rhinitis, cause unspecified 11/15/2010    Down's syndrome 06/03/2002     DYSPHAGIA NOS 11/13/2007    Impulse control disorder, unspecified 11/15/2010    Other acne 11/15/2010    Other and unspecified hyperlipidemia 05/09/2002    Other convulsions 05/16/2007    Severe sepsis with septic shock (H) 04/24/2011    Unspecified acquired hypothyroidism 03/21/2001    Unspecified disturbance of conduct 01/05/2006       Past Surgical History:    Past Surgical History:   Procedure Laterality Date    CHOLECYSTECTOMY  01/2011    ENT SURGERY      EXAM UNDER ANESTHESIA PELVIC N/A 5/4/2016    Procedure: EXAM UNDER ANESTHESIA PELVIC;  Surgeon: Valentin Ferris MD;  Location: [facility]    GENITOURINARY SURGERY      INTERNAL URETHROTOMY    HERNIA REPAIR  march 4 2015    ventral herniorrhaphy with intraperitoneal sepramesh    ileostomy take-down  2011    illeostomy  04/25/2011    ventilation tubes, bilateral         Family History:    Family History   Problem Relation Age of Onset    Cancer Mother         cause of death    Diabetes No family hx of        Social History:  Marital Status:  Single [1]  Social History     Tobacco Use    Smoking status: Never    Smokeless tobacco: Never    Tobacco comments:     no passive exposure   Vaping Use    Vaping status: Never Used   Substance Use Topics    Alcohol use: No    Drug use: No        Medications:    acetaminophen (TYLENOL) 500 MG tablet  ACETAMINOPHEN -25 MG tablet  calcium carbonate-vitamin D (CALCIUM-VITAMIN D3) 250-3.125 MG-MCG TABS per tablet  chlorhexidine (PERIDEX) 0.12 % solution  FEROSUL 325 (65 Fe) MG tablet  fish oil-omega-3 fatty acids 1000 MG capsule  fluticasone (FLONASE) 50 MCG/ACT nasal spray  GAS RELIEF 125 MG CAPS  guanFACINE (TENEX) 1 MG tablet  hydrocortisone 2.5 % cream  hydrOXYzine HCl (ATARAX) 25 MG tablet  ibuprofen (ADVIL,MOTRIN) 400 MG tablet  levonorgestrel-ethinyl estradiol (SEASONALE) 0.15-0.03 MG tablet  levothyroxine (SYNTHROID/LEVOTHROID) 137 MCG tablet  Lidocaine (GNP LIDOCAINE PAIN RELIEF) 4 % Patch  loratadine (CLARITIN) 10 MG  tablet  naltrexone (DEPADE/REVIA) 50 MG tablet  omeprazole (PRILOSEC) 40 MG DR capsule  ondansetron (ZOFRAN ODT) 4 MG ODT tab  risperiDONE (RISPERDAL) 0.5 MG tablet  sertraline (ZOLOFT) 100 MG tablet  simvastatin (ZOCOR) 10 MG tablet  Skin Protectants, Misc. (EUCERIN) cream  triamcinolone (ARISTOCORT HP) 0.5 % external cream  triamcinolone (KENALOG) 0.1 % external cream  vitamin C (ASCORBIC ACID) 500 MG tablet          Review of Systems   Constitutional:  Negative for chills and fever.   HENT:  Negative for congestion.    Respiratory:  Negative for shortness of breath.    Cardiovascular:  Negative for chest pain.   Gastrointestinal:  Negative for diarrhea, nausea and vomiting.       Physical Exam   BP: 124/76  Pulse: 71  Temp: 97.7  F (36.5  C)  Resp: 14  Height: 152.4 cm (5')  SpO2: 96 %      Physical Exam  Vitals and nursing note reviewed.   Constitutional:       General: She is not in acute distress.  Cardiovascular:      Rate and Rhythm: Normal rate.   Pulmonary:      Effort: Pulmonary effort is normal.   Abdominal:      General: Bowel sounds are normal.      Palpations: Abdomen is soft.      Tenderness: There is no abdominal tenderness.      Hernia: No hernia is present.   Neurological:      Mental Status: She is alert.         ED Course        Procedures            Results for orders placed or performed during the hospital encounter of 06/16/25 (from the past 24 hours)   CBC with platelets differential    Narrative    The following orders were created for panel order CBC with platelets differential.  Procedure                               Abnormality         Status                     ---------                               -----------         ------                     CBC with platelets and ...[[id number]]  Abnormal            Final result                 Please view results for these tests on the individual orders.   Basic metabolic panel   Result Value Ref Range    Sodium 139 135 - 145 mmol/L    Potassium  4.2 3.4 - 5.3 mmol/L    Chloride 105 98 - 107 mmol/L    Carbon Dioxide (CO2) 23 22 - 29 mmol/L    Anion Gap 11 7 - 15 mmol/L    Urea Nitrogen 12.0 6.0 - 20.0 mg/dL    Creatinine 0.88 0.51 - 0.95 mg/dL    GFR Estimate 79 >60 mL/min/1.73m2    Calcium 8.3 (L) 8.8 - 10.4 mg/dL    Glucose 92 70 - 99 mg/dL   CBC with platelets and differential   Result Value Ref Range    WBC Count 7.3 4.0 - 11.0 10e3/uL    RBC Count 3.24 (L) 3.80 - 5.20 10e6/uL    Hemoglobin 11.3 (L) 11.7 - 15.7 g/dL    Hematocrit 35.0 35.0 - 47.0 %     (H) 78 - 100 fL    MCH 34.9 (H) 26.5 - 33.0 pg    MCHC 32.3 31.5 - 36.5 g/dL    RDW 15.1 (H) 10.0 - 15.0 %    Platelet Count 207 150 - 450 10e3/uL    % Neutrophils 71 %    % Lymphocytes 21 %    % Monocytes 6 %    % Eosinophils 1 %    % Basophils 1 %    % Immature Granulocytes 0 %    NRBCs per 100 WBC 0 <1 /100    Absolute Neutrophils 5.2 1.6 - 8.3 10e3/uL    Absolute Lymphocytes 1.5 0.8 - 5.3 10e3/uL    Absolute Monocytes 0.4 0.0 - 1.3 10e3/uL    Absolute Eosinophils 0.1 0.0 - 0.7 10e3/uL    Absolute Basophils 0.1 0.0 - 0.2 10e3/uL    Absolute Immature Granulocytes 0.0 <=0.4 10e3/uL    Absolute NRBCs 0.0 10e3/uL   Extra Tube    Narrative    The following orders were created for panel order Extra Tube.  Procedure                               Abnormality         Status                     ---------                               -----------         ------                     Extra Blue Top Tube[[id number]]                             In process                 Extra Red Top Tube[[id number]]                              In process                   Please view results for these tests on the individual orders.   UA with Microscopic reflex to Culture    Specimen: Urine, Midstream   Result Value Ref Range    Color Urine Light Yellow Colorless, Straw, Light Yellow, Yellow    Appearance Urine Clear Clear    Glucose Urine Negative Negative mg/dL    Bilirubin Urine Negative Negative    Ketones Urine Negative  Negative mg/dL    Specific Gravity Urine 1.013 1.003 - 1.035    Blood Urine Negative Negative    pH Urine 5.5 4.7 - 8.0    Protein Albumin Urine Negative Negative mg/dL    Urobilinogen Urine Normal Normal mg/dL    Nitrite Urine Negative Negative    Leukocyte Esterase Urine Negative Negative    RBC Urine 0 <=2 /HPF    WBC Urine <1 <=5 /HPF    Squamous Epithelials Urine 2 (H) <=1 /HPF    Narrative    Urine Culture not indicated       Medications - No data to display    Assessments & Plan (with Medical Decision Making)     I have reviewed the nursing notes.    I have reviewed the findings, diagnosis, plan and need for follow up with the patient.  Patient is brought here from the group home with complaint of abdominal pain.  Patient states that she is currently not having any abdominal pain.  CBC, BMP, and UA are all unremarkable.  On reassessment, patient still does not have any pain.  No indication for further intervention at this time.  Patient will be discharged back to the facility.    Medical Decision Making        New Prescriptions    No medications on file       Final diagnoses:   Abdominal pain, unspecified abdominal location       6/16/2025   [facility]       Azul Andre MD  06/16/25 [id number]

## 2025-06-17 NOTE — ED TRIAGE NOTES
Pt arrived via [facility] for ABD pain that started this evening. Alert and oriented. No signs of distress. Pt states ate a cookie tonight and ABD started cramping. Denies systemic symptoms. GCS 15.

## 2025-06-23 DIAGNOSIS — Q90.9 DOWN'S SYNDROME: ICD-10-CM

## 2025-06-23 RX ORDER — LEVONORGESTREL AND ETHINYL ESTRADIOL 0.15-0.03
KIT ORAL
Qty: 91 TABLET | Refills: 3 | Status: SHIPPED | OUTPATIENT
Start: 2025-06-23

## 2025-06-23 NOTE — TELEPHONE ENCOUNTER
levonorgestrel 0.15 mg-ethinyl estradiol 30 mcg tablets,3 mos pack(91)         Last Written Prescription Date:  6/21/24  Last Fill Quantity: 91,   # refills: 3  Last Office Visit: 5/30/25  Future Office visit:       Routing refill request to provider for review/approval because:  Contraceptives Protocol [id number]/23/2025 03:08 PM   Protocol Details Medication indicated for associated diagnosis

## 2025-06-30 DIAGNOSIS — R13.10 DYSPHAGIA, UNSPECIFIED TYPE: ICD-10-CM

## 2025-06-30 NOTE — TELEPHONE ENCOUNTER
Omeprazole (Prilosec) 40 MG DR capsule     Last Written Prescription Date:  07/11/2024  Last Fill Quantity: 30,   # refills: 11  Last Office Visit: 05/30/2025  Future Office visit:       Routing refill request to provider for review/approval because:  Protocol failed on the Prilosec.     Per health , patient may resume tizanidine at reduced dose. Move up outreach date to assess effect on BP. BP average 124/78 mmHg and has been more stable with reduced use of tizanidine.

## 2025-07-01 RX ORDER — OMEPRAZOLE 40 MG/1
40 CAPSULE, DELAYED RELEASE ORAL DAILY
Qty: 30 CAPSULE | Refills: 11 | Status: SHIPPED | OUTPATIENT
Start: 2025-07-01

## 2025-07-09 DIAGNOSIS — Z00.00 HEALTHCARE MAINTENANCE: ICD-10-CM

## 2025-07-09 RX ORDER — FERROUS SULFATE 325(65) MG
325 TABLET ORAL
Qty: 180 TABLET | Refills: 3 | Status: SHIPPED | OUTPATIENT
Start: 2025-07-09

## 2025-07-09 NOTE — TELEPHONE ENCOUNTER
Ferosul 325 (65 FE) MG tablet    Last Written Prescription Date:  05/17/2024  Last Fill Quantity: 180,   # refills: 3  Last Office Visit: 05/30/2025  Future Office visit:    Next 5 appointments (look out 90 days)      Jul 17, 2025 2:00 PM  (Arrive by 1:45 PM)  Provider Visit with Clarissa Santos MD  [facility] ([facility] ) [address]  [facility] [address]  [phone]             Routing refill request to provider for review/approval because:

## 2025-07-09 NOTE — TELEPHONE ENCOUNTER
Routing refill request to provider for review/approval because:    Iron Supplements [id number]/09/2025 10:08 AM   Protocol Details   Medication indicated for associated diagnosis

## 2025-07-15 DIAGNOSIS — M19.011 ARTHRITIS OF RIGHT SHOULDER REGION: ICD-10-CM

## 2025-07-15 RX ORDER — LIDOCAINE PAIN RELIEF 40 MG/1000MG
PATCH TOPICAL
Qty: 30 PATCH | Refills: 5 | Status: SHIPPED | OUTPATIENT
Start: 2025-07-15

## 2025-07-15 NOTE — PROGRESS NOTES
Assessment & Plan     1. Hyperlipidemia, unspecified hyperlipidemia type (Primary)  No changes, labs updated annually.    2. Anxiety  No changes to current medication - behaviors are at goal, no side effects noted.  Will refill current medication when due.  Follow-up in six months (may sync up with annual exam, then see in 6 months again)    3. Impulse control disorder  As above    4. Disturbance of conduct  As above    5. Acquired hypothyroidism  Labs annually    6. Need for vaccination  Updated  - Pneumococcal 20 Valent Conjugate (PCV20)         The longitudinal plan of care for the diagnosis(es)/condition(s) as documented were addressed during this visit. Due to the added complexity in care, I will continue to support [facility] in the subsequent management and with ongoing continuity of care.     Follow-up  Return in about 4 months (around 11/17/2025) for Physical Exam.    Rebekah Pruett is a 54 year old, presenting for the following health issues:  Anxiety, Lipids, and Thyroid Problem    HPI      Hyperlipidemia Follow-Up    Are you regularly taking any medication or supplement to lower your cholesterol?   Yes- Zocor 10 mg  Are you having muscle aches or other side effects that you think could be caused by your cholesterol lowering medication?  No    Anxiety   How are you doing with your anxiety since your last visit? No change  Are you having other symptoms that might be associated with anxiety? No  Have you had a significant life event? No   Are you feeling depressed? No  Do you have any concerns with your use of alcohol or other drugs? No  Patient presents today with staff for medication review for naltrexone, risperdal, and sertraline.  Staff notes no increase in behaviors since the last review.  Patient is tolerating current medication well without side effects.  Guardian has not requested any changes to current regimen.     Social History     Tobacco Use    Smoking status: Never    Smokeless tobacco:  Never    Tobacco comments:     no passive exposure   Vaping Use    Vaping status: Never Used   Substance Use Topics    Alcohol use: No    Drug use: No         8/7/2023     9:31 AM 5/2/2024    10:08 AM 7/17/2025     1:40 PM   TAYLER-7 SCORE   Total Score 3 (minimal anxiety) 0 (minimal anxiety) 8 (mild anxiety)   Total Score 3 0 8        Patient-reported         3/8/2022     9:00 AM 8/7/2023     9:30 AM 7/17/2025     1:39 PM   PHQ   PHQ-9 Total Score 6 5 7    Q9: Thoughts of better off dead/self-harm past 2 weeks Not at all Not at all  Not at all       Patient-reported    Proxy-reported         7/17/2025     1:39 PM   Last PHQ-9   1.  Little interest or pleasure in doing things 3   2.  Feeling down, depressed, or hopeless 0   3.  Trouble falling or staying asleep, or sleeping too much 0   4.  Feeling tired or having little energy 1   5.  Poor appetite or overeating 1   6.  Feeling bad about yourself 0   7.  Trouble concentrating 2   8.  Moving slowly or restless 0   Q9: Thoughts of better off dead/self-harm past 2 weeks 0   PHQ-9 Total Score 7        Patient-reported         7/17/2025     1:40 PM   TAYLER-7    1. Feeling nervous, anxious, or on edge 2   2. Not being able to stop or control worrying 2   3. Worrying too much about different things 2   4. Trouble relaxing 0   5. Being so restless that it is hard to sit still 0   6. Becoming easily annoyed or irritable 2   7. Feeling afraid, as if something awful might happen 0   TAYLER-7 Total Score 8    If you checked any problems, how difficult have they made it for you to do your work, take care of things at home, or get along with other people? Very difficult       Patient-reported     Hypothyroidism Follow-up    Since last visit, patient describes the following symptoms: Weight stable, no hair loss, no skin changes, no constipation, no loose stools        Review of Systems  Constitutional, HEENT, cardiovascular, pulmonary, gi and gu systems are negative, except as otherwise  noted.      Objective    BP 94/68 (BP Location: Right arm, Patient Position: Sitting, Cuff Size: Adult Regular)   Pulse 83   Temp 98.5  F (36.9  C) (Tympanic)   Resp 16   Ht 1.524 m (5')   Wt 53.1 kg (117 lb)   LMP 06/29/2025 (Exact Date)   SpO2 96%   Breastfeeding No   BMI 22.85 kg/m    Body mass index is 22.85 kg/m .  Physical Exam   GENERAL: alert and no distress  PSYCH: mentation appears normal, affect normal/bright          Signed Electronically by: Clarissa Santos MD

## 2025-07-17 ENCOUNTER — OFFICE VISIT (OUTPATIENT)
Dept: FAMILY MEDICINE | Facility: OTHER | Age: 55
End: 2025-07-17
Attending: FAMILY MEDICINE
Payer: MEDICARE

## 2025-07-17 VITALS
HEIGHT: 60 IN | DIASTOLIC BLOOD PRESSURE: 68 MMHG | OXYGEN SATURATION: 96 % | WEIGHT: 117 LBS | TEMPERATURE: 98.5 F | BODY MASS INDEX: 22.97 KG/M2 | HEART RATE: 83 BPM | RESPIRATION RATE: 16 BRPM | SYSTOLIC BLOOD PRESSURE: 94 MMHG

## 2025-07-17 DIAGNOSIS — Z23 NEED FOR VACCINATION: ICD-10-CM

## 2025-07-17 DIAGNOSIS — F41.9 ANXIETY: ICD-10-CM

## 2025-07-17 DIAGNOSIS — E78.5 HYPERLIPIDEMIA, UNSPECIFIED HYPERLIPIDEMIA TYPE: Primary | ICD-10-CM

## 2025-07-17 DIAGNOSIS — F91.9 DISTURBANCE OF CONDUCT: ICD-10-CM

## 2025-07-17 DIAGNOSIS — F63.9 IMPULSE CONTROL DISORDER: ICD-10-CM

## 2025-07-17 DIAGNOSIS — E03.9 ACQUIRED HYPOTHYROIDISM: ICD-10-CM

## 2025-07-17 PROCEDURE — G0009 ADMIN PNEUMOCOCCAL VACCINE: HCPCS

## 2025-07-17 PROCEDURE — G0463 HOSPITAL OUTPT CLINIC VISIT: HCPCS | Mod: 25

## 2025-07-17 ASSESSMENT — PAIN SCALES - GENERAL: PAINLEVEL_OUTOF10: NO PAIN (0)

## 2025-07-17 ASSESSMENT — ANXIETY QUESTIONNAIRES
3. WORRYING TOO MUCH ABOUT DIFFERENT THINGS: MORE THAN HALF THE DAYS
GAD7 TOTAL SCORE: 8
5. BEING SO RESTLESS THAT IT IS HARD TO SIT STILL: NOT AT ALL
7. FEELING AFRAID AS IF SOMETHING AWFUL MIGHT HAPPEN: NOT AT ALL
7. FEELING AFRAID AS IF SOMETHING AWFUL MIGHT HAPPEN: NOT AT ALL
4. TROUBLE RELAXING: NOT AT ALL
6. BECOMING EASILY ANNOYED OR IRRITABLE: MORE THAN HALF THE DAYS
1. FEELING NERVOUS, ANXIOUS, OR ON EDGE: MORE THAN HALF THE DAYS
8. IF YOU CHECKED OFF ANY PROBLEMS, HOW DIFFICULT HAVE THESE MADE IT FOR YOU TO DO YOUR WORK, TAKE CARE OF THINGS AT HOME, OR GET ALONG WITH OTHER PEOPLE?: VERY DIFFICULT
2. NOT BEING ABLE TO STOP OR CONTROL WORRYING: MORE THAN HALF THE DAYS
GAD7 TOTAL SCORE: 8
IF YOU CHECKED OFF ANY PROBLEMS ON THIS QUESTIONNAIRE, HOW DIFFICULT HAVE THESE PROBLEMS MADE IT FOR YOU TO DO YOUR WORK, TAKE CARE OF THINGS AT HOME, OR GET ALONG WITH OTHER PEOPLE: VERY DIFFICULT
GAD7 TOTAL SCORE: 8

## 2025-07-17 ASSESSMENT — PATIENT HEALTH QUESTIONNAIRE - PHQ9
10. IF YOU CHECKED OFF ANY PROBLEMS, HOW DIFFICULT HAVE THESE PROBLEMS MADE IT FOR YOU TO DO YOUR WORK, TAKE CARE OF THINGS AT HOME, OR GET ALONG WITH OTHER PEOPLE: VERY DIFFICULT
SUM OF ALL RESPONSES TO PHQ QUESTIONS 1-9: 7
SUM OF ALL RESPONSES TO PHQ QUESTIONS 1-9: 7

## 2025-07-21 DIAGNOSIS — Z00.00 HEALTH CARE MAINTENANCE: ICD-10-CM

## 2025-07-21 RX ORDER — CALCIUM CARBONATE-CHOLECALCIFEROL TAB 250 MG-125 UNIT 250-125 MG-UNIT
TAB ORAL
Qty: 180 TABLET | Refills: 3 | Status: SHIPPED | OUTPATIENT
Start: 2025-07-21

## 2025-07-21 NOTE — TELEPHONE ENCOUNTER
calcium 250 mg (as carbonate)-vitamin D3 3 mcg (120 unit) tablet         Last Written Prescription Date:  8/21/24  Last Fill Quantity: 180,   # refills: 2  Last Office Visit: 7/17/25  Future Office visit:       Routing refill request to provider for review/approval because:      Vitamin Supplements Protocol [id number]/21/2025 12:26 PM   Protocol Details Medication is active on med list and the sig matches. RN to manually verify dose and sig if red X/fail.

## 2025-07-24 DIAGNOSIS — E03.9 HYPOTHYROIDISM, UNSPECIFIED TYPE: ICD-10-CM

## 2025-07-24 RX ORDER — LEVOTHYROXINE SODIUM 137 UG/1
137 TABLET ORAL DAILY
Qty: 30 TABLET | Refills: 11 | Status: SHIPPED | OUTPATIENT
Start: 2025-07-24

## 2025-07-30 DIAGNOSIS — L30.9 ECZEMA, UNSPECIFIED TYPE: ICD-10-CM

## 2025-07-30 RX ORDER — TRIAMCINOLONE ACETONIDE 1 MG/G
CREAM TOPICAL 2 TIMES DAILY PRN
Qty: 80 G | Refills: 1 | Status: SHIPPED | OUTPATIENT
Start: 2025-07-30

## 2025-08-04 ENCOUNTER — HOSPITAL ENCOUNTER (EMERGENCY)
Facility: HOSPITAL | Age: 55
Discharge: HOME | End: 2025-08-04
Attending: PHYSICIAN ASSISTANT
Payer: MEDICARE

## 2025-08-04 ENCOUNTER — PATIENT OUTREACH (OUTPATIENT)
Dept: CARE COORDINATION | Facility: CLINIC | Age: 55
End: 2025-08-04

## 2025-08-04 VITALS
OXYGEN SATURATION: 97 % | TEMPERATURE: 97.7 F | HEART RATE: 93 BPM | SYSTOLIC BLOOD PRESSURE: 96 MMHG | DIASTOLIC BLOOD PRESSURE: 59 MMHG | RESPIRATION RATE: 18 BRPM

## 2025-08-04 DIAGNOSIS — R21 RASH: Primary | ICD-10-CM

## 2025-08-04 PROCEDURE — G0463 HOSPITAL OUTPT CLINIC VISIT: HCPCS | Performed by: PHYSICIAN ASSISTANT

## 2025-08-04 PROCEDURE — 99213 OFFICE O/P EST LOW 20 MIN: CPT | Performed by: PHYSICIAN ASSISTANT

## 2025-08-04 RX ORDER — MUPIROCIN 2 %
OINTMENT (GRAM) TOPICAL 2 TIMES DAILY
Qty: 15 G | Refills: 0 | Status: SHIPPED | OUTPATIENT
Start: 2025-08-04

## 2025-08-04 RX ORDER — ZINC OXIDE
OINTMENT (GRAM) TOPICAL PRN
Qty: 113 G | Refills: 1 | Status: SHIPPED | OUTPATIENT
Start: 2025-08-04

## 2025-08-04 ASSESSMENT — COLUMBIA-SUICIDE SEVERITY RATING SCALE - C-SSRS: IS THE PATIENT NOT ABLE TO COMPLETE C-SSRS: UNABLE TO VERBALIZE

## 2025-08-04 ASSESSMENT — ACTIVITIES OF DAILY LIVING (ADL): ADLS_ACUITY_SCORE: 41

## 2025-08-18 DIAGNOSIS — F41.9 ANXIETY: ICD-10-CM

## 2025-08-18 DIAGNOSIS — M19.011 ARTHRITIS OF RIGHT SHOULDER REGION: ICD-10-CM

## 2025-08-19 RX ORDER — ACETAMINOPHEN, DIPHENHYDRAMINE HYDROCHLORIDE 500; 25 MG/1; MG/1
2 TABLET, FILM COATED ORAL AT BEDTIME
Qty: 60 TABLET | Refills: 5 | Status: SHIPPED | OUTPATIENT
Start: 2025-08-19

## 2025-09-02 DIAGNOSIS — R21 RASH: Primary | ICD-10-CM

## 2025-09-02 DIAGNOSIS — F63.9 IMPULSE CONTROL DISEASE: ICD-10-CM

## 2025-09-03 RX ORDER — GUANFACINE 1 MG/1
1 TABLET ORAL 2 TIMES DAILY
Qty: 60 TABLET | Refills: 5 | Status: SHIPPED | OUTPATIENT
Start: 2025-09-03 | End: 2025-09-04

## 2025-09-03 RX ORDER — MUPIROCIN 2 %
OINTMENT (GRAM) TOPICAL 2 TIMES DAILY
Qty: 15 G | Refills: 0 | Status: SHIPPED | OUTPATIENT
Start: 2025-09-03 | End: 2025-09-04

## 2025-09-04 RX ORDER — MUPIROCIN 2 %
OINTMENT (GRAM) TOPICAL 2 TIMES DAILY
Qty: 15 G | Refills: 0 | Status: SHIPPED | OUTPATIENT
Start: 2025-09-04

## 2025-09-04 RX ORDER — GUANFACINE 1 MG/1
1 TABLET ORAL 2 TIMES DAILY
Qty: 60 TABLET | Refills: 5 | Status: SHIPPED | OUTPATIENT
Start: 2025-09-04